# Patient Record
Sex: MALE | Race: WHITE | NOT HISPANIC OR LATINO | Employment: FULL TIME | URBAN - METROPOLITAN AREA
[De-identification: names, ages, dates, MRNs, and addresses within clinical notes are randomized per-mention and may not be internally consistent; named-entity substitution may affect disease eponyms.]

---

## 2017-01-04 ENCOUNTER — TELEPHONE (OUTPATIENT)
Dept: PALLIATIVE MEDICINE | Facility: CLINIC | Age: 42
End: 2017-01-04

## 2017-01-04 ENCOUNTER — OFFICE VISIT (OUTPATIENT)
Dept: PALLIATIVE MEDICINE | Facility: CLINIC | Age: 42
End: 2017-01-04
Payer: COMMERCIAL

## 2017-01-04 VITALS
BODY MASS INDEX: 28.74 KG/M2 | HEART RATE: 74 BPM | SYSTOLIC BLOOD PRESSURE: 122 MMHG | DIASTOLIC BLOOD PRESSURE: 76 MMHG | WEIGHT: 206 LBS

## 2017-01-04 DIAGNOSIS — G89.4 CHRONIC PAIN SYNDROME: ICD-10-CM

## 2017-01-04 DIAGNOSIS — M47.812 CERVICAL SPONDYLOSIS WITHOUT MYELOPATHY: Primary | ICD-10-CM

## 2017-01-04 DIAGNOSIS — M96.1 FAILED BACK SYNDROME OF LUMBAR SPINE: ICD-10-CM

## 2017-01-04 PROCEDURE — 99214 OFFICE O/P EST MOD 30 MIN: CPT | Performed by: PHYSICAL MEDICINE & REHABILITATION

## 2017-01-04 RX ORDER — BUPRENORPHINE 15 UG/H
15 PATCH TRANSDERMAL WEEKLY
Qty: 18 PATCH | Refills: 0 | Status: SHIPPED | OUTPATIENT
Start: 2017-01-04 | End: 2017-02-15

## 2017-01-04 ASSESSMENT — PAIN SCALES - GENERAL: PAINLEVEL: MODERATE PAIN (5)

## 2017-01-04 NOTE — NURSING NOTE
"Chief Complaint   Patient presents with     Pain       Initial /76 mmHg  Pulse 74  Wt 93.441 kg (206 lb) Estimated body mass index is 28.74 kg/(m^2) as calculated from the following:    Height as of 11/28/16: 1.803 m (5' 11\").    Weight as of this encounter: 93.441 kg (206 lb).  BP completed using cuff size: regular.  Joana Gutierrez [facility]      "

## 2017-01-04 NOTE — MR AVS SNAPSHOT
After Visit Summary   1/4/2017    Diego Meng    MRN: [id number]           Patient Information     Date Of Birth          1975        Visit Information        Provider Department      1/4/2017 8:30 AM Prudence Almonte DO [facility]        Today's Diagnoses     Cervical spondylosis without myelopathy    -  1     Chronic pain syndrome         Failed back syndrome of lumbar spine           Care Instructions    1. Clinical Health Psychologist to address issues of relaxation, behavioral change, coping style, and other factors important to improvement: Schedule an evaluation with Dr. Benitez.    2. Medication Management: UDS with expected and appropriate results and opioid agreement on 11/2/16  -Continue butrans patch 15 mcg changed every 5 days instead of every 7 days.  3. Follow up: with Dr. Almonte in 3 months.    Nurse Triage line:  [phone]   Call this number with any questions or concerns. You may leave a detailed message anytime. Calls are typically returned Monday through Friday between 8 AM and 4:30 PM. We usually get back to you within 2 business days depending on the issue/request.       Medication refills:    For non-narcotic medications, call your pharmacy directly to request a refill. The pharmacy will contact the [facility] Center for authorization. Please allow 3-4 days for these refills to be processed.     For narcotic refills, call the nurse triage line or send a PowerV[employer] message. Please contact us 7-10 days before your refill is due. The message MUST include the name of the specific medication(s) requested and how you would like to receive the prescription(s). The options are as follows:    Pain Clinic staff can mail the prescription to your pharmacy. Please tell us the name of the pharmacy.    You may pick the prescription up at the Pain Clinic (tell us the location) or during a clinic visit with your pain provider    Pain Clinic staff can deliver the  prescription to the [facility] pharmacy in the clinic building. Please tell us the location.      Scheduling number: [phone].  Call this number to schedule or change appointments.    We believe regular attendance is key to your success in our program.    Any time you are unable to keep your appointment we ask that you call us at least 24 hours in advance to let us know. This will allow us to offer the appointment time to another patient.             Follow-ups after your visit        Additional Services     PAIN INJECTION EVAL/TREAT/FOLLOW UP                 Who to contact     If you have questions or need follow up information about today's clinic visit or your schedule please contact [facility] directly at [phone].  Normal or non-critical lab and imaging results will be communicated to you by MyChart, letter or phone within 4 business days after the clinic has received the results. If you do not hear from us within 7 days, please contact the clinic through [employer] or phone. If you have a critical or abnormal lab result, we will notify you by phone as soon as possible.  Submit refill requests through Circ[employer] or call your pharmacy and they will forward the refill request to us. Please allow 3 business days for your refill to be completed.          Additional Information About Your Visit        MyChart Information     Circular gives you secure access to your electronic health record. If you see a primary care provider, you can also send messages to your care team and make appointments. If you have questions, please call your primary care clinic.  If you do not have a primary care provider, please call [phone] and they will assist you.        Care EveryWhere ID     This is your Care EveryWhere ID. This could be used by other organizations to access your [facility] medical records  [id number]        Your Vitals Were     Pulse                   74            Blood Pressure from Last 3  Encounters:   01/04/17 122/76   12/05/16 118/76   11/28/16 126/81    Weight from Last 3 Encounters:   01/04/17 93.441 kg (206 lb)   12/05/16 93.441 kg (206 lb)   11/28/16 93.305 kg (205 lb 11.2 oz)              We Performed the Following     PAIN INJECTION EVAL/TREAT/FOLLOW UP          Today's Medication Changes          These changes are accurate as of: 1/4/17  9:09 AM.  If you have any questions, ask your nurse or doctor.               These medicines have changed or have updated prescriptions.        Dose/Directions    * Buprenorphine 15 MCG/HR Ptwk   This may have changed:  Another medication with the same name was added. Make sure you understand how and when to take each.   Used for:  Chronic pain syndrome   Changed by:  Prudence Almonte DO        Dose:  15 mcg   Place 15 mcg onto the skin every 7 days OK to dispense 1/2/17 to start 1/3/17   Quantity:  4 patch   Refills:  0       * Buprenorphine 15 MCG/HR Ptwk   Commonly known as:  BUTRANS   This may have changed:  You were already taking a medication with the same name, and this prescription was added. Make sure you understand how and when to take each.   Used for:  Cervical spondylosis without myelopathy, Chronic pain syndrome, Failed back syndrome of lumbar spine   Changed by:  Prudence Almonte DO        Dose:  15 mcg/hr   Place 15 mcg/hr onto the skin once a week Change patch every 5 days. 90 day supply.   Quantity:  18 patch   Refills:  0       * Notice:  This list has 2 medication(s) that are the same as other medications prescribed for you. Read the directions carefully, and ask your doctor or other care provider to review them with you.         Where to get your medicines      Some of these will need a paper prescription and others can be bought over the counter.  Ask your nurse if you have questions.     Bring a paper prescription for each of these medications    - Buprenorphine 15 MCG/HR Ptwk             Primary Care Provider Office Phone # Fax #     Priscilla Murphy [phone]       [address] [address]        Thank you!     Thank you for choosing [facility]  for your care. Our goal is always to provide you with excellent care. Hearing back from our patients is one way we can continue to improve our services. Please take a few minutes to complete the written survey that you may receive in the mail after your visit with us. Thank you!             Your Updated Medication List - Protect others around you: Learn how to safely use, store and throw away your medicines at www.disposemymeds.org.          This list is accurate as of: 1/4/17  9:09 AM.  Always use your most recent med list.                   Brand Name Dispense Instructions for use    atorvastatin 40 MG tablet    LIPITOR    90 tablet    Take 1 tablet (40 mg) by mouth daily       * Buprenorphine 15 MCG/HR Ptwk     4 patch    Place 15 mcg onto the skin every 7 days OK to dispense 1/2/17 to start 1/3/17       * Buprenorphine 15 MCG/HR Ptwk    BUTRANS    18 patch    Place 15 mcg/hr onto the skin once a week Change patch every 5 days. 90 day supply.       buprenorphine HCl-naloxone HCl 8-2 MG per film    SUBOXONE    90 Film    Place 1 Film under the tongue 3 times daily       clonazePAM 1 MG tablet    klonoPIN    120 tablet    Take 1 tablet (1 mg) by mouth 4 times daily       ESCITALOPRAM OXALATE PO      Take 20 mg by mouth daily       esomeprazole 20 MG CR capsule    nexIUM    30 capsule    Take 1 capsule (20 mg) by mouth every morning (before breakfast) Take 30-60 minutes before eating.       methocarbamol 500 MG tablet    ROBAXIN    240 tablet    Take 1 tablet (500 mg) by mouth 2 times daily       omega 3 1000 MG Caps     90 capsule    Take 1 g by mouth daily       * Notice:  This list has 2 medication(s) that are the same as other medications prescribed for you. Read the directions carefully, and ask your doctor or other care  provider to review them with you.

## 2017-01-04 NOTE — PROGRESS NOTES
[facility]    Date of visit: 1/4/2016    Chief complaint:   Chief Complaint   Patient presents with     Pain       Interval history:  Diego Meng is a 41 year old male last seen by me on 12/5/16.    Since his last visit, Diego Meng reports:  -His left knee has significantly improved. He continues to have right knee pain, but no plans for surgery until his left knee is completely healed. He denies any specific injury to this knee.  -Butrans is working well. He feels that the butrans patch is providing some relief of his back pain. He notices on day 5 that the pain relief starts wearing off. He reports that it is expensive. Talked to the pharmacy about coupons.   -His headaches are unchanged despite the increase in Butrans. They start at the back of his head/neck and radiate through his head to his eyes. We reviewed his cervical MRI and he is interested in proceeding with medial branch blocks. He has had medial branch blocks for his low back pain in the past, which did not provide significant relief.     Pain scores:  Pain intensity on average is 5 (down from 6) on a scale of 0-10.     Current pain treatments:   butrans 15 mcg patch  excedrin #2-6/day for 20 years  lexapro 20 mg daily  Clonazepam 1 mg #1-2/day    Side Effects: no side effect    THE 4 A's OF OPIOID MAINTENANCE ANALGESIA    Analgesia: good, decreases pain by 2 points    Activity: improving     Adverse effects: none    Adherence to Rx protocol: good      Past pain treatments:  Diego Meng has been seen at a pain clinic in the past. [facility] and [facility]  Medications:                botox -no relief              Anti-migraine: fioricet, migranal, eletriptan, frovatriptan, midrin, naraptriptan, sumatriptan, zolmitripatn -no relief from any of these              Anti-convulsants: depakote, topiramate, gabapentin, lyrica -all of these made him feel fuzzy and  forgetful and they were not helpful              Opioids: oxycontin, morphine -no relief, hydrocodone -no relief, tramadol -no relief                Muscle relaxants: soma -no relief, flexeril -no relief and made him tired, norflex -no relief, tizanidine -no relief and made him tired              NSAIDs: allergic to this class of medications -rash, abdominal pain and cramping              Anti-depressants: cymbalta -no relief                Topicals: lidocaine -no relief  PT: [facility] in 2016 -helped for shoulder but flared up back and neck  Psychology: yes for pain at the [facility] -'continues to use these skills'  Acupuncture: tried it -no relief  Chiropractic care: tried it -helped initially but then flared up pain  TENS Unit: no relief  Injections: lumbar epidural steroid injection -flared pain, cervical epidural steroid injection -provided 2-4 weeks of relief but had significant flare up for 2 weeks post-procedure, cervical trigger point injections -minimal relief, lumbar medial branch block -no relief  Surgery: right L4-L5 hemilaminectomy May 2015 Dr. Rios    Medications:  Current Outpatient Prescriptions   Medication Sig Dispense Refill     Buprenorphine 15 MCG/HR PTWK Place 15 mcg onto the skin every 7 days OK to dispense 1/2/17 to start 1/3/17 4 patch 0     clonazePAM (KLONOPIN) 1 MG tablet Take 1 tablet (1 mg) by mouth 4 times daily 120 tablet 0     atorvastatin (LIPITOR) 40 MG tablet Take 1 tablet (40 mg) by mouth daily 90 tablet 1     esomeprazole (NEXIUM) 20 MG capsule Take 1 capsule (20 mg) by mouth every morning (before breakfast) Take 30-60 minutes before eating. 30 capsule 0     omega 3 1000 MG CAPS Take 1 g by mouth daily 90 capsule      ESCITALOPRAM OXALATE PO Take 20 mg by mouth daily       buprenorphine HCl-naloxone HCl (SUBOXONE) 8-2 MG film Place 1 Film under the tongue 3 times daily 90 Film 0     methocarbamol (ROBAXIN) 500 MG tablet Take 1 tablet  (500 mg) by mouth 2 times daily 240 tablet        Medical History: any changes in medical history since they were last seen? no    Review of Systems:  The 14 system ROS was reviewed from the intake questionnaire, and is positive for: headache  Any bowel or bladder problems: none  Mood: anxiety    Physical Exam:  Blood pressure 122/76, pulse 74, weight 93.441 kg (206 lb).  General: no acute distress, pleasant, sitting comfortably  Gait: normal  MSK exam: baseline    Imaging:  Lumbar MRI completed on 8/1/15 showed:  CONCLUSION:    1. Interval right hemilaminotomy and partial facetectomy at L4-L5. No recurrent disc herniation.  2. Other minor degenerative changes. Annular fissures L4-L5 and L5-S1. No high-grade central canal or foraminal stenosis at any level.    Cervical spine MRI completed on 11/14/14:  FINDINGS: Cervical spine visualized through the upper thoracic spine. Lordosis normal. Slight retrolisthesis at each level between C3 and C6. Vertebral body heights are normal. Marrow signal is normal, including the facets. Paraspinous soft tissues are unremarkable. Visible posterior fossa contents are unremarkable. Cord signal and morphology is normal.  CRANIOCERVICAL JUNCTION: Mild anterior spurring at C1-C2. Foramen magnum and central canal are adequate.  C2-C3: Disc height normal. Mild facet hypertrophy. No central canal or foraminal stenosis.  C3-C4: Disc height normal. Slight right uncinate hypertrophy. Minimal facet hypertrophy. No significant central canal or foraminal stenosis.    C4-C5: Slight disc height loss. Mild bilateral facet hypertrophy. No central canal or foraminal stenosis.  C5-C6: Mild disc height loss with left eccentric disc osteophyte complex. Mild bilateral facet hypertrophy. No central canal or right foraminal stenosis. Moderate left foraminal stenosis.    C6-C7: Slight disc height loss. Mild facet hypertrophy. No central canal or foraminal stenosis.  C7-T1: Mild disc height loss. Mild to  moderate bilateral facet hypertrophy. No central canal or foraminal stenosis.  CONCLUSION:  1. Moderate left foraminal stenosis at C5-C6.  2. Other relatively minor degenerative changes, no further high-grade central canal or foraminal stenosis.     Right shoulder x-ray completed on 4/2/15:  FINDINGS: No evidence of fracture or dislocation. Normal glenohumeral joint alignment without evidence of degenerative changes. No evidence of significant acromioclavicular joint degenerative joint disease. No acromial or coracoid enthesophyte. Mild undersurface acromial sclerosis. Downslope type three acromion. No blastic or lytic lesions.  IMPRESSION:  Right shoulder type three acromion otherwise within normal limits.     [facility] Reviewed: YES; as expected, no concern for abuse or misuse    Assessment:   1. Chronic pain syndrome  2. Opioid dependency  3. Chronic low back pain. Right L4-L5 hemilaminectomy partial facetectomy in May 2015 with Dr. Rios  4. Chronic neck pain with associated chronic headaches. Cervical facet arthropathy.  5. Anxiety with panic attacks. Takes lexapro and clonazepam.  6. Tobacco use disorder  7. Left knee meniscus repair on 11/28/16 with Dr. Kaufman. Chronic right knee pain as well.     Plan:  1. Clinical Health Psychologist to address issues of relaxation, behavioral change, coping style, and other factors important to improvement: Schedule an evaluation with Dr. Benitze.    2. Medication Management: UDS with expected and appropriate results and opioid agreement on 11/2/16  -Continue butrans patch 15 mcg changed every 5 days instead of every 7 days. 90 day supply.   3. Follow up: with Dr. Almonte in 3 months.  4. Cervical medial branch blocks     Total time spent was 30 minutes, and more than 50% of face to face time was spent in counseling and/or coordination of care regarding the above assessment and plan.    Prudence Almonte, [facility]  [facility]

## 2017-01-04 NOTE — TELEPHONE ENCOUNTER
Pre-screening questions for Radiology Injections:    Injection to be done at which interventional clinic site? [facility]    Procedure ordered by Dr. CORONA    Procedure ordered? Cervical Medial Branch Block    What insurance would patient like us to bill for this procedure? BCBS      Worker's comp- Any injection DO NOT SCHEDULE and route to Mitzi Randolph.      [employer] insurance - If scheduling an SI joint injection DO NOT SCHEDULE and route to Mitiz Randolph.     [employer]- MBB's must be scheduled at LEAST two weeks apart      [employer] - Any injection besides hip/shoulder/knee joint DO NOT SCHEDULE and route to Mitzi Randolph. She will obtain PA and call pt back to schedule procedure or notify pt of denial.     Is an  needed? No     Patient has a drive home? (mandatory) Yes     Is patient taking any blood thinners (plavix, coumadin, jantoven, warfarin, heparin, pradaxa or dabigatran )? No   (If so, do not schedule, contact RN and/or MD)     Is patient taking any aspirin products? No   (If more than 325mg/day do not schedule; Contact RN/MD. For all non-cervical interventional procedures if patient is taking MORE than 325mg/day, limit aspirin to 81-325mg/day x 1 week. No hold required day of procedure.  For CERVICAL procedures, hold all aspirin products for 6 days.)      Does the patient have a bleeding or clotting disorder? No   (If yes, okay to schedule, but contact RN/MD).  **For any patients with platelet count <100, must be forwarded to provider**    Is patient diabetic? no If YES, have them bring their glucometer.    Does patient have an active infection or treated for one within the past week? No    Is patient currently taking any antibiotics?  No  For patients on chronic, preventative, or prophylactic antibiotics, procedures can be scheduled.   For patients on antibiotics for active or recent infection:  Suzy Jaimes, Nancy-antibiotic course must have been completed for 4  days  Saad Cramer-antibiotic course must have been completed for 7 days    Is patient currently taking any steroid medications? (i.e. Prednisone, Medrol)  No   For patients on steroid medications:  Suzy Jaimes [place]-steroid course must have been completed for 4 days  Saad Cramer-steroid course must have been completed for 7 days    Review with patient:  If you are started on any steroids or antibiotics between now and your appointment, you must contact us because it may affect our ability to perform your procedure informed    Is patient actively being treated for cancer or immunocompromised, including the spleen having been removed? No  **For Dr. Clancy patients without spleens should have the chart sent to her**  (If YES, do NOT schedule and route to RN)    Are you able to get on and off an exam table with minimal or no assistance? Yes  (If NO, do NOT schedule and route to RN)  Are you able to roll over and lay on your stomach with minimal or no assistance? Yes  (If NO, do NOT schedule and route to RN)         Any allergies to contrast dye, iodine, shellfish, or numbing and steroid medications? No  (If so, inform nursing and note in scheduling comments.)    Allergies: Nsaids      Any chance of pregnancy? No    Has the patient had a flu shot or any other vaccinations within 7 days before or after the procedure.    No       Does patient have an MRI/CT? YES  (SI joint, hip injections, lumbar sympathetic blocks, and stellate ganglion blocks do not require an MRI)    If so, was it done at [facility]? Yes      If not, where was it done?      Was the MRI done w/in the last 3 years? Yes     If MRI was not done at [facility], [facility] or [facility] do NOT schedule. Route to nursing.  (If pt has disc the injection can be scheduled but pt has to bring disc to appt. If they show up w/out disc the injection cannot be done)      **Must be scheduled with elapsed time interval of at least 2 weeks and not  more than 6 months between the First MBB and the Second MBB**       Medial Branch Block Pre-Procedure Instructions    It is okay to take long acting pain medications (if you are on them) the day of the procedure but try not to take any short acting medications unless absolutely necessary. informed        Long acting meds would include: Gabapentin (Neurontin), MS Contin, Oxycontin        Short acting meds would include:  Percocet, Oxycodone, Vicodin, Ibuprofen     The day of the procedure, you should try to do things that provoke your pain, since the injection is being done to see if it will relieve your pain . INFORMED    If your pain level is a 4 out of 10 or less on the day of the procedure, please call [phone] to reschedule.  INFORMED      The latest appt available for cervical MBB is 2:30pm. Do Not schedule any later.        Reminders (please tell patient if applicable):        Instructed pt to arrive 30 minutes early for IV start if this is for a cervical procedure, ALL sympathetic (stellate ganglion, hypogastric, or lumbar sympathetic block) and all sedation procedures (RFA, spinal cord stimulation trials).         -IVs are not routinely placed for Almonte and [place] cervical cases       If NPO for sedation, it is okay to take medications with sips of water (except if they are to hold blood thinners).    *DO take blood pressure medication if it is prescribed*      If this is for a cervical MBB aspirin needs to be held for 6 days.        Do not schedule procedures requiring IV placement in the first appointment after lunch         For patients 85 or older we recommend having an adult stay w/ them for the remainder of the day.              Does the patient have any questions?

## 2017-01-04 NOTE — PATIENT INSTRUCTIONS
1. Clinical Health Psychologist to address issues of relaxation, behavioral change, coping style, and other factors important to improvement: Schedule an evaluation with Dr. Benitez.    2. Medication Management: UDS with expected and appropriate results and opioid agreement on 11/2/16  -Continue butrans patch 15 mcg changed every 5 days instead of every 7 days.  3. Follow up: with Dr. Almonte in 3 months.    Nurse Triage line:  [phone]   Call this number with any questions or concerns. You may leave a detailed message anytime. Calls are typically returned Monday through Friday between 8 AM and 4:30 PM. We usually get back to you within 2 business days depending on the issue/request.       Medication refills:    For non-narcotic medications, call your pharmacy directly to request a refill. The pharmacy will contact the [facility] Center for authorization. Please allow 3-4 days for these refills to be processed.     For narcotic refills, call the nurse triage line or send a [employer] message. Please contact us 7-10 days before your refill is due. The message MUST include the name of the specific medication(s) requested and how you would like to receive the prescription(s). The options are as follows:    Pain Clinic staff can mail the prescription to your pharmacy. Please tell us the name of the pharmacy.    You may pick the prescription up at the Pain Clinic (tell us the location) or during a clinic visit with your pain provider    Pain Clinic staff can deliver the prescription to the [place] pharmacy in the clinic building. Please tell us the location.      Scheduling number: [phone].  Call this number to schedule or change appointments.    We believe regular attendance is key to your success in our program.    Any time you are unable to keep your appointment we ask that you call us at least 24 hours in advance to let us know. This will allow us to offer the appointment time to another patient.

## 2017-01-18 ENCOUNTER — TELEPHONE (OUTPATIENT)
Dept: PALLIATIVE MEDICINE | Facility: CLINIC | Age: 42
End: 2017-01-18

## 2017-01-18 DIAGNOSIS — G89.4 CHRONIC PAIN SYNDROME: Primary | ICD-10-CM

## 2017-01-18 NOTE — TELEPHONE ENCOUNTER
I submitted a prior authorization for patient via Cover My Med's. I am waiting to hear back as to wether it has been approved or denied.      Allegra Wyatt [facility] [place]

## 2017-01-19 NOTE — TELEPHONE ENCOUNTER
Patient is calling to check on status of authorization, informed him of information below. Patient would like an appeal submitted if possible.    Anne Marie Mims    [facility]

## 2017-01-19 NOTE — TELEPHONE ENCOUNTER
I received a fax from patients insurance stating that they will only cover 4 patches a month. They will not approve anymore then 4 for 30 days. I will forward this to the provider as a FYI.      Allegra Wyatt [facility] [place]

## 2017-01-20 ENCOUNTER — TELEPHONE (OUTPATIENT)
Dept: PALLIATIVE MEDICINE | Facility: CLINIC | Age: 42
End: 2017-01-20

## 2017-01-20 NOTE — TELEPHONE ENCOUNTER
Patient states he will not be able to make appointment. Patient will call back to reschedule. Cancelling his appointment.  Joana Gutierrez, [facility]

## 2017-01-23 RX ORDER — BUPRENORPHINE 15 UG/H
15 PATCH TRANSDERMAL
Qty: 4 PATCH | Refills: 0 | Status: SHIPPED | OUTPATIENT
Start: 2017-01-23 | End: 2017-01-24 | Stop reason: DRUGHIGH

## 2017-01-23 RX ORDER — BUPRENORPHINE 15 UG/H
15 PATCH TRANSDERMAL
Qty: 4 PATCH | Refills: 0 | Status: SHIPPED | OUTPATIENT
Start: 2017-01-23 | End: 2017-01-23

## 2017-01-23 NOTE — TELEPHONE ENCOUNTER
Routing to MA pool for appeal    Trena Washburn  BSN-RN Care Coordinator  [facility]

## 2017-01-23 NOTE — TELEPHONE ENCOUNTER
Routing to provider to review. Insurance will only cover 4/month. Patient on last day of meds. Routing for recommendation. MA will follow up on PA as well    Trena MARCN-RN Care Coordinator  [facility]

## 2017-01-23 NOTE — TELEPHONE ENCOUNTER
Rx faxed to [facility]. Fax confirmation received. Hard copy destroyed.   Joana Gutierrez, CARLOS   [facility] [place]

## 2017-01-23 NOTE — TELEPHONE ENCOUNTER
Patient states today is the last day of his medication, wants to know what to do. Call back number: [phone].    Anne Marie Mims    [facility]

## 2017-01-23 NOTE — TELEPHONE ENCOUNTER
Called patient to inform of new script for 4 patches to be faxed while we attempt appeal for Butrans increase. Advised that we will be in contact with him when we have an answer on the appeal.     Routing to MA pool.  Please fax script to [facility] on [address]. Do not close encounter. Please follow up with PA/justice MARCN-RN Care Coordinator  [facility]

## 2017-01-23 NOTE — TELEPHONE ENCOUNTER
I refilled the Butrans and placed the script in the out basket.     Prudence Almonte,   [facility] [place]

## 2017-01-24 NOTE — TELEPHONE ENCOUNTER
Patient was contacted by Joana GAFFNEY to advise of PA approval, he did not  new script for 4 patches that were faxed yesterday.  PA approved for 6 patches. Pharmacy contacted to verify script for 6/month still on file, they were able to run script through with no issue. They stated they would call patient when script ready for . Advised to d/c script for #4 done on 01/23/17.     Trena Washburn  BSN-RN Care Coordinator  [place]

## 2017-01-24 NOTE — TELEPHONE ENCOUNTER
Received PA approval for Butrans (UP TO 6 PATCHES PER MONTH) from [employer] insurance.     Authorization Number: [id number]  Effective dates:  1/18/2017 to 7/18/2017    Faxed approval to [facility].     Joana Gutierrez, [facility]

## 2017-01-24 NOTE — TELEPHONE ENCOUNTER
I called patients insurance to find out what I needed for the appeal. I then faxed the information that they requested. I am waiting for a reply as to the status of the appeal.      Allegra Wyatt [facility] [place]

## 2017-01-24 NOTE — TELEPHONE ENCOUNTER
I called and talked to the patient and let him know that the prior auth was approved. I also asked him if he had filled the prescription for the 4 patches he stated that he had not so I let the nurse know. See documentation below. I also let the patient know that the pharmacy would be getting more patches in today and that they would call him when they were ready.      Allegra Wyatt [facility] [place]

## 2017-02-07 DIAGNOSIS — F41.1 GAD (GENERALIZED ANXIETY DISORDER): ICD-10-CM

## 2017-02-07 DIAGNOSIS — F41.0 PANIC ATTACK: Primary | ICD-10-CM

## 2017-02-07 NOTE — TELEPHONE ENCOUNTER
clonazePAM (KLONOPIN) 1 MG tablet  Last Written Prescription Date:  12-  Last Fill Quantity: 120,   # refills: 0  Last Office Visit with [facility], [facility] or [facility] prescribing provider: 01-  Future Office visit:   n/a    Routing refill request to provider for review/approval because:  Drug not on the [facility], [facility] or [facility] refill protocol or controlled substance    Please call patient once rx is done and he will come and pick it up at the desk

## 2017-02-08 RX ORDER — CLONAZEPAM 1 MG/1
1 TABLET ORAL 4 TIMES DAILY
Qty: 120 TABLET | Refills: 0 | Status: SHIPPED | OUTPATIENT
Start: 2017-02-08 | End: 2017-03-14

## 2017-02-08 NOTE — TELEPHONE ENCOUNTER
Printed, signed, in my outbox.    Priscilla Murphy MD  Internal Medicine/Pediatrics  [facility]

## 2017-02-15 DIAGNOSIS — G89.4 CHRONIC PAIN SYNDROME: ICD-10-CM

## 2017-02-15 DIAGNOSIS — M47.812 CERVICAL SPONDYLOSIS WITHOUT MYELOPATHY: ICD-10-CM

## 2017-02-15 DIAGNOSIS — M96.1 FAILED BACK SYNDROME OF LUMBAR SPINE: ICD-10-CM

## 2017-02-15 NOTE — TELEPHONE ENCOUNTER
Received call from patient requesting refill(s) of Butrans 15mcg. Was only able to  6 of them at last fill, one month supply per prior auth.     Last picked up from pharmacy on 1/24/17  Due date 2/23/17    Pt last seen on 1/4/17  Next appt scheduled NONE    Last urine drug screen 11/2/16  Current opioid agreement on file Yes Date: 11/2/16    Processing (pick one):  Fax to [facility]    Will facilitate refill.

## 2017-02-15 NOTE — TELEPHONE ENCOUNTER
Patient called at 12:56. He would like a refill of his Butrans patch 15 mcg.  Tuesday next week is when he will be out.  We can call him at [phone]    Ratna BolanosR)

## 2017-02-17 RX ORDER — BUPRENORPHINE 15 UG/H
15 PATCH TRANSDERMAL WEEKLY
Qty: 6 PATCH | Refills: 0 | Status: SHIPPED | OUTPATIENT
Start: 2017-02-23 | End: 2017-03-15

## 2017-02-17 NOTE — TELEPHONE ENCOUNTER
Rx faxed to [facility]. Fax confirmation received. Hardcopy destroyed. Left patient message.   Joana Gutierrez [facility]

## 2017-02-20 ENCOUNTER — TELEPHONE (OUTPATIENT)
Dept: PALLIATIVE MEDICINE | Facility: CLINIC | Age: 42
End: 2017-02-20

## 2017-02-20 NOTE — TELEPHONE ENCOUNTER
"Call came in on 2/20/17 at 1 :59 pm.    \"Had switched insurances on 2/1/17 and need to have a PA done on the Butrans. My Group #  [id number] . My ID # [id number].[employer] needs a PA.  \" Will route to MA pool to initiate.  Viri lai rn    "

## 2017-02-21 ENCOUNTER — TELEPHONE (OUTPATIENT)
Dept: PALLIATIVE MEDICINE | Facility: CLINIC | Age: 42
End: 2017-02-21

## 2017-02-21 NOTE — TELEPHONE ENCOUNTER
Nurse Line Message 2/21/2017 Routing to RN pool    Patients Butrans is being held up by a PA. He will be out of medication tomorrow so needs a plan. He is requesting a call back at [phone].    TARI MelvinN, RN  Care Coordinator  [facility] [place]

## 2017-02-21 NOTE — TELEPHONE ENCOUNTER
I submitted a prior auth via Cover My Meds for patient. I am waiting to hear back on wether they are going to approve it.      Allegra Wyatt [facility] [place]

## 2017-02-22 NOTE — TELEPHONE ENCOUNTER
I called [employer] to find out the status of the prior auth. I was informed that it had been approved on 02/20/2017. I called the pharmacy and also called that patient to let him know that he could  his prescription.    Allegra Wyatt [facility] [place]

## 2017-02-22 NOTE — TELEPHONE ENCOUNTER
There is already an open encounter regarding this medication/PA problem. Copied message below into that to avoid multiple encounters pertaining to the same thing.    Joana Christianson, MSN, RN-BC  Care Coordinator  [facility] [place]

## 2017-02-22 NOTE — TELEPHONE ENCOUNTER
Spoke with Diego and let him know we have resources working on his PA issue for Ac. Dr. Reba Vallecillo is also aware and will help work on a plan if PA cannot be obtained in a timely manner. He is due to change his Butrans patch tomorrow, Thursday, 2/23/17.    Joana Christianson, MSN, RN-BC  Care Coordinator  [facility] [place]

## 2017-02-22 NOTE — TELEPHONE ENCOUNTER
Below message received on separate 2/21/17 encounter. Copying here to avoid multiple encounters pertaining to the same thing.    Nurse Line Message 2/21/2017 Routing to RN pool     Patients Ac is being held up by a PA. He will be out of medication tomorrow so needs a plan. He is requesting a call back at [phone].     COLLEEN Melvin, RN  Care Coordinator  [facility] [place]

## 2017-03-14 DIAGNOSIS — F41.0 PANIC ATTACK: ICD-10-CM

## 2017-03-14 DIAGNOSIS — M96.1 FAILED BACK SYNDROME OF LUMBAR SPINE: ICD-10-CM

## 2017-03-14 DIAGNOSIS — F41.1 GAD (GENERALIZED ANXIETY DISORDER): ICD-10-CM

## 2017-03-14 DIAGNOSIS — M47.812 CERVICAL SPONDYLOSIS WITHOUT MYELOPATHY: ICD-10-CM

## 2017-03-14 DIAGNOSIS — G89.4 CHRONIC PAIN SYNDROME: ICD-10-CM

## 2017-03-14 NOTE — TELEPHONE ENCOUNTER
Call came in at 10:54 am today.    Pt calling to request a refill on the Butrans 15g. Will route to MA pool to initiate the process.  Viri lai rn

## 2017-03-14 NOTE — TELEPHONE ENCOUNTER
clonazePAM (KLONOPIN) 1 MG tablet      Last Written Prescription Date:  02/8/2017  Last Fill Quantity: 120,   # refills: 0  Last Office Visit with [facility], [facility] or [facility] prescribing provider: 1/4/2017  Future Office visit:       Routing refill request to provider for review/approval because:  Drug not on the [facility], [facility] or [facility] refill protocol or controlled substance

## 2017-03-15 RX ORDER — CLONAZEPAM 1 MG/1
1 TABLET ORAL 4 TIMES DAILY
Qty: 120 TABLET | Refills: 0 | Status: SHIPPED | OUTPATIENT
Start: 2017-03-15 | End: 2017-04-18

## 2017-03-15 RX ORDER — BUPRENORPHINE 15 UG/H
15 PATCH TRANSDERMAL WEEKLY
Qty: 6 PATCH | Refills: 0 | Status: SHIPPED | OUTPATIENT
Start: 2017-03-25 | End: 2017-04-17 | Stop reason: ALTCHOICE

## 2017-03-15 NOTE — TELEPHONE ENCOUNTER
Medication refill information reviewed.     Due date for Butrans patch is 3/23/17 based on last start date of 2/21/17. 30 days supply was given - patient changes patch every 5 days.      Prescriptions prepped for review.     Will route to provider.

## 2017-03-15 NOTE — TELEPHONE ENCOUNTER
Received call from patient requesting refill(s) of Butrans     Last picked up from pharmacy on 02/22/2017    Pt last seen by prescribing provider on 01/04/2017  Next appt scheduled for none    Last urine drug screen date 11/02/2016  Current opioid agreement on file (completed within the last year) Yes Date of opioid agreement: 11/02/2016    Processing (pick one and delete the others):        Will route to nursing pool for review and preparation of prescription(s).       Allegra Wyatt [facility] [place]

## 2017-03-15 NOTE — TELEPHONE ENCOUNTER
Rx faxed to [facility]. Fax confirmation received. Hardcopy destroyed.   Joana Gutierrez, CARLOS   [facility]

## 2017-03-15 NOTE — TELEPHONE ENCOUNTER
Printed, signed, in my outbox.    Priscilla Murphy MD  Internal Medicine/Pediatrics  [facility]

## 2017-03-16 ENCOUNTER — TELEPHONE (OUTPATIENT)
Dept: PALLIATIVE MEDICINE | Facility: CLINIC | Age: 42
End: 2017-03-16

## 2017-03-16 NOTE — TELEPHONE ENCOUNTER
Called [facility]-confirmed  day of Butrans 02/22/17, #6.    Called patient, He states that he believed he should be due on the 19th because he picked up script on the 17th. Advised that I confirmed with [facility] he did not  until 02/22/17 and received a 30 day supply. Advised that he must have another patch somewhere that he is missing. Advised that he look for it. Advised that he is base on  and start dates not due until 25th.   Will leave call open for follow up     Trena MARCN-RN Care Coordinator  [facility]

## 2017-03-16 NOTE — TELEPHONE ENCOUNTER
Nurse Line Message 3/16/17 at 12:08 pm    Patient called saying he cannot pick his script for Butrans up until 3/23 to start on 3/25 however he will need to start the script on 3/19/17. He believes we are off on our math and asks that we double check fill and start dates and call him back at [phone].    TRAI MelvinN, RN  Care Coordinator  [facility] [place]

## 2017-03-22 NOTE — TELEPHONE ENCOUNTER
6 days and no further contact from ade mcwilliams.    Joana Christianson, MSN, RN-BC  Care Coordinator  [facility] [place]

## 2017-04-05 ENCOUNTER — OFFICE VISIT (OUTPATIENT)
Dept: PALLIATIVE MEDICINE | Facility: CLINIC | Age: 42
End: 2017-04-05
Payer: COMMERCIAL

## 2017-04-05 VITALS — SYSTOLIC BLOOD PRESSURE: 118 MMHG | HEART RATE: 79 BPM | OXYGEN SATURATION: 97 % | DIASTOLIC BLOOD PRESSURE: 72 MMHG

## 2017-04-05 DIAGNOSIS — M54.41 CHRONIC RIGHT-SIDED LOW BACK PAIN WITH RIGHT-SIDED SCIATICA: Primary | ICD-10-CM

## 2017-04-05 DIAGNOSIS — M96.1 FAILED BACK SYNDROME OF LUMBAR SPINE: ICD-10-CM

## 2017-04-05 DIAGNOSIS — G89.29 CHRONIC RIGHT-SIDED LOW BACK PAIN WITH RIGHT-SIDED SCIATICA: Primary | ICD-10-CM

## 2017-04-05 PROCEDURE — 99214 OFFICE O/P EST MOD 30 MIN: CPT | Performed by: PHYSICAL MEDICINE & REHABILITATION

## 2017-04-05 RX ORDER — OXYCODONE HCL 10 MG/1
10 TABLET, FILM COATED, EXTENDED RELEASE ORAL EVERY 12 HOURS
Qty: 60 TABLET | Refills: 0 | Status: SHIPPED | OUTPATIENT
Start: 2017-04-05 | End: 2017-04-17 | Stop reason: ALTCHOICE

## 2017-04-05 ASSESSMENT — PAIN SCALES - GENERAL: PAINLEVEL: EXTREME PAIN (8)

## 2017-04-05 NOTE — PATIENT INSTRUCTIONS
1. Clinical Health Psychologist to address issues of relaxation, behavioral change, coping style, and other factors important to improvement: Schedule an evaluation with Dr. Benitez.    2. Medication Management: UDS with expected and appropriate results and opioid agreement on 11/2/16  -Discontinue Butrans 15 mcg.  -Start Oxycontin 10 mg every 12 hours. Avoid dose escalations.   3. Follow up: with Dr. Almonte in 1 month  4. Release of information: [facility]  5. Diagnostic studies: lumbar spine -I will call with the results.   6. Follow up with Dr. Kaufman regarding knee pain.     ----------------------------------------------------------------  Nurse Triage line:  [phone]   Call this number with any questions or concerns. You may leave a detailed message anytime. Calls are typically returned Monday through Friday between 8 AM and 4:30 PM. We usually get back to you within 2 business days depending on the issue/request.       Medication refills:    For non-narcotic medications, call your pharmacy directly to request a refill. The pharmacy will contact the Pain Management Center for authorization. Please allow 3-4 days for these refills to be processed.     For narcotic refills, call the nurse triage line or send a [employer] message. Please contact us 7-10 days before your refill is due. The message MUST include the name of the specific medication(s) requested and how you would like to receive the prescription(s). The options are as follows:    Pain Clinic staff can mail the prescription to your pharmacy. Please tell us the name of the pharmacy.    You may pick the prescription up at the Pain Clinic (tell us the location) or during a clinic visit with your pain provider    Pain Clinic staff can deliver the prescription to the [place] pharmacy in the clinic building. Please tell us the location.      Scheduling number: [phone].  Call this number to schedule or change appointments.    We believe regular  attendance is key to your success in our program.    Any time you are unable to keep your appointment we ask that you call us at least 24 hours in advance to let us know. This will allow us to offer the appointment time to another patient.

## 2017-04-05 NOTE — MR AVS SNAPSHOT
After Visit Summary   4/5/2017    Diego Meng    MRN: [id number]           Patient Information     Date Of Birth          1975        Visit Information        Provider Department      4/5/2017 4:00 PM Prudence Almonte DO [facility]        Today's Diagnoses     Chronic right-sided low back pain with right-sided sciatica    -  1    Failed back syndrome of lumbar spine          Care Instructions      1. Clinical Health Psychologist to address issues of relaxation, behavioral change, coping style, and other factors important to improvement: Schedule an evaluation with Dr. Benitez.    2. Medication Management: UDS with expected and appropriate results and opioid agreement on 11/2/16  -Discontinue Butrans 15 mcg.  -Start Oxycontin 10 mg every 12 hours. Avoid dose escalations.   3. Follow up: with Dr. Almonte in 1 month  4. Release of information: [facility]  5. Diagnostic studies: lumbar spine -I will call with the results.   6. Follow up with Dr. Kaufman regarding knee pain.     ----------------------------------------------------------------  Nurse Triage line:  [phone]   Call this number with any questions or concerns. You may leave a detailed message anytime. Calls are typically returned Monday through Friday between 8 AM and 4:30 PM. We usually get back to you within 2 business days depending on the issue/request.       Medication refills:    For non-narcotic medications, call your pharmacy directly to request a refill. The pharmacy will contact the [facility] Management Center for authorization. Please allow 3-4 days for these refills to be processed.     For narcotic refills, call the nurse triage line or send a [employer] message. Please contact us 7-10 days before your refill is due. The message MUST include the name of the specific medication(s) requested and how you would like to receive the prescription(s). The options are as follows:    Pain Clinic staff can mail  the prescription to your pharmacy. Please tell us the name of the pharmacy.    You may pick the prescription up at the Pain Clinic (tell us the location) or during a clinic visit with your pain provider    Pain Clinic staff can deliver the prescription to the [place] pharmacy in the clinic building. Please tell us the location.      Scheduling number: [phone].  Call this number to schedule or change appointments.    We believe regular attendance is key to your success in our program.    Any time you are unable to keep your appointment we ask that you call us at least 24 hours in advance to let us know. This will allow us to offer the appointment time to another patient.             Follow-ups after your visit        Future tests that were ordered for you today     Open Future Orders        Priority Expected Expires Ordered    MR Lumbar Spine w/o Contrast Routine  4/5/2018 4/5/2017            Who to contact     If you have questions or need follow up information about today's clinic visit or your schedule please contact [facility] directly at [phone].  Normal or non-critical lab and imaging results will be communicated to you by [employer], letter or phone within 4 business days after the clinic has received the results. If you do not hear from us within 7 days, please contact the clinic through [employer] or phone. If you have a critical or abnormal lab result, we will notify you by phone as soon as possible.  Submit refill requests through [employer] or call your pharmacy and they will forward the refill request to us. Please allow 3 business days for your refill to be completed.          Additional Information About Your Visit        [employer] Backup Information     [employer] gives you secure access to your electronic health record. If you see a primary care provider, you can also send messages to your care team and make appointments. If you have questions, please call your primary care clinic.  If you do  not have a primary care provider, please call [phone] and they will assist you.        Care EveryWhere ID     This is your Care EveryWhere ID. This could be used by other organizations to access your [facility] medical records  [id number]        Your Vitals Were     Pulse Pulse Oximetry                79 97%           Blood Pressure from Last 3 Encounters:   04/05/17 118/72   01/04/17 122/76   12/05/16 118/76    Weight from Last 3 Encounters:   01/04/17 93.4 kg (206 lb)   12/05/16 93.4 kg (206 lb)   11/28/16 93.3 kg (205 lb 11.2 oz)                 Today's Medication Changes          These changes are accurate as of: 4/5/17  4:55 PM.  If you have any questions, ask your nurse or doctor.               Start taking these medicines.        Dose/Directions    oxyCODONE 10 MG 12 hr tablet   Commonly known as:  OXYCONTIN   Used for:  Chronic right-sided low back pain with right-sided sciatica, Failed back syndrome of lumbar spine   Started by:  Prudence Almonte DO        Dose:  10 mg   Take 1 tablet (10 mg) by mouth every 12 hours   Quantity:  60 tablet   Refills:  0            Where to get your medicines      Some of these will need a paper prescription and others can be bought over the counter.  Ask your nurse if you have questions.     Bring a paper prescription for each of these medications     oxyCODONE 10 MG 12 hr tablet                Primary Care Provider Office Phone # Fax #    Priscilla Murphy [phone]       [facility] [address] [place] [address] [address] [place] [address]        Thank you!     Thank you for choosing [facility]  for your care. Our goal is always to provide you with excellent care. Hearing back from our patients is one way we can continue to improve our services. Please take a few minutes to complete the written survey that you may receive in the mail after your visit with us. Thank you!             Your Updated Medication List - Protect others  around you: Learn how to safely use, store and throw away your medicines at www.disposemymeds.org.          This list is accurate as of: 4/5/17  4:55 PM.  Always use your most recent med list.                   Brand Name Dispense Instructions for use    atorvastatin 40 MG tablet    LIPITOR    90 tablet    Take 1 tablet (40 mg) by mouth daily       Buprenorphine 15 MCG/HR Ptwk    BUTRANS    6 patch    Place 15 mcg/hr onto the skin once a week Change patch every 5 days.30 day supply. OK to dispense on/after 3/23/17 to start 3/25/17       buprenorphine HCl-naloxone HCl 8-2 MG per film    SUBOXONE    90 Film    Place 1 Film under the tongue 3 times daily       clonazePAM 1 MG tablet    klonoPIN    120 tablet    Take 1 tablet (1 mg) by mouth 4 times daily       ESCITALOPRAM OXALATE PO      Take 20 mg by mouth daily       esomeprazole 20 MG CR capsule    nexIUM    30 capsule    Take 1 capsule (20 mg) by mouth every morning (before breakfast) Take 30-60 minutes before eating.       methocarbamol 500 MG tablet    ROBAXIN    240 tablet    Take 500 mg by mouth 2 times daily Reported on 4/5/2017       omega 3 1000 MG Caps     90 capsule    Take 1 g by mouth daily       oxyCODONE 10 MG 12 hr tablet    OXYCONTIN    60 tablet    Take 1 tablet (10 mg) by mouth every 12 hours

## 2017-04-05 NOTE — PROGRESS NOTES
"                          [facility]    Date of visit: 4/5/2017    Chief complaint:   Chief Complaint   Patient presents with     Pain     low back       Interval history:  Diego Meng is a 41 year old male last seen by me on 1/4/17.    Since his last visit, Diego Meng reports:  -Low back pain and knee pain have gotten worse gradually over the past couple months. He is not sure what caused this. He did start a new job. Continues work as an .   -His low back pain is radiating down the right posterior thigh and leg to the heel. The pain is much worse in his back than in his leg. He had a lumbar epidural steroid injection after the surgery on 12/24/15, which flared up his pain. The right leg gives way at times. Denies any numbness but is having some tingling.   -Left knee pain continues despite the surgery. His right knee is also painful, but not as bad as his left knee. He has not followed up with Dr. Kaufman regarding this.   -Does not feel that Butrans is providing relief. He feels that it wears off after a couple days. He reports that it is expensive ($220/month).  He would like to change the regimen. He discusses that he wishes he could go back to when he was taking robaxin and norco #3/day or in college he took oxycontin 10 mg q8h. After his lumbar surgery, he has not been on a pain regimen that his is happy with. We discussed the role of pain psychology, and he is agreeable to this. We had a lengthy discussion about how anxiety and the \"noise in his head\" can be contributing to his pain cycle.   -His headaches are unchanged. They start at the back of his head/neck and radiate through his head to his eyes. We reviewed his cervical MRI and he is interested in proceeding with medial branch blocks. He has had medial branch blocks for his low back pain in the past, which did not provide significant relief. He attributes his headaches to rebound and has a very " difficult time discontinuing excedrin.     Pain scores:  Pain intensity on average is 8 (up from 5) on a scale of 0-10.     Current pain treatments:   butrans 15 mcg patch  excedrin #2-6/day for 20 years  lexapro 20 mg daily  Clonazepam 1 mg #1-2/day    Side Effects: no side effect    THE 4 A's OF OPIOID MAINTENANCE ANALGESIA    Analgesia:ok    Activity: ok     Adverse effects: none    Adherence to Rx protocol: good      Past pain treatments:  Diego GALDAMEZ Yusra has been seen at a pain clinic in the past. [facility] Dr. Vázquez and [facility]  Medications:                botox -no relief              Anti-migraine: fioricet, migranal, eletriptan, frovatriptan, midrin, naraptriptan, sumatriptan, zolmitripatn -no relief from any of these              Anti-convulsants: depakote, topiramate, gabapentin, lyrica -all of these made him feel fuzzy and forgetful and they were not helpful              Opioids: oxycontin, morphine -no relief, hydrocodone -no relief, tramadol -no relief                Muscle relaxants: soma -no relief, flexeril -no relief and made him tired, norflex -no relief, tizanidine -no relief and made him tired              NSAIDs: allergic to this class of medications -rash, abdominal pain and cramping              Anti-depressants: cymbalta -no relief                Topicals: lidocaine -no relief  PT: [facility]las in 2016 -helped for shoulder but flared up back and neck  Psychology: yes for pain at the [facility] -'continues to use these skills'  Acupuncture: tried it -no relief  Chiropractic care: tried it -helped initially but then flared up pain  TENS Unit: no relief  Injections: R L4 and L5 TF BAY 12/24/15 -flared pain, cervical epidural steroid injection -provided 2-4 weeks of relief but had significant flare up for 2 weeks post-procedure, cervical trigger point injections -minimal relief, lumbar medial branch block -no relief  Surgery: right L4-L5  hemilaminectomy May 2015 Dr. Rios    Medications:  Current Outpatient Prescriptions   Medication Sig Dispense Refill     clonazePAM (KLONOPIN) 1 MG tablet Take 1 tablet (1 mg) by mouth 4 times daily 120 tablet 0     Buprenorphine (BUTRANS) 15 MCG/HR PTWK Place 15 mcg/hr onto the skin once a week Change patch every 5 days.30 day supply. OK to dispense on/after 3/23/17 to start 3/25/17 6 patch 0     atorvastatin (LIPITOR) 40 MG tablet Take 1 tablet (40 mg) by mouth daily 90 tablet 1     esomeprazole (NEXIUM) 20 MG capsule Take 1 capsule (20 mg) by mouth every morning (before breakfast) Take 30-60 minutes before eating. 30 capsule 0     omega 3 1000 MG CAPS Take 1 g by mouth daily 90 capsule      ESCITALOPRAM OXALATE PO Take 20 mg by mouth daily       buprenorphine HCl-naloxone HCl (SUBOXONE) 8-2 MG film Place 1 Film under the tongue 3 times daily (Patient not taking: Reported on 4/5/2017) 90 Film 0     methocarbamol (ROBAXIN) 500 MG tablet Take 500 mg by mouth 2 times daily Reported on 4/5/2017 240 tablet        Medical History: any changes in medical history since they were last seen? no    Review of Systems:  The 14 system ROS was reviewed from the intake questionnaire, and is positive for: headache  Any bowel or bladder problems: none  Mood: anxiety    Physical Exam:  Blood pressure 118/72, pulse 79, SpO2 97 %.  General: no acute distress, pleasant, sitting comfortably  Gait: normal  MSK exam: baseline    Imaging:  Lumbar MRI completed on 8/1/15 showed:  CONCLUSION:    1. Interval right hemilaminotomy and partial facetectomy at L4-L5. No recurrent disc herniation.  2. Other minor degenerative changes. Annular fissures L4-L5 and L5-S1. No high-grade central canal or foraminal stenosis at any level.    Cervical spine MRI completed on 11/14/14:  FINDINGS: Cervical spine visualized through the upper thoracic spine. Lordosis normal. Slight retrolisthesis at each level between C3 and C6. Vertebral body heights are  normal. Marrow signal is normal, including the facets. Paraspinous soft tissues are unremarkable. Visible posterior fossa contents are unremarkable. Cord signal and morphology is normal.  CRANIOCERVICAL JUNCTION: Mild anterior spurring at C1-C2. Foramen magnum and central canal are adequate.  C2-C3: Disc height normal. Mild facet hypertrophy. No central canal or foraminal stenosis.  C3-C4: Disc height normal. Slight right uncinate hypertrophy. Minimal facet hypertrophy. No significant central canal or foraminal stenosis.    C4-C5: Slight disc height loss. Mild bilateral facet hypertrophy. No central canal or foraminal stenosis.  C5-C6: Mild disc height loss with left eccentric disc osteophyte complex. Mild bilateral facet hypertrophy. No central canal or right foraminal stenosis. Moderate left foraminal stenosis.    C6-C7: Slight disc height loss. Mild facet hypertrophy. No central canal or foraminal stenosis.  C7-T1: Mild disc height loss. Mild to moderate bilateral facet hypertrophy. No central canal or foraminal stenosis.  CONCLUSION:  1. Moderate left foraminal stenosis at C5-C6.  2. Other relatively minor degenerative changes, no further high-grade central canal or foraminal stenosis.     Right shoulder x-ray completed on 4/2/15:  FINDINGS: No evidence of fracture or dislocation. Normal glenohumeral joint alignment without evidence of degenerative changes. No evidence of significant acromioclavicular joint degenerative joint disease. No acromial or coracoid enthesophyte. Mild undersurface acromial sclerosis. Downslope type three acromion. No blastic or lytic lesions.  IMPRESSION:  Right shoulder type three acromion otherwise within normal limits.     [place] Board of Pharmacy Data Base Reviewed: YES; as expected, no concern for abuse or misuse    Assessment:   1. Chronic pain syndrome  2. Opioid dependency  3. Chronic low back pain. Right L4-L5 hemilaminectomy partial facetectomy in May 2015 with   [facility]  4. Chronic neck pain. Cervical facet arthropathy.  5. Chronic headaches. Likely related to facet arthropathy, psychogenic, and rebound (excedrin)  6. Anxiety with panic attacks. Takes lexapro and clonazepam.   7. Tobacco use disorder  8. Left knee meniscus repair on 11/28/16 with Dr. Kaufman. Chronic right knee pain as well.   9. No self cares. Reliant on medication.     Plan:  1. Clinical Health Psychologist to address issues of relaxation, behavioral change, coping style, and other factors important to improvement: Schedule an evaluation with Dr. Benitez.   2. Medication Management: UDS with expected and appropriate results and opioid agreement on 11/2/16  -Discontinue Butrans 15 mcg.  -Start Oxycontin 10 mg every 12 hours. Avoid any dose escalations. May consider rotating back to buprenorphine if ineffective.  3. Follow up: with Dr. Almonte in 1 month  4. Diagnostic studies: lumbar spine -I will call with the results.   5. Follow up with Dr. Kaufman regarding knee pain.     Total time spent was 30 minutes, and more than 50% of face to face time was spent in counseling and/or coordination of care regarding the above assessment and plan.    Prudence Almonte DO  [facility]  [facility]

## 2017-04-05 NOTE — NURSING NOTE
"No chief complaint on file.      Initial /72  Pulse 79  SpO2 97% Estimated body mass index is 28.73 kg/(m^2) as calculated from the following:    Height as of 11/28/16: 1.803 m (5' 11\").    Weight as of 1/4/17: 93.4 kg (206 lb).  Medication Reconciliation: joseph Gutierrez CMA   [facility]    "

## 2017-04-17 ENCOUNTER — OFFICE VISIT (OUTPATIENT)
Dept: PALLIATIVE MEDICINE | Facility: CLINIC | Age: 42
End: 2017-04-17
Payer: COMMERCIAL

## 2017-04-17 VITALS
BODY MASS INDEX: 28.73 KG/M2 | OXYGEN SATURATION: 97 % | WEIGHT: 206 LBS | SYSTOLIC BLOOD PRESSURE: 124 MMHG | HEART RATE: 80 BPM | DIASTOLIC BLOOD PRESSURE: 81 MMHG

## 2017-04-17 DIAGNOSIS — F11.20 CONTINUOUS OPIOID DEPENDENCE (H): ICD-10-CM

## 2017-04-17 DIAGNOSIS — G89.4 CHRONIC PAIN SYNDROME: ICD-10-CM

## 2017-04-17 PROCEDURE — 99214 OFFICE O/P EST MOD 30 MIN: CPT | Performed by: PHYSICAL MEDICINE & REHABILITATION

## 2017-04-17 RX ORDER — BUPRENORPHINE AND NALOXONE 8; 2 MG/1; MG/1
1 FILM, SOLUBLE BUCCAL; SUBLINGUAL EVERY 6 HOURS
Qty: 120 FILM | Refills: 0 | Status: SHIPPED | OUTPATIENT
Start: 2017-04-17 | End: 2017-06-13

## 2017-04-17 ASSESSMENT — PAIN SCALES - GENERAL: PAINLEVEL: EXTREME PAIN (8)

## 2017-04-17 NOTE — PROGRESS NOTES
"                          [facility]    Date of visit: 4/17/2017    Chief complaint:   No chief complaint on file.      Interval history:  Diego Meng is a 41 year old male last seen by me on 4/5/17.    Since his last visit, Diego Meng reports:  -Lumbar MRI is scheduled in 1 week.   -The transition from butrans patch to oxycontin did not go well. He is having more pain, and would like to go back on the buprenorphine. He reports that he feels his tolerance is higher and that's why the oxycontin is not providing relief. He also requested to consider fentanyl patch, and after discussion that tolerance is also an issue with this opioid, he agreed with going back on buprenorphine.   -Low back pain and knee pain have gotten worse gradually over the past couple months. He is not sure what caused this. He did start a new job. Continues work as an .   -His low back pain is radiating down the right posterior thigh and leg to the heel. The pain is much worse in his back than in his leg. He had a lumbar epidural steroid injection after the surgery on 12/24/15, which flared up his pain. The right leg gives way at times. Denies any numbness but is having some tingling.   -Left knee pain continues despite the surgery. His right knee is also painful, but not as bad as his left knee. He has not followed up with Dr. Kaufman regarding this.   -He has not scheduled with Dr. Benitez yet. We had a lengthy discussion about how anxiety and the \"noise in his head\" can be contributing to his pain cycle.   -His headaches are unchanged. They start at the back of his head/neck and radiate through his head to his eyes. We reviewed his cervical MRI and he is interested in proceeding with medial branch blocks. He has had medial branch blocks for his low back pain in the past, which did not provide significant relief. He attributes his headaches to rebound and has a very difficult time discontinuing " excedrin.     Pain scores:  Pain intensity on average is 7 (up from 5) on a scale of 0-10.     Current pain treatments:   oxycontin 10 mg q12h  excedrin #2-6/day for 20 years  lexapro 20 mg daily  Clonazepam 1 mg #1-2/day    Side Effects: no side effect    THE 4 A's OF OPIOID MAINTENANCE ANALGESIA    Analgesia:minimal    Activity: less    Adverse effects: none    Adherence to Rx protocol: good      Past pain treatments:  Diego GALDAMEZ Yusra has been seen at a pain clinic in the past. [facility] Dr. Vázquez and [facility]  Medications:                botox -no relief              Anti-migraine: fioricet, migranal, eletriptan, frovatriptan, midrin, naraptriptan, sumatriptan, zolmitripatn -no relief from any of these              Anti-convulsants: depakote, topiramate, gabapentin, lyrica -all of these made him feel fuzzy and forgetful and they were not helpful              Opioids: oxycontin, morphine -no relief, hydrocodone -no relief, tramadol -no relief                Muscle relaxants: soma -no relief, flexeril -no relief and made him tired, norflex -no relief, tizanidine -no relief and made him tired              NSAIDs: allergic to this class of medications -rash, abdominal pain and cramping              Anti-depressants: cymbalta -no relief                Topicals: lidocaine -no relief  PT: [facility] Islas in 2016 -helped for shoulder but flared up back and neck  Psychology: yes for pain at the [facility] -'continues to use these skills'  Acupuncture: tried it -no relief  Chiropractic care: tried it -helped initially but then flared up pain  TENS Unit: no relief  Injections: R L4 and L5 TF BAY 12/24/15 -flared pain, cervical epidural steroid injection -provided 2-4 weeks of relief but had significant flare up for 2 weeks post-procedure, cervical trigger point injections -minimal relief, lumbar medial branch block -no relief  Surgery: right L4-L5 hemilaminectomy May  2015 Dr. Rios    Medications:  Current Outpatient Prescriptions   Medication Sig Dispense Refill     oxyCODONE (OXYCONTIN) 10 MG 12 hr tablet Take 1 tablet (10 mg) by mouth every 12 hours 60 tablet 0     clonazePAM (KLONOPIN) 1 MG tablet Take 1 tablet (1 mg) by mouth 4 times daily 120 tablet 0     Buprenorphine (BUTRANS) 15 MCG/HR PTWK Place 15 mcg/hr onto the skin once a week Change patch every 5 days.30 day supply. OK to dispense on/after 3/23/17 to start 3/25/17 6 patch 0     atorvastatin (LIPITOR) 40 MG tablet Take 1 tablet (40 mg) by mouth daily 90 tablet 1     buprenorphine HCl-naloxone HCl (SUBOXONE) 8-2 MG film Place 1 Film under the tongue 3 times daily (Patient not taking: Reported on 4/5/2017) 90 Film 0     methocarbamol (ROBAXIN) 500 MG tablet Take 500 mg by mouth 2 times daily Reported on 4/5/2017 240 tablet      esomeprazole (NEXIUM) 20 MG capsule Take 1 capsule (20 mg) by mouth every morning (before breakfast) Take 30-60 minutes before eating. 30 capsule 0     omega 3 1000 MG CAPS Take 1 g by mouth daily 90 capsule      ESCITALOPRAM OXALATE PO Take 20 mg by mouth daily         Medical History: any changes in medical history since they were last seen? no    Review of Systems:  The 14 system ROS was reviewed from the intake questionnaire, and is positive for: headache  Any bowel or bladder problems: none  Mood: anxiety    Physical Exam:  There were no vitals taken for this visit.  General: no acute distress, pleasant, sitting comfortably  Gait: antalgic  MSK: full strength and sensation    Imaging:  Lumbar MRI completed on 8/1/15 showed:  CONCLUSION:    1. Interval right hemilaminotomy and partial facetectomy at L4-L5. No recurrent disc herniation.  2. Other minor degenerative changes. Annular fissures L4-L5 and L5-S1. No high-grade central canal or foraminal stenosis at any level.    Cervical spine MRI completed on 11/14/14:  FINDINGS: Cervical spine visualized through the upper thoracic spine.  Lordosis normal. Slight retrolisthesis at each level between C3 and C6. Vertebral body heights are normal. Marrow signal is normal, including the facets. Paraspinous soft tissues are unremarkable. Visible posterior fossa contents are unremarkable. Cord signal and morphology is normal.  CRANIOCERVICAL JUNCTION: Mild anterior spurring at C1-C2. Foramen magnum and central canal are adequate.  C2-C3: Disc height normal. Mild facet hypertrophy. No central canal or foraminal stenosis.  C3-C4: Disc height normal. Slight right uncinate hypertrophy. Minimal facet hypertrophy. No significant central canal or foraminal stenosis.    C4-C5: Slight disc height loss. Mild bilateral facet hypertrophy. No central canal or foraminal stenosis.  C5-C6: Mild disc height loss with left eccentric disc osteophyte complex. Mild bilateral facet hypertrophy. No central canal or right foraminal stenosis. Moderate left foraminal stenosis.    C6-C7: Slight disc height loss. Mild facet hypertrophy. No central canal or foraminal stenosis.  C7-T1: Mild disc height loss. Mild to moderate bilateral facet hypertrophy. No central canal or foraminal stenosis.  CONCLUSION:  1. Moderate left foraminal stenosis at C5-C6.  2. Other relatively minor degenerative changes, no further high-grade central canal or foraminal stenosis.     Right shoulder x-ray completed on 4/2/15:  FINDINGS: No evidence of fracture or dislocation. Normal glenohumeral joint alignment without evidence of degenerative changes. No evidence of significant acromioclavicular joint degenerative joint disease. No acromial or coracoid enthesophyte. Mild undersurface acromial sclerosis. Downslope type three acromion. No blastic or lytic lesions.  IMPRESSION:  Right shoulder type three acromion otherwise within normal limits.     [place] Board of Pharmacy Data Base Reviewed: YES; as expected, no concern for abuse or misuse    Assessment:   1. Chronic pain syndrome  2. Opioid dependency  3.  Chronic low back pain. Right L4-L5 hemilaminectomy partial facetectomy in May 2015 with Dr. Rios. Now with right sided radicular symptoms.   4. Chronic neck pain. Cervical facet arthropathy.  5. Chronic headaches. Likely related to facet arthropathy, psychogenic, and rebound (excedrin)  6. Anxiety with panic attacks. Takes lexapro and clonazepam.   7. Tobacco use disorder  8. Left knee meniscus repair on 11/28/16 with Dr. Kaufman. Chronic right knee pain as well.   9. No self cares. Reliant on medication.     Plan:  1. Clinical Health Psychologist to address issues of relaxation, behavioral change, coping style, and other factors important to improvement: Schedule an evaluation with Dr. Benitez.   2. Medication Management: UDS with expected and appropriate results and opioid agreement on 11/2/16  -Discontinue Oxycontin and then at least 12 hours later, start suboxone 2-0.5 mg every 6 hours for pain and opioid dependency.  3. Follow up: with Dr. Almonte in 1 month  4. Diagnostic studies: lumbar spine -I will call with the results.   5. Follow up with Dr. Kaufman regarding knee pain.     Total time spent was 30 minutes, and more than 50% of face to face time was spent in counseling and/or coordination of care regarding the above assessment and plan.    Prudence Almonte,   [facility]  [facility]

## 2017-04-17 NOTE — NURSING NOTE
"Chief Complaint   Patient presents with     Pain       Initial /81  Pulse 80  Wt 93.4 kg (206 lb)  SpO2 97%  BMI 28.73 kg/m2 Estimated body mass index is 28.73 kg/(m^2) as calculated from the following:    Height as of 11/28/16: 1.803 m (5' 11\").    Weight as of this encounter: 93.4 kg (206 lb).  Medication Reconciliation: joseph Gutierrez CMA   [facility]    "

## 2017-04-17 NOTE — MR AVS SNAPSHOT
After Visit Summary   4/17/2017    Diego Meng    MRN: [id number]           Patient Information     Date Of Birth          1975        Visit Information        Provider Department      4/17/2017 12:00 PM Prudence Almonte DO [facility]        Today's Diagnoses     Chronic pain syndrome        Continuous opioid dependence (H)          Care Instructions        ----------------------------------------------------------------  Nurse Triage line:  [phone]   Call this number with any questions or concerns. You may leave a detailed message anytime. Calls are typically returned Monday through Friday between 8 AM and 4:30 PM. We usually get back to you within 2 business days depending on the issue/request.       Medication refills:    For non-narcotic medications, call your pharmacy directly to request a refill. The pharmacy will contact the [facility] Management Center for authorization. Please allow 3-4 days for these refills to be processed.     For narcotic refills, call the nurse triage line or send a [employer] message. Please contact us 7-10 days before your refill is due. The message MUST include the name of the specific medication(s) requested and how you would like to receive the prescription(s). The options are as follows:    Pain Clinic staff can mail the prescription to your pharmacy. Please tell us the name of the pharmacy.    You may pick the prescription up at the Pain Clinic (tell us the location) or during a clinic visit with your pain provider    Pain Clinic staff can deliver the prescription to the [place] pharmacy in the clinic building. Please tell us the location.      Scheduling number: [phone].  Call this number to schedule or change appointments.    We believe regular attendance is key to your success in our program.    Any time you are unable to keep your appointment we ask that you call us at least 24 hours in advance to let us know. This will allow us  to offer the appointment time to another patient.           Follow-ups after your visit        Your next 10 appointments already scheduled     Apr 24, 2017  7:00 AM CDT   MR LUMBAR SPINE W/O CONTRAST with RHMR1   [facility] ([facility])    [address]  [facility] [address]   [phone]           Take your medicines as usual, unless your doctor tells you not to. Bring a list of your current medicines to your exam (including vitamins, minerals and over-the-counter drugs). Also bring the results of similar scans you may have had.  Please remove any body piercings and hair extensions before you arrive.  Follow your doctor s orders. If you do not, we may have to postpone your exam.  You will not have contrast for this exam. You do not need to do anything special to prepare.  The MRI machine uses a strong magnet. Please wear clothes without metal (snaps, zippers). A sweatsuit works well, or we may give you a hospital gown.   **IMPORTANT** THE INSTRUCTIONS BELOW ARE ONLY FOR THOSE PATIENTS WHO HAVE BEEN TOLD THEY WILL RECEIVE SEDATION OR GENERAL ANESTHESIA DURING THEIR MRI PROCEDURE:  IF YOU WILL RECEIVE SEDATION (take medicine to help you relax during your exam):   You must get the medicine from your doctor before you arrive. Bring the medicine to the exam. Do not take it at home.   Arrive one hour early. Bring someone who can take you home after the test. Your medicine will make you sleepy. After the exam, you may not drive, take a bus or take a taxi by yourself.   No eating 8 hours before your exam. You may have clear liquids up until 4 hours before your exam. (Clear liquids include water, clear tea, black coffee and fruit juice without pulp.)  IF YOU WILL RECEIVE ANESTHESIA (be asleep for your exam):   Arrive 1 1/2 hours early. Bring someone who can take you home after the test. You may not drive, take a bus or take a taxi by yourself.   No eating 8 hours before your exam. You may  have clear liquids up until 4 hours before your exam. (Clear liquids include water, clear tea, black coffee and fruit juice without pulp.)   You will spend four to five hours in the recovery room.  Please call the Imaging Department at your exam site with any questions.              Who to contact     If you have questions or need follow up information about today's clinic visit or your schedule please contact [facility] directly at [phone].  Normal or non-critical lab and imaging results will be communicated to you by [employer], letter or phone within 4 business days after the clinic has received the results. If you do not hear from us within 7 days, please contact the clinic through [employer] or phone. If you have a critical or abnormal lab result, we will notify you by phone as soon as possible.  Submit refill requests through [employer] or call your pharmacy and they will forward the refill request to us. Please allow 3 business days for your refill to be completed.          Additional Information About Your Visit        [employer] Information     [employer] gives you secure access to your electronic health record. If you see a primary care provider, you can also send messages to your care team and make appointments. If you have questions, please call your primary care clinic.  If you do not have a primary care provider, please call [phone] and they will assist you.        Care EveryWhere ID     This is your Care EveryWhere ID. This could be used by other organizations to access your [place] medical records  [id number]        Your Vitals Were     Pulse Pulse Oximetry BMI (Body Mass Index)             80 97% 28.73 kg/m2          Blood Pressure from Last 3 Encounters:   04/17/17 124/81   04/05/17 118/72   01/04/17 122/76    Weight from Last 3 Encounters:   04/17/17 93.4 kg (206 lb)   01/04/17 93.4 kg (206 lb)   12/05/16 93.4 kg (206 lb)              Today, you had the following     No orders found  for display         Today's Medication Changes          These changes are accurate as of: 4/17/17 12:34 PM.  If you have any questions, ask your nurse or doctor.               These medicines have changed or have updated prescriptions.        Dose/Directions    buprenorphine HCl-naloxone HCl 8-2 MG per film   Commonly known as:  SUBOXONE   This may have changed:  when to take this   Used for:  Chronic pain syndrome, Continuous opioid dependence (H)        Dose:  1 Film   Place 1 Film under the tongue every 6 hours   Quantity:  120 Film   Refills:  0         Stop taking these medicines if you haven't already. Please contact your care team if you have questions.     Buprenorphine 15 MCG/HR Ptwk   Commonly known as:  BUTRANS           oxyCODONE 10 MG 12 hr tablet   Commonly known as:  OXYCONTIN                Where to get your medicines      Some of these will need a paper prescription and others can be bought over the counter.  Ask your nurse if you have questions.     Bring a paper prescription for each of these medications     buprenorphine HCl-naloxone HCl 8-2 MG per film                Primary Care Provider Office Phone # Fax #    Priscilla Murphy [phone]       [facility] [address] [place] [address]        Thank you!     Thank you for choosing [facility]  for your care. Our goal is always to provide you with excellent care. Hearing back from our patients is one way we can continue to improve our services. Please take a few minutes to complete the written survey that you may receive in the mail after your visit with us. Thank you!             Your Updated Medication List - Protect others around you: Learn how to safely use, store and throw away your medicines at www.disposemymeds.org.          This list is accurate as of: 4/17/17 12:34 PM.  Always use your most recent med list.                   Brand Name Dispense Instructions for use     atorvastatin 40 MG tablet    LIPITOR    90 tablet    Take 1 tablet (40 mg) by mouth daily       buprenorphine HCl-naloxone HCl 8-2 MG per film    SUBOXONE    120 Film    Place 1 Film under the tongue every 6 hours       clonazePAM 1 MG tablet    klonoPIN    120 tablet    Take 1 tablet (1 mg) by mouth 4 times daily       ESCITALOPRAM OXALATE PO      Take 20 mg by mouth daily       esomeprazole 20 MG CR capsule    nexIUM    30 capsule    Take 1 capsule (20 mg) by mouth every morning (before breakfast) Take 30-60 minutes before eating.       methocarbamol 500 MG tablet    ROBAXIN    240 tablet    Take 500 mg by mouth 2 times daily Reported on 4/17/2017       omega 3 1000 MG Caps     90 capsule    Take 1 g by mouth daily

## 2017-04-18 DIAGNOSIS — F41.1 GAD (GENERALIZED ANXIETY DISORDER): ICD-10-CM

## 2017-04-18 DIAGNOSIS — M96.1 FAILED BACK SYNDROME OF LUMBAR SPINE: ICD-10-CM

## 2017-04-18 DIAGNOSIS — M47.812 CERVICAL SPONDYLOSIS WITHOUT MYELOPATHY: ICD-10-CM

## 2017-04-18 DIAGNOSIS — F41.0 PANIC ATTACK: ICD-10-CM

## 2017-04-18 DIAGNOSIS — G89.4 CHRONIC PAIN SYNDROME: ICD-10-CM

## 2017-04-18 NOTE — TELEPHONE ENCOUNTER
Klonopin 1mg  Last Written Prescription Date:  3/15/17  Last Fill Quantity: 120,   # refills: 0  Last Office Visit with [facility], [facility] or [facility] prescribing provider: 11/25/16  Future Office visit:       Routing refill request to provider for review/approval because:  Drug not on the [facility], [facility] or [facility] refill protocol or controlled substance    Magi Betancourt,   [facility]

## 2017-04-18 NOTE — TELEPHONE ENCOUNTER
Received fax from [facility] that states Rx for buprenorphine HCl-naloxone HCl (SUBOXONE) 8-2 MG per film is not covered under patient's insurance.    Insurance Name: [facility]  Phone#: [phone]  ID#: [id number]  BIN: [id number]  PCN: YULI Gutierrez, [facility]

## 2017-04-19 NOTE — TELEPHONE ENCOUNTER
Pt called wanting to know the status of the prior auth. Would like an update. Please call. Phone # [phone].    Samina Cuellar    [facility]

## 2017-04-20 RX ORDER — CLONAZEPAM 1 MG/1
1 TABLET ORAL 4 TIMES DAILY
Qty: 120 TABLET | Refills: 0 | Status: SHIPPED | OUTPATIENT
Start: 2017-04-20 | End: 2017-05-17

## 2017-04-20 NOTE — TELEPHONE ENCOUNTER
Printed, signed, in my outbox.    Priscilla Murphy MD  Internal Medicine/Pediatrics  [facility]

## 2017-04-20 NOTE — TELEPHONE ENCOUNTER
PA faxed to patient's insurance. Patient notified. Patient may need a bridge of pain medication until PA is approved. Will discuss with Dr. Almonte on Friday when she returns to clinic.   Joana Gutierrez, [facility]

## 2017-04-20 NOTE — TELEPHONE ENCOUNTER
Patient left  8:40 am, he would like an update on his PA for suboxone.        Mitzi MONTOYA    [facility]

## 2017-04-20 NOTE — TELEPHONE ENCOUNTER
[facility] message sent to Joana Gutierrez MA, who received the fax from the pharmacy, to ascertain actions taken regarding this PA.    Joana Christianson MSN, RN-BC  Care Coordinator  [facility] [place]

## 2017-04-20 NOTE — TELEPHONE ENCOUNTER
Patient called wanting to let care team know that his OOP for Suboxone is around $400. Patient won't be able to do that.     Brigette Almonte    [facility]

## 2017-04-21 RX ORDER — BUPRENORPHINE 15 UG/H
15 PATCH TRANSDERMAL WEEKLY
Qty: 6 PATCH | Refills: 0 | Status: SHIPPED | OUTPATIENT
Start: 2017-04-24 | End: 2017-05-15 | Stop reason: ALTCHOICE

## 2017-04-21 NOTE — TELEPHONE ENCOUNTER
Pt LM at 0952 on 4/21 in regards to PA on Suboxone and out of pocket costs. It appears all questions were addressed in previous RN note at 1040 on 4/21. Phone# [phone].    Samina Cuellar    [facility]

## 2017-04-21 NOTE — TELEPHONE ENCOUNTER
Rx faxed to:        [facility] [address] [place], [place] - [address] AT [place] [address]  [address] [address] [address]  Phone: [phone] Fax: [phone]      Fax confirmation received. Hardcopy destroyed. Patient notified.    Joana Gutierrez CMA   [facility]

## 2017-04-21 NOTE — TELEPHONE ENCOUNTER
Spoke with Diego - he says he's open to either option, trial of Belbuca or just going back to Butrans patch. His concern with Belbuca is that it's newer, concerned that less is known about it, and it would again require going through the prior authorization process.     He called his insurance company, [employer], who said they're not near coming to a decision about the Suboxone and they let him know what his out of pocket would be for the Suboxone if approved, and it won't be possible for him to pay that.     After talking it through, he stated he would just like to go back to the Butrans patch after all. Medications reviewed with patient and he would be due for new RX in 3 days.     Routing to provider to review conversation and fill Butrans if appropriate.     Received call from patient requesting refill(s) of Butrans 15mcg patch      Last picked up from pharmacy on 3/23/17 to start 3/25/17  Due date 4/24/17    Pt last seen on 4/17/17  Next appt scheduled NONE    Last urine drug screen 11/2/16  Current opioid agreement on file Yes Date: 11/2/16    Processing (pick one):      Fax to [facility]    Will facilitate refill.

## 2017-04-21 NOTE — TELEPHONE ENCOUNTER
It would be appropriate to either trial belbuca or go back to butrans patch if suboxone is too expensive. Please check with patient.    Prudence Almonte,   [facility]

## 2017-04-24 ENCOUNTER — TELEPHONE (OUTPATIENT)
Dept: PALLIATIVE MEDICINE | Facility: CLINIC | Age: 42
End: 2017-04-24

## 2017-04-24 ENCOUNTER — HOSPITAL ENCOUNTER (OUTPATIENT)
Dept: MRI IMAGING | Facility: CLINIC | Age: 42
Discharge: HOME | End: 2017-04-24
Attending: PHYSICAL MEDICINE & REHABILITATION | Admitting: PHYSICAL MEDICINE & REHABILITATION
Payer: COMMERCIAL

## 2017-04-24 DIAGNOSIS — G89.29 CHRONIC RIGHT-SIDED LOW BACK PAIN WITH RIGHT-SIDED SCIATICA: ICD-10-CM

## 2017-04-24 DIAGNOSIS — M51.369 DDD (DEGENERATIVE DISC DISEASE), LUMBAR: Primary | ICD-10-CM

## 2017-04-24 DIAGNOSIS — M54.41 CHRONIC RIGHT-SIDED LOW BACK PAIN WITH RIGHT-SIDED SCIATICA: ICD-10-CM

## 2017-04-24 PROCEDURE — A9585 GADOBUTROL INJECTION: HCPCS | Performed by: PHYSICAL MEDICINE & REHABILITATION

## 2017-04-24 PROCEDURE — 25500064 ZZH RX 255 OP 636: Performed by: PHYSICAL MEDICINE & REHABILITATION

## 2017-04-24 PROCEDURE — 72158 MRI LUMBAR SPINE W/O & W/DYE: CPT

## 2017-04-24 RX ORDER — GADOBUTROL 604.72 MG/ML
10 INJECTION INTRAVENOUS ONCE
Status: COMPLETED | OUTPATIENT
Start: 2017-04-24 | End: 2017-04-24

## 2017-04-24 RX ADMIN — GADOBUTROL 9 ML: 604.72 INJECTION INTRAVENOUS at 07:13

## 2017-04-24 NOTE — TELEPHONE ENCOUNTER
Called patient to review the MRI results. Discussed options including adjuvant medications, which he has tried and either had side effects or no relief. He is back on butrans 15 mcg. May consider 20 mcg in 1-2 weeks. Recommended to start physical therapy with Sarah Chakraborty ([place]), Sarah Chopra ([place]), or Mariya Mireles ([place]).      Prudence Almonte DO  [facility]

## 2017-04-26 ENCOUNTER — TELEPHONE (OUTPATIENT)
Dept: PALLIATIVE MEDICINE | Facility: CLINIC | Age: 42
End: 2017-04-26

## 2017-04-26 DIAGNOSIS — M54.40 CHRONIC BILATERAL LOW BACK PAIN WITH SCIATICA, SCIATICA LATERALITY UNSPECIFIED: Primary | ICD-10-CM

## 2017-04-26 DIAGNOSIS — G89.29 CHRONIC BILATERAL LOW BACK PAIN WITH SCIATICA, SCIATICA LATERALITY UNSPECIFIED: Primary | ICD-10-CM

## 2017-04-26 NOTE — TELEPHONE ENCOUNTER
Butrans and oxycontin are two long-acting pain medications, so he should not take them together.    Prudence Almonte,   [facility]

## 2017-04-26 NOTE — TELEPHONE ENCOUNTER
"VM left today at 12:04 pm    Reporting pain in lower back, right shoulder and arm.  \"Can I take my Oxy on top of my Butrans?\"    Can be reached at: [phone]    Patricia CHA    [facility] [place]    "

## 2017-04-26 NOTE — TELEPHONE ENCOUNTER
When I talked with him on the phone on 4/24/17, I recommended PT with a spine specialist. Has he started this?    He has tried multiple adjunctive medications in the various medication classes that we use to treat pain either with no relief or with side effects.     If he is now having more shoulder pain, he needs to follow up with orthopedics regarding this. He completed physical therapy for his shoulder in 2016 with some relief of his shoulder pain, but now that this pain has increased, he should go back to see orthopedics for this.     Regarding his low back pain, he could try an epidural steroid injection. I realize he had an injection shortly after his lumbar laminectomy surgery in 2015, which flared up his pain, but now that there has been some time since surgery, I recommend giving it another try.     Sometimes oral steroids are considered for pain flare ups and this may be an option as well. Regarding ongoing chronic pain medication, the Butrans patch can be increased from 15 mcg to 20 mcg after being back on the Butrans for 1-2 weeks.     Prudence Almonte DO  [facility]

## 2017-04-26 NOTE — TELEPHONE ENCOUNTER
Called and spoke to Diego he reports the Butrans 15 mcg is not working very well and he reports his pain is an 8 for his low back pain and a 10 for his neck.     We discussed that he should not combine the OxyContin and Butrans as they are both long acting and he understood.    He is requesting an additional medication to use with the Butrans patch.    TARI MelvinN, RN  Care Coordinator  [facility] [place]

## 2017-04-27 ENCOUNTER — OFFICE VISIT (OUTPATIENT)
Dept: PEDIATRICS | Facility: CLINIC | Age: 42
End: 2017-04-27
Payer: COMMERCIAL

## 2017-04-27 ENCOUNTER — TELEPHONE (OUTPATIENT)
Dept: PEDIATRICS | Facility: CLINIC | Age: 42
End: 2017-04-27

## 2017-04-27 VITALS
TEMPERATURE: 97.2 F | SYSTOLIC BLOOD PRESSURE: 108 MMHG | BODY MASS INDEX: 29.22 KG/M2 | HEIGHT: 71 IN | DIASTOLIC BLOOD PRESSURE: 70 MMHG | OXYGEN SATURATION: 99 % | HEART RATE: 66 BPM | WEIGHT: 208.7 LBS

## 2017-04-27 DIAGNOSIS — G89.29 OTHER CHRONIC PAIN: Primary | ICD-10-CM

## 2017-04-27 PROCEDURE — 99214 OFFICE O/P EST MOD 30 MIN: CPT | Performed by: NURSE PRACTITIONER

## 2017-04-27 ASSESSMENT — PATIENT HEALTH QUESTIONNAIRE - PHQ9: 5. POOR APPETITE OR OVEREATING: NEARLY EVERY DAY

## 2017-04-27 ASSESSMENT — ANXIETY QUESTIONNAIRES
2. NOT BEING ABLE TO STOP OR CONTROL WORRYING: SEVERAL DAYS
3. WORRYING TOO MUCH ABOUT DIFFERENT THINGS: SEVERAL DAYS
7. FEELING AFRAID AS IF SOMETHING AWFUL MIGHT HAPPEN: NOT AT ALL
IF YOU CHECKED OFF ANY PROBLEMS ON THIS QUESTIONNAIRE, HOW DIFFICULT HAVE THESE PROBLEMS MADE IT FOR YOU TO DO YOUR WORK, TAKE CARE OF THINGS AT HOME, OR GET ALONG WITH OTHER PEOPLE: SOMEWHAT DIFFICULT
5. BEING SO RESTLESS THAT IT IS HARD TO SIT STILL: NEARLY EVERY DAY
6. BECOMING EASILY ANNOYED OR IRRITABLE: MORE THAN HALF THE DAYS
1. FEELING NERVOUS, ANXIOUS, OR ON EDGE: NEARLY EVERY DAY
GAD7 TOTAL SCORE: 13

## 2017-04-27 NOTE — TELEPHONE ENCOUNTER
Please call pain clinic and let them know I'd like to talk to Dr. Suzy garduno. I know she isn't in clinic today but wondering if we can talk tomorrow.

## 2017-04-27 NOTE — MR AVS SNAPSHOT
"              After Visit Summary   4/27/2017    Diego Meng    MRN: [id number]           Patient Information     Date Of Birth          1975        Visit Information        Provider Department      4/27/2017 2:20 PM Priscilla Son APRN [facility] [place]        Care Instructions    1. Steven a[facility] MBSR for chronic pain \"[employer] [facility]\"        Follow-ups after your visit        Your next 10 appointments already scheduled     May 03, 2017  5:50 PM CDT   [facility] with Sarah Chakraborty, PT   [facility] [place] [facility] [place]    [address]  [place] [address]   [phone]            May 10, 2017  5:50 PM CDT   [facility] Sp[facility] with Sarah Chakraborty, PT   [facility] [place] [facility] ([facility] [place]    [address]  [place] [address]   [phone]            May 17, 2017  4:00 PM CDT   Return Visit with Prudence Almonte DO   [facility] ([facility])    [address] [address] [address]   [phone]              Who to contact     If you have questions or need follow up information about today's clinic visit or your schedule please contact [facility] [place] directly at [phone].  Normal or non-critical lab and imaging results will be communicated to you by [employer], letter or phone within 4 business days after the clinic has received the results. If you do not hear from us within 7 days, please contact the clinic through [employer] or phone. If you have a critical or abnormal lab result, we will notify you by phone as soon as possible.  Submit refill requests through [employer] or call your pharmacy and they will forward the refill request to us. Please allow 3 business days for your refill to be completed.          Additional Information About Your Visit        [employer] Information     [employer] gives you secure access to your electronic health record. If you see a " "primary care provider, you can also send messages to your care team and make appointments. If you have questions, please call your primary care clinic.  If you do not have a primary care provider, please call [phone] and they will assist you.        Care EveryWhere ID     This is your Care EveryWhere ID. This could be used by other organizations to access your [place] medical records  [id number]        Your Vitals Were     Pulse Temperature Height Pulse Oximetry BMI (Body Mass Index)       66 97.2  F (36.2  C) (Tympanic) 5' 11\" (1.803 m) 99% 29.11 kg/m2        Blood Pressure from Last 3 Encounters:   04/27/17 108/70   04/17/17 124/81   04/05/17 118/72    Weight from Last 3 Encounters:   04/27/17 208 lb 11.2 oz (94.7 kg)   04/17/17 206 lb (93.4 kg)   01/04/17 206 lb (93.4 kg)              Today, you had the following     No orders found for display       Primary Care Provider Office Phone # Fax #    Prisiclla Murphy [phone]       [address] [place] [address]        Thank you!     Thank you for choosing [facility]  for your care. Our goal is always to provide you with excellent care. Hearing back from our patients is one way we can continue to improve our services. Please take a few minutes to complete the written survey that you may receive in the mail after your visit with us. Thank you!             Your Updated Medication List - Protect others around you: Learn how to safely use, store and throw away your medicines at www.disposemymeds.org.          This list is accurate as of: 4/27/17  3:19 PM.  Always use your most recent med list.                   Brand Name Dispense Instructions for use    atorvastatin 40 MG tablet    LIPITOR    90 tablet    Take 1 tablet (40 mg) by mouth daily       Buprenorphine 15 MCG/HR Ptwk    BUTRANS    6 patch    Place 15 mcg/hr onto the skin once a week Change patch every 5 days. 30 day supply. OK to " dispense on/after 4/22/17 to start 4/24/17       buprenorphine HCl-naloxone HCl 8-2 MG per film    SUBOXONE    120 Film    Place 1 Film under the tongue every 6 hours       clonazePAM 1 MG tablet    klonoPIN    120 tablet    Take 1 tablet (1 mg) by mouth 4 times daily       ESCITALOPRAM OXALATE PO      Take 20 mg by mouth daily       esomeprazole 20 MG CR capsule    nexIUM    30 capsule    Take 1 capsule (20 mg) by mouth every morning (before breakfast) Take 30-60 minutes before eating.       methocarbamol 500 MG tablet    ROBAXIN    240 tablet    Take 500 mg by mouth 2 times daily Reported on 4/27/2017       omega 3 1000 MG Caps     90 capsule    Take 1 g by mouth daily

## 2017-04-27 NOTE — TELEPHONE ENCOUNTER
Codrelia,    Please call pain clinic regarding Priscilla Son NP's request. Thanks.    Parish, RN  Triage Nurse

## 2017-04-27 NOTE — TELEPHONE ENCOUNTER
Called [facility] and spoke with  (Mitzi) and she sent a message to Dr Almonte to contact Priscilla Son regarding this patient. Dr Almonte is back in the clinic tomorrow.

## 2017-04-27 NOTE — PROGRESS NOTES
"  SUBJECTIVE:                                                    Diego Meng is a 41 year old male who presents to clinic today for the following health issues:    Patient here for follow up of chronic pain - neck & back pain follow up, last seen by pain management 4/17/17, last seen by Priscilla Son 11/25/16.    Chronic long term pain in cervical spine and lumbar spine. Now also has knee pain.   Sees pain clinic, Dr. Almonte.  Currently on Butrans, which he doesn't feel is working well.  States he called the pain clinic and they told him to come here for flares. What is bothering him the most right now is the right shoulder pain.    ROS: const/msk/neuro otherwise negative     OBJECTIVE:  /70 (Cuff Size: Adult Large)  Pulse 66  Temp 97.2  F (36.2  C) (Tympanic)  Ht 5' 11\" (1.803 m)  Wt 208 lb 11.2 oz (94.7 kg)  SpO2 99%  BMI 29.11 kg/m2  CONSTITUTIONAL: Alert, well-nourished, well-groomed, NAD  PSYCH: Bright affect. Appropriate mentation and speech.       ASSESSMENT/PLAN:  (G89.29) Other chronic pain  (primary encounter diagnosis)  Comment: Patient here for pain management. Patient has a history of chronic pain and is on Butrans, followed by the pain clinic. He states he is having a \"flare\" of all his pain, particularly the right shoulder. This flare has been ongoing for about 6 weeks. Discussed with Dr. Almonte from the pain clinic, who states she has spoken with him about this recently. States they have a plan to go up on the Butrans. States she recommended ortho referral, PT, and also she ordered a medrol dose pack.   Plan: Had RN call the patient and tell him this information.  Unable to prescribe opioids today given the fact that he is managed by the [facility].   Tried to listen and validate his frustration with his long term pain at the visit.   Recommended he take MBSR    25 minutes spent with patient, over 1/2 in counseling and coordination of care.   Priscilla Son, FNP-DNP.        "

## 2017-04-27 NOTE — TELEPHONE ENCOUNTER
Called pt.  Discussed Dr. Almonte's message and recommendations:    1.  He has an appt scheduled for PT next week   2.  Let him know that Dr. Almonte recommended that he return to orthopedics for eval of increased shoulder pain.  He will do this.   3.  Discussed BAY and oral steroids.  Although he will not totally rule out another injection, he would like to try a course of oral steroids first to see if this settles down the increased pain.    4.  Discussed the possibility of increasing the dose of butrans after he is on the 15 mcg dose for another week or so. Pt started patch on 4/22/17. Pt stated understanding.     Scheduled pt for a return visit on 5/17. Let him know that Dr. Almonte is not in the office today but will be back tomorrow.     Will inform Dr. Almonte of pt's interest in trying an oral steroid. Rx can be sent to [facility] in [place].  Will call pt back with medication information.     COLLEEN Moody, RN-BC  Patient Care Supervisor/Care Coordinator  [facility]

## 2017-04-27 NOTE — NURSING NOTE
"Chief Complaint   Patient presents with     RECHECK     neck & back pain follow up       Initial /70 (Cuff Size: Adult Large)  Pulse 66  Temp 97.2  F (36.2  C) (Tympanic)  Ht 5' 11\" (1.803 m)  Wt 208 lb 11.2 oz (94.7 kg)  SpO2 99%  BMI 29.11 kg/m2 Estimated body mass index is 29.11 kg/(m^2) as calculated from the following:    Height as of this encounter: 5' 11\" (1.803 m).    Weight as of this encounter: 208 lb 11.2 oz (94.7 kg).  Medication Reconciliation: complete   Cally Dubon CMA    "

## 2017-04-28 ENCOUNTER — TELEPHONE (OUTPATIENT)
Dept: PEDIATRICS | Facility: CLINIC | Age: 42
End: 2017-04-28

## 2017-04-28 RX ORDER — METHYLPREDNISOLONE 4 MG
TABLET, DOSE PACK ORAL
Qty: 21 TABLET | Refills: 0 | Status: SHIPPED | OUTPATIENT
Start: 2017-04-28 | End: 2017-05-15

## 2017-04-28 ASSESSMENT — ANXIETY QUESTIONNAIRES: GAD7 TOTAL SCORE: 13

## 2017-04-28 NOTE — TELEPHONE ENCOUNTER
Please let patient know I spoke with Dr. Almonte.    She recommended he could try medrol dose pack (steroids) if he wanted to. She already sent it in.  She doesn't think opioids are indicated in this case.  She recommended he see ortho about his shoulder and consider PT for his shoulder as it has helped in the past.    Please tell him I'm sorry that I can't prescribe additional opioids at this time.

## 2017-04-28 NOTE — TELEPHONE ENCOUNTER
Called patient to advise. Closing    Trena Washburn  BSN-RN Care Coordinator  [facility]

## 2017-04-28 NOTE — TELEPHONE ENCOUNTER
Called pt at [phone] & notified as below. Pt agrees with the plan. He already made appointments for PT, will start medrol dosepak. No other questions/concerns.    Parish, RN  Triage Nurse

## 2017-05-02 NOTE — TELEPHONE ENCOUNTER
PA approved.  Effective date: 4/17/2017 to 10/17/2017  PA reference #: [id number]  Letter sent by insurance company and letter forwarded to [employer].

## 2017-05-08 ENCOUNTER — TELEPHONE (OUTPATIENT)
Dept: PALLIATIVE MEDICINE | Facility: CLINIC | Age: 42
End: 2017-05-08

## 2017-05-08 DIAGNOSIS — G89.4 CHRONIC PAIN SYNDROME: Primary | ICD-10-CM

## 2017-05-08 RX ORDER — BUPRENORPHINE 20 UG/H
1 PATCH TRANSDERMAL
Qty: 4 PATCH | Refills: 0 | Status: SHIPPED | OUTPATIENT
Start: 2017-05-08 | End: 2017-06-13 | Stop reason: ALTCHOICE

## 2017-05-08 NOTE — TELEPHONE ENCOUNTER
Rx for Butrans Patch faxed to:      [id number] - [place], [place] - [address] AT [place] [address]  [address] [facility] [address]  Phone: [phone] Fax: [phone]    Fax confirmation received. Hardcopy destroyed.     Joana Gutierrez CMA   [facility]

## 2017-05-08 NOTE — TELEPHONE ENCOUNTER
Patient has no-showed to PT.     He continues to be medication-focused at this point despite the medications not providing relief.     Regarding his knee pain, he needs to see his orthopedist for this and his shoulder pain if that has continued.     It would be ok to try the increased Butrans.     Prudence Almonte DO  [facility]

## 2017-05-08 NOTE — TELEPHONE ENCOUNTER
Pt wanted to give Dr. Almonte an update on how methylPREDNISolone (MEDROL DOSEPAK) 4 MG tablet. Pt stated he felt no relief after taking this med and would know how he should proceed. Pt asking to speak with someone directly in regards to this matter.

## 2017-05-08 NOTE — TELEPHONE ENCOUNTER
"Spoke with Diego - he states no improvement at all from medrol dose pack, pain actually feels worse than original baseline pain. Has been worse past several weeks since meniscal repair a few months ago. Walking differently to protect knee and affecting back pain.    Reports no change in characteristics of pain, just a creep up of the intensity. No new tingling/numbness/weakness, no different radiation of pain or bowel/bladder symptoms.     Telephone encounter of 4/24/17 mentions change in Butrans patch to 20mcg after a few weeks on the 15mcg patch. Diego reports no improvement in overall pain level with the Butrans 15mcg patch.     States \"he's open to whatever she thinks\". Pharmacy confirmed [facility], [address] [facility].    Joana Christianson, MSN, RN-BC  Care Coordinator  [facility]    "

## 2017-05-09 NOTE — TELEPHONE ENCOUNTER
Message for Diego - making sure he was aware that new strength of pain medication sent in for him, but Dr. Almonte wanted to be sure we are not only looking at his pain from a medication focus. He needs to follow up with his orthopedist for both the knee and shoulder issues, and that we saw he'd had a PT appointment that was cancelled, so be sure to follow up with that as well. He needs to use all the tools to help manage the problems.    Asked him to call with any questions,    Joana Christianson, MSN, RN-BC  Care Coordinator  [facility]

## 2017-05-15 ENCOUNTER — TELEPHONE (OUTPATIENT)
Dept: PALLIATIVE MEDICINE | Facility: CLINIC | Age: 42
End: 2017-05-15

## 2017-05-15 ENCOUNTER — TELEPHONE (OUTPATIENT)
Dept: PEDIATRICS | Facility: CLINIC | Age: 42
End: 2017-05-15

## 2017-05-15 ENCOUNTER — OFFICE VISIT (OUTPATIENT)
Dept: PEDIATRICS | Facility: CLINIC | Age: 42
End: 2017-05-15
Payer: COMMERCIAL

## 2017-05-15 VITALS
OXYGEN SATURATION: 97 % | SYSTOLIC BLOOD PRESSURE: 120 MMHG | DIASTOLIC BLOOD PRESSURE: 70 MMHG | WEIGHT: 206 LBS | TEMPERATURE: 98.3 F | HEART RATE: 75 BPM | BODY MASS INDEX: 28.73 KG/M2

## 2017-05-15 DIAGNOSIS — M54.50 ACUTE BILATERAL LOW BACK PAIN WITHOUT SCIATICA: Primary | ICD-10-CM

## 2017-05-15 PROCEDURE — 99213 OFFICE O/P EST LOW 20 MIN: CPT | Performed by: PHYSICIAN ASSISTANT

## 2017-05-15 NOTE — TELEPHONE ENCOUNTER
"VM left today at: 12:27 pm    Patient reports increased problems with lumbar spine.   Also wants advice on \"acute episodes\" he has been having.      Ph. [phone]    Patricia IRELAND.    [facility]    "

## 2017-05-15 NOTE — PROGRESS NOTES
SUBJECTIVE:                                                    Diego Meng is a 41 year old male who presents to clinic today for the following health issues:    Back Pain      Duration: ongoing, flare up started x 3 days ago        Specific cause: gardening     Description:   Location of pain: low back bilateral  Character of pain: sharp  Pain radiation:radiates into the right buttocks and radiates into the left buttocks  New numbness or weakness in legs, not attributed to pain:  YES    Intensity: Currently 8/10    History:   Pain interferes with job: YES  History of back problems: previous spinal surgery - l-4,l-5  Any previous MRI or X-rays: Yes- at [facility].  Date most recent 4/24/2017  Sees a specialist for back pain:  Yes, Dr. Almonte at pain clinic, appointment made for 3 days from now  Therapies tried without relief: none    Alleviating factors:   Improved by: none      Precipitating factors:  Worsened by: Sitting     Accompanying Signs & Symptoms:  Risk of Fracture:  None  Risk of Cauda Equina:  None  Risk of Infection:  None  Risk of Cancer:  None  Risk of Ankylosing Spondylitis:  Onset at age <35, male, AND morning back stiffness. no        Patient managed by Dr. Almonte at the  pain clinic    ROS:  ROS otherwise negative    OBJECTIVE:                                                    /70 (BP Location: Right arm, Patient Position: Chair, Cuff Size: Adult Regular)  Pulse 75  Temp 98.3  F (36.8  C) (Oral)  Wt 206 lb (93.4 kg)  SpO2 97%  BMI 28.73 kg/m2  Body mass index is 28.73 kg/(m^2).   GENERAL: alert, no distress    Diagnostic test results:  No results found for this or any previous visit (from the past 24 hour(s)).     ASSESSMENT/PLAN:                                                    (M54.5) Acute bilateral low back pain without sciatica  (primary encounter diagnosis)  Comment: exacerbation of LBP. I am not comfortable prescribing pain medications with his current regimen.  Discussed with PMD who also agrees with plan. Patient to follow up with pain management physician in three days.   Plan:     See Patient Instructions    Lisbet Thakkar PA-C  [facility]

## 2017-05-15 NOTE — NURSING NOTE
"Chief Complaint   Patient presents with     Back Pain       Initial /70 (BP Location: Right arm, Patient Position: Chair, Cuff Size: Adult Regular)  Pulse 75  Temp 98.3  F (36.8  C) (Oral)  Wt 206 lb (93.4 kg)  SpO2 97%  BMI 28.73 kg/m2 Estimated body mass index is 28.73 kg/(m^2) as calculated from the following:    Height as of 4/27/17: 5' 11\" (1.803 m).    Weight as of this encounter: 206 lb (93.4 kg).  Medication Reconciliation: complete.Asad SAENZ MA      "

## 2017-05-15 NOTE — MR AVS SNAPSHOT
After Visit Summary   5/15/2017    Diego Meng    MRN: [id number]           Patient Information     Date Of Birth          1975        Visit Information        Provider Department      5/15/2017 3:50 PM Lisbet Thakkar PA-C [facility]        Today's Diagnoses     Acute bilateral low back pain without sciatica    -  1       Follow-ups after your visit        Your next 10 appointments already scheduled     May 17, 2017  4:00 PM CDT   Return Visit with Prudence Almonte DO   [facility] ([facility])    [address]  [address]  [facility] [address]   [phone]              Who to contact     If you have questions or need follow up information about today's clinic visit or your schedule please contact [facility] directly at [phone].  Normal or non-critical lab and imaging results will be communicated to you by MyChart, letter or phone within 4 business days after the clinic has received the results. If you do not hear from us within 7 days, please contact the clinic through [facility] or phone. If you have a critical or abnormal lab result, we will notify you by phone as soon as possible.  Submit refill requests through [facility] or call your pharmacy and they will forward the refill request to us. Please allow 3 business days for your refill to be completed.          Additional Information About Your Visit        MyChart Information     [facility] gives you secure access to your electronic health record. If you see a primary care provider, you can also send messages to your care team and make appointments. If you have questions, please call your primary care clinic.  If you do not have a primary care provider, please call [phone] and they will assist you.        Care EveryWhere ID     This is your Care EveryWhere ID. This could be used by other organizations to access your [facility]  records  [id number]        Your Vitals Were     Pulse Temperature Pulse Oximetry BMI (Body Mass Index)          75 98.3  F (36.8  C) (Oral) 97% 28.73 kg/m2         Blood Pressure from Last 3 Encounters:   05/15/17 120/70   04/27/17 108/70   04/17/17 124/81    Weight from Last 3 Encounters:   05/15/17 206 lb (93.4 kg)   04/27/17 208 lb 11.2 oz (94.7 kg)   04/17/17 206 lb (93.4 kg)              Today, you had the following     No orders found for display         Today's Medication Changes          These changes are accurate as of: 5/15/17 11:59 PM.  If you have any questions, ask your nurse or doctor.               These medicines have changed or have updated prescriptions.        Dose/Directions    buprenorphine 20 MCG/HR WK patch   Commonly known as:  BUTRANS   This may have changed:  Another medication with the same name was removed. Continue taking this medication, and follow the directions you see here.   Used for:  Chronic pain syndrome   Changed by:  Prudence Almonte,         Dose:  1 patch   Place 1 patch onto the skin every 7 days   Quantity:  4 patch   Refills:  0                Primary Care Provider Office Phone # Fax #    Priscilla Murphy [phone]       [address] [place] [address]        Thank you!     Thank you for choosing [facility]  for your care. Our goal is always to provide you with excellent care. Hearing back from our patients is one way we can continue to improve our services. Please take a few minutes to complete the written survey that you may receive in the mail after your visit with us. Thank you!             Your Updated Medication List - Protect others around you: Learn how to safely use, store and throw away your medicines at www.disposemymeds.org.          This list is accurate as of: 5/15/17 11:59 PM.  Always use your most recent med list.                   Brand Name Dispense Instructions for use     atorvastatin 40 MG tablet    LIPITOR    90 tablet    Take 1 tablet (40 mg) by mouth daily       buprenorphine 20 MCG/HR WK patch    BUTRANS    4 patch    Place 1 patch onto the skin every 7 days       buprenorphine HCl-naloxone HCl 8-2 MG per film    SUBOXONE    120 Film    Place 1 Film under the tongue every 6 hours       clonazePAM 1 MG tablet    klonoPIN    120 tablet    Take 1 tablet (1 mg) by mouth 4 times daily       ESCITALOPRAM OXALATE PO      Take 20 mg by mouth daily       esomeprazole 20 MG CR capsule    nexIUM    30 capsule    Take 1 capsule (20 mg) by mouth every morning (before breakfast) Take 30-60 minutes before eating.       omega 3 1000 MG Caps     90 capsule    Take 1 g by mouth daily

## 2017-05-16 NOTE — TELEPHONE ENCOUNTER
Called patient. Chart check reveals he has 2 appointments for tomorrow. LM for him to call scheduling to cancel one or the other.    Trena Washburn  BSN-RN Care Coordinator  [facility]

## 2017-05-16 NOTE — TELEPHONE ENCOUNTER
Pt returning RN phone call. He cancelled the appointment for 4:00 on 5/17. He will be here for the 8:30AM appointment on 5/17.    Samina Cuellar    [facility]

## 2017-05-17 ENCOUNTER — OFFICE VISIT (OUTPATIENT)
Dept: PALLIATIVE MEDICINE | Facility: CLINIC | Age: 42
End: 2017-05-17
Payer: COMMERCIAL

## 2017-05-17 VITALS
DIASTOLIC BLOOD PRESSURE: 64 MMHG | HEART RATE: 84 BPM | WEIGHT: 206 LBS | OXYGEN SATURATION: 96 % | SYSTOLIC BLOOD PRESSURE: 104 MMHG | BODY MASS INDEX: 28.73 KG/M2

## 2017-05-17 DIAGNOSIS — M62.830 BACK MUSCLE SPASM: ICD-10-CM

## 2017-05-17 DIAGNOSIS — M51.369 DDD (DEGENERATIVE DISC DISEASE), LUMBAR: Primary | ICD-10-CM

## 2017-05-17 DIAGNOSIS — F41.1 GAD (GENERALIZED ANXIETY DISORDER): ICD-10-CM

## 2017-05-17 DIAGNOSIS — F41.0 PANIC ATTACK: ICD-10-CM

## 2017-05-17 PROCEDURE — 99214 OFFICE O/P EST MOD 30 MIN: CPT | Performed by: PHYSICAL MEDICINE & REHABILITATION

## 2017-05-17 RX ORDER — KETOROLAC TROMETHAMINE 10 MG/1
10 TABLET, FILM COATED ORAL EVERY 6 HOURS PRN
Qty: 20 TABLET | Refills: 1 | Status: SHIPPED | OUTPATIENT
Start: 2017-05-17 | End: 2017-06-30

## 2017-05-17 RX ORDER — METHOCARBAMOL 500 MG/1
500-1000 TABLET, FILM COATED ORAL 4 TIMES DAILY PRN
Qty: 240 TABLET | Refills: 3 | Status: SHIPPED | OUTPATIENT
Start: 2017-05-17 | End: 2018-01-05

## 2017-05-17 RX ORDER — CLONAZEPAM 1 MG/1
1 TABLET ORAL 4 TIMES DAILY
Qty: 120 TABLET | Refills: 0 | Status: SHIPPED | OUTPATIENT
Start: 2017-05-20 | End: 2017-06-30

## 2017-05-17 ASSESSMENT — PAIN SCALES - GENERAL: PAINLEVEL: EXTREME PAIN (8)

## 2017-05-17 NOTE — TELEPHONE ENCOUNTER
Printed, signed, in my outbox.    Priscilla Murphy MD  Internal Medicine/Pediatrics  [facility]

## 2017-05-17 NOTE — PROGRESS NOTES
"                          [facility]    Date of visit: 5/17/2017    Chief complaint:   Chief Complaint   Patient presents with     Pain     low back pain     Interval history:  Diego Meng is a 41 year old male last seen by me on 4/17/17.    Since his last visit, Diego Meng reports:  -Will be seeing Dr. Kaufman tomorrow for his left knee.  -Lumbar pain is the worst area, left side, radiates to the posterolateral hip. This pain flared up over the past week and is described as sharp and continuous and is rated 7-9/10. The pain is aggravated by sitting and standing. His low back pain also is radiating down the right posterior thigh and leg to the heel. The pain is much worse in his back than in his leg. He had a lumbar epidural steroid injection after the surgery on 12/24/15, which flared up his pain. The right leg gives way at times. Denies any numbness but is having some tingling. Was told that a lumbar fusion is the next step in 2015 when he last saw Dr. Rios. He is open to a new surgical referral at this point.   -He is not noticing much relief with the butrans patch 20 mcg.   -Suboxone was approved and he would like to switch to suboxone.  -He has not scheduled with Dr. Benitez yet. We had a lengthy discussion about how anxiety and the \"noise in his head\" can be contributing to his pain cycle. He reports that his anxiety has settled down since getting a new job 3 months ago.  -His headaches are unchanged. They start at the back of his head/neck and radiate through his head to his eyes. We reviewed his cervical MRI and he is interested in proceeding with medial branch blocks. He has had medial branch blocks for his low back pain in the past, which did not provide significant relief. He attributes his headaches to rebound and has a very difficult time discontinuing excedrin.     Pain scores:  Pain intensity on average is 8 (up from 5) on a scale of 0-10.     Current pain " treatments:   butrans 20 mcg  excedrin #2-6/day for 20 years  lexapro 20 mg daily  Clonazepam 1 mg #1-2/day    Side Effects: no side effect    THE 4 A's OF OPIOID MAINTENANCE ANALGESIA    Analgesia:minimal    Activity: less    Adverse effects: none    Adherence to Rx protocol: good      Past pain treatments:  Diego GALDAMEZ Yusra has been seen at a pain clinic in the past. [facility] Dr. Vázquez and [facility]  Medications:     Medrol dose carolin -no relief              botox -no relief              Anti-migraine: fioricet, migranal, eletriptan, frovatriptan, midrin, naraptriptan, sumatriptan, zolmitripatn -no relief from any of these              Anti-convulsants: depakote, topiramate, gabapentin, lyrica -all of these made him feel fuzzy and forgetful and they were not helpful              Opioids: oxycontin, morphine -no relief, hydrocodone -no relief, tramadol -no relief                Muscle relaxants: soma -no relief, flexeril -no relief and made him tired, norflex -no relief, tizanidine -no relief and made him tired              NSAIDs: allergic to this class of medications -rash, abdominal pain and cramping              Anti-depressants: cymbalta -no relief                Topicals: lidocaine -no relief  PT: [facility] in 2016 -helped for shoulder but flared up back and neck  Psychology: yes for pain at the [facility] -'continues to use these skills'  Acupuncture: tried it -no relief  Chiropractic care: tried it -helped initially but then flared up pain  TENS Unit: no relief  Injections: R L4 and L5 TF BAY 12/24/15 -flared pain, cervical epidural steroid injection -provided 2-4 weeks of relief but had significant flare up for 2 weeks post-procedure, cervical trigger point injections -minimal relief, lumbar medial branch block -no relief, lumbar paraspinal trigger point injection 5/17/17  Surgery: right L4-L5 hemilaminectomy May 2015   [facility]    Medications:  Current Outpatient Prescriptions   Medication Sig Dispense Refill     buprenorphine (BUTRANS) 20 MCG/HR WK patch Place 1 patch onto the skin every 7 days 4 patch 0     clonazePAM (KLONOPIN) 1 MG tablet Take 1 tablet (1 mg) by mouth 4 times daily 120 tablet 0     atorvastatin (LIPITOR) 40 MG tablet Take 1 tablet (40 mg) by mouth daily 90 tablet 1     esomeprazole (NEXIUM) 20 MG capsule Take 1 capsule (20 mg) by mouth every morning (before breakfast) Take 30-60 minutes before eating. 30 capsule 0     omega 3 1000 MG CAPS Take 1 g by mouth daily 90 capsule      ESCITALOPRAM OXALATE PO Take 20 mg by mouth daily       buprenorphine HCl-naloxone HCl (SUBOXONE) 8-2 MG per film Place 1 Film under the tongue every 6 hours (Patient not taking: Reported on 5/17/2017) 120 Film 0       Medical History: any changes in medical history since they were last seen? no    Review of Systems:  The 14 system ROS was reviewed from the intake questionnaire, and is positive for: headache  Any bowel or bladder problems: none  Mood: anxiety, stress    Physical Exam:  Blood pressure 104/64, pulse 84, weight 93.4 kg (206 lb), SpO2 96 %.  General: no acute distress, pleasant, sitting comfortably but leaning to the left  Gait: antalgic  MSK: full strength but with some guarding due to pain, full sensation, left-sided lumbar paraspinal muscle spasms, reflexes symmetric    Imaging:  Lumbar MRI completed on 4/24/17:  FINDINGS: Five lumbar vertebrae are assumed. Degenerative loss of disc  signal with relatively normal disc height is noted at L4-L5 and L5-S1.  Minimal discogenic endplate reactive marrow edema is noted posteriorly  at L4-L5 as well. Apophyseal joints are essentially within normal  limits. Vertebral body heights and sagittal alignment are within  normal limits. The conus medullaris is unremarkable in appearance on  the sagittal images.      T12-L1, L1-L2, L2-L3, L3-L4: Normal.     L4-L5: Mild annular bulging  and endplate spurring with mild foraminal  narrowing below the exiting nerve roots. Posterior high signal annular  fissuring with minimal central disc protrusion. No central stenosis.     L5-S1: Small 0.3 cm caudally dissecting disc extrusion to the left of  midline, similar in appearance to 3/10/2015. This does not indent the  thecal sac or compress/displace the left S1 nerve root. No abnormal  enhancement. No central or foraminal stenosis.         IMPRESSION:  1. L5-S1 small caudally dissecting 0.3 cm disc extrusion slightly to  the left of midline without apparent neural impingement or stenosis.  2. L4-L5 small central disc protrusion, without apparent neural  impingement or central stenosis, and mild bilateral foraminal  stenosis.    Lumbar MRI completed on 8/1/15 showed:  CONCLUSION:    1. Interval right hemilaminotomy and partial facetectomy at L4-L5. No recurrent disc herniation.  2. Other minor degenerative changes. Annular fissures L4-L5 and L5-S1. No high-grade central canal or foraminal stenosis at any level.    Cervical spine MRI completed on 11/14/14:  FINDINGS: Cervical spine visualized through the upper thoracic spine. Lordosis normal. Slight retrolisthesis at each level between C3 and C6. Vertebral body heights are normal. Marrow signal is normal, including the facets. Paraspinous soft tissues are unremarkable. Visible posterior fossa contents are unremarkable. Cord signal and morphology is normal.  CRANIOCERVICAL JUNCTION: Mild anterior spurring at C1-C2. Foramen magnum and central canal are adequate.  C2-C3: Disc height normal. Mild facet hypertrophy. No central canal or foraminal stenosis.  C3-C4: Disc height normal. Slight right uncinate hypertrophy. Minimal facet hypertrophy. No significant central canal or foraminal stenosis.    C4-C5: Slight disc height loss. Mild bilateral facet hypertrophy. No central canal or foraminal stenosis.  C5-C6: Mild disc height loss with left eccentric disc  osteophyte complex. Mild bilateral facet hypertrophy. No central canal or right foraminal stenosis. Moderate left foraminal stenosis.    C6-C7: Slight disc height loss. Mild facet hypertrophy. No central canal or foraminal stenosis.  C7-T1: Mild disc height loss. Mild to moderate bilateral facet hypertrophy. No central canal or foraminal stenosis.  CONCLUSION:  1. Moderate left foraminal stenosis at C5-C6.  2. Other relatively minor degenerative changes, no further high-grade central canal or foraminal stenosis.     Right shoulder x-ray completed on 4/2/15:  FINDINGS: No evidence of fracture or dislocation. Normal glenohumeral joint alignment without evidence of degenerative changes. No evidence of significant acromioclavicular joint degenerative joint disease. No acromial or coracoid enthesophyte. Mild undersurface acromial sclerosis. Downslope type three acromion. No blastic or lytic lesions.  IMPRESSION:  Right shoulder type three acromion otherwise within normal limits.     [place] Board of Pharmacy Data Base Reviewed: YES; as expected, no concern for abuse or misuse    Assessment:   1. Chronic pain syndrome  2. Opioid dependency  3. Chronic low back pain. Right L4-L5 hemilaminectomy partial facetectomy in May 2015 with Dr. Rios. Now with right sided radicular symptoms and left-sided lumbar spasms  4. Chronic neck pain. Cervical facet arthropathy.  5. Chronic headaches. Likely related to facet arthropathy, psychogenic, and rebound (excedrin)  6. Anxiety with panic attacks. Takes lexapro and clonazepam. Stable.  7. Tobacco use disorder  8. Chronic bilateral knee pain. Left knee meniscus repair on 11/28/16 with Dr. Kaufman.    9. Chronic right shoulder pain.  10. No self cares. Reliant on medication.   11. Working full time as an .     Plan:  1. Medication Management: UDS with expected and appropriate results and opioid agreement on 11/2/16  -Discontinue butrans.  -Start suboxone 8-2 mg every 6  hours max #4/day.  -Robaxin 500-1000 mg four times a day as needed.  -toradol IM today and continue toradol 10 mg every 6 hours as needed for pain flare.  2. Follow up: with Dr. Almonte in 1 month or with Dr. Reba Vallecillo  3. Referrals: neurosurgery  4. Follow up with Dr. Kaufman regarding knee pain -scheduled for tomorrow.  5. Schedule with Sarah for PT.  6. Lumbar paraspinal trigger point injection today.     Trigger Point Injection (1 Muscle Groups) Procedure Note:    Diagnosis: myofascial pain syndrome    Examination identifies trigger points at the following muscle(s): left lumbar paraspinals.     Options/alternatives, benefits and risks were discussed with the patient including bleeding, infection, tissue trauma and pnuemothorax.  Questions were answered to his satisfaction and he agrees to proceed. Voluntary informed consent was obtained and signed.     Using clean technique, injections were completed using a 25G, 1.5 inch needle.  After negative aspiration, injection was completed.  A total of 3 locations were injected.     Muscle groups injected:  Left-sided lumbar paraspinals    Injection solution contained:  2 ml of 1% lidocaine and 2 ml of 0.5% bupivacaine.    Hemostasis achieved. Band-aids were applied. he tolerated the procedure well.      Total time spent was 30 minutes, and more than 50% of face to face time was spent in counseling and/or coordination of care regarding the above assessment and plan.    Prudence Almonte DO  [facility]  [facility]

## 2017-05-17 NOTE — NURSING NOTE
The following medication was given:     MEDICATION: Toradol 30 mg  ROUTE: IM  SITE: Deltoid - Right  DOSE: 30mg - 1 ml  LOT #: [id number]  :  [employer]  EXPIRATION DATE:  08/2018  NDC: 41172-714-98.    Verbal order - CORRINE Almonte MD/ROBBIE Che, MSN, RN-BC  Care Coordinator  [facility] [place]

## 2017-05-17 NOTE — MR AVS SNAPSHOT
After Visit Summary   5/17/2017    Diego Meng    MRN: [id number]           Patient Information     Date Of Birth          1975        Visit Information        Provider Department      5/17/2017 8:30 AM Prudence Almonte DO [facility]        Today's Diagnoses     DDD (degenerative disc disease), lumbar    -  1      Care Instructions    1. Medication Management: UDS with expected and appropriate results and opioid agreement on 11/2/16  -Discontinue butrans.  -Start suboxone 8-2 mg every 6 hours max #4/day.  -Tordol injection today and then toradol 10 mg every 6 hours as needed for pain flare.  2. Follow up: as needed.  3. Referrals: neurosurgery  4. Follow up with Dr. Kaufman regarding knee pain -scheduled for tomorrow.  5. Schedule with Sarah for PT.    ----------------------------------------------------------------  Nurse Triage line:  [phone]   Call this number with any questions or concerns. You may leave a detailed message anytime. Calls are typically returned Monday through Friday between 8 AM and 4:30 PM. We usually get back to you within 2 business days depending on the issue/request.       Medication refills:    For non-narcotic medications, call your pharmacy directly to request a refill. The pharmacy will contact the [facility] Center for authorization. Please allow 3-4 days for these refills to be processed.     For narcotic refills, call the nurse triage line or send a Razor [employer]ights message. Please contact us 7-10 days before your refill is due. The message MUST include the name of the specific medication(s) requested and how you would like to receive the prescription(s). The options are as follows:    Pain Clinic staff can mail the prescription to your pharmacy. Please tell us the name of the pharmacy.    You may pick the prescription up at the Pain Clinic (tell us the location) or during a clinic visit with your pain provider    Pain Clinic staff can  deliver the prescription to the [facility] pharmacy in the clinic building. Please tell us the location.      Scheduling number: [phone].  Call this number to schedule or change appointments.    We believe regular attendance is key to your success in our program.    Any time you are unable to keep your appointment we ask that you call us at least 24 hours in advance to let us know. This will allow us to offer the appointment time to another patient.               Follow-ups after your visit        Additional Services     ORTHO  REFERRAL       [facility] is referring you to the Orthopedic  Services at [facility].       The  Representative will assist you in the coordination of your Orthopedic and Musculoskeletal Care as prescribed by your physician.    The  Representative will call you within 1 business day to help schedule your appointment, or you may contact the  Representative at:    All areas ~ [phone]     Type of Referral : Spine: Lumbar  **Choose Medical Spine Specialist (unless patient was seen by a Medical Spine Specialist within the past 6 months).**  Surgical Evaluation is advised if the patient presents with one or more of the following red flags: Evidence of Spinal Tumor, Infection or Fracture, Cauda Equina Syndrome, Sudden or Progressive Weakness, Loss of Bowel or Bladder Control, or any other documented emergent neurological condition resulting from a Lumbar Spinal Condition. Spine Surgeon        Timeframe requested: 3 - 5 days    Coverage of these services is subject to the terms and limitations of your health insurance plan.  Please call member services at your health plan with any benefit or coverage questions.      If X-rays, CT or MRI's have been performed, please contact the facility where they were done to arrange for , prior to your scheduled appointment.  Please bring this referral request to  your appointment and present it to your specialist.                  Your next 10 appointments already scheduled     May 18, 2017  8:30 AM CDT   Return Visit with Geovani Kaufman MD   [facility] ([facility])    [address] [address] [address]   [phone]              Who to contact     If you have questions or need follow up information about today's clinic visit or your schedule please contact [facility] directly at [phone].  Normal or non-critical lab and imaging results will be communicated to you by MediSafe Projecthart, letter or phone within 4 business days after the clinic has received the results. If you do not hear from us within 7 days, please contact the clinic through Conveneert or phone. If you have a critical or abnormal lab result, we will notify you by phone as soon as possible.  Submit refill requests through Provas[employer]on or call your pharmacy and they will forward the refill request to us. Please allow 3 business days for your refill to be completed.          Additional Information About Your Visit        Provasculon Information     Pro[employer]on gives you secure access to your electronic health record. If you see a primary care provider, you can also send messages to your care team and make appointments. If you have questions, please call your primary care clinic.  If you do not have a primary care provider, please call [phone] and they will assist you.        Care EveryWhere ID     This is your Care EveryWhere ID. This could be used by other organizations to access your [facility] medical records  [id number]        Your Vitals Were     Pulse Pulse Oximetry BMI (Body Mass Index)             84 96% 28.73 kg/m2          Blood Pressure from Last 3 Encounters:   05/17/17 104/64   05/15/17 120/70   04/27/17 108/70    Weight from Last 3 Encounters:   05/17/17 93.4 kg (206 lb)   05/15/17 93.4 kg (206 lb)   04/27/17 94.7 kg (208 lb  11.2 oz)              We Performed the Following     ORTHO  REFERRAL          Today's Medication Changes          These changes are accurate as of: 5/17/17  9:13 AM.  If you have any questions, ask your nurse or doctor.               Start taking these medicines.        Dose/Directions    ketorolac 10 MG tablet   Commonly known as:  TORADOL   Used for:  DDD (degenerative disc disease), lumbar   Started by:  Prudence Almonte DO        Dose:  10 mg   Take 1 tablet (10 mg) by mouth every 6 hours as needed for moderate pain   Quantity:  20 tablet   Refills:  1            Where to get your medicines      These medications were sent to [facility] [address] [place] [address] [address] AT [place] [address]  [id number] [place] [id number]    Hours:  24-hours Phone:  [phone]     ketorolac 10 MG tablet                Primary Care Provider Office Phone # Fax #    Priscilla Murphy [phone]       [facility] [address] [place] [address]        Thank you!     Thank you for choosing [facility]  for your care. Our goal is always to provide you with excellent care. Hearing back from our patients is one way we can continue to improve our services. Please take a few minutes to complete the written survey that you may receive in the mail after your visit with us. Thank you!             Your Updated Medication List - Protect others around you: Learn how to safely use, store and throw away your medicines at www.disposemymeds.org.          This list is accurate as of: 5/17/17  9:13 AM.  Always use your most recent med list.                   Brand Name Dispense Instructions for use    atorvastatin 40 MG tablet    LIPITOR    90 tablet    Take 1 tablet (40 mg) by mouth daily       buprenorphine 20 MCG/HR WK patch    BUTRANS    4 patch    Place 1 patch onto the skin every 7 days       buprenorphine HCl-naloxone HCl 8-2 MG  per film    SUBOXONE    120 Film    Place 1 Film under the tongue every 6 hours       clonazePAM 1 MG tablet    klonoPIN    120 tablet    Take 1 tablet (1 mg) by mouth 4 times daily       ESCITALOPRAM OXALATE PO      Take 20 mg by mouth daily       esomeprazole 20 MG CR capsule    nexIUM    30 capsule    Take 1 capsule (20 mg) by mouth every morning (before breakfast) Take 30-60 minutes before eating.       ketorolac 10 MG tablet    TORADOL    20 tablet    Take 1 tablet (10 mg) by mouth every 6 hours as needed for moderate pain       omega 3 1000 MG Caps     90 capsule    Take 1 g by mouth daily

## 2017-05-17 NOTE — PATIENT INSTRUCTIONS
1. Medication Management: UDS with expected and appropriate results and opioid agreement on 11/2/16  -Discontinue butrans.  -Start suboxone 8-2 mg every 6 hours max #4/day.  -Tordol injection today and then toradol 10 mg every 6 hours as needed for pain flare.  2. Follow up: as needed.  3. Referrals: neurosurgery  4. Follow up with Dr. Kaufman regarding knee pain -scheduled for tomorrow.  5. Schedule with Sarah for PT.    ----------------------------------------------------------------  Nurse Triage line:  [phone]   Call this number with any questions or concerns. You may leave a detailed message anytime. Calls are typically returned Monday through Friday between 8 AM and 4:30 PM. We usually get back to you within 2 business days depending on the issue/request.       Medication refills:    For non-narcotic medications, call your pharmacy directly to request a refill. The pharmacy will contact the [facility] Management Center for authorization. Please allow 3-4 days for these refills to be processed.     For narcotic refills, call the nurse triage line or send a Repligen message. Please contact us 7-10 days before your refill is due. The message MUST include the name of the specific medication(s) requested and how you would like to receive the prescription(s). The options are as follows:    Pain Clinic staff can mail the prescription to your pharmacy. Please tell us the name of the pharmacy.    You may pick the prescription up at the Pain Clinic (tell us the location) or during a clinic visit with your pain provider    Pain Clinic staff can deliver the prescription to the [facility] pharmacy in the clinic building. Please tell us the location.      Scheduling number: [phone].  Call this number to schedule or change appointments.    We believe regular attendance is key to your success in our program.    Any time you are unable to keep your appointment we ask that you call us at least 24 hours in advance to let us  know. This will allow us to offer the appointment time to another patient.

## 2017-05-17 NOTE — TELEPHONE ENCOUNTER
Clonazepam 1mg      Last Written Prescription Date:  04/20/2017  Last Fill Quantity: 120,   # refills: 0  Last Office Visit with [facility], [facility] or [facility] prescribing provider: 04/27/2017  Future Office visit:    Next 5 appointments (look out 90 days)     May 18, 2017  8:30 AM CDT   Return Visit with Geovani Kaufman MD   [facility] ([facility])    [address] [place] [id number]   [phone]                   Routing refill request to provider for review/approval because:  Drug not on the [facility], [facility] or [facility] refill protocol or controlled substance      Susan Murphy CPhT  [facility]   Phone: [phone]  Fax: [phone]

## 2017-05-17 NOTE — NURSING NOTE
"Chief Complaint   Patient presents with     Pain     low back pain       Initial /64  Pulse 84  Wt 93.4 kg (206 lb)  SpO2 96%  BMI 28.73 kg/m2 Estimated body mass index is 28.73 kg/(m^2) as calculated from the following:    Height as of 4/27/17: 1.803 m (5' 11\").    Weight as of this encounter: 93.4 kg (206 lb).  [facility]    "

## 2017-05-25 ENCOUNTER — OFFICE VISIT (OUTPATIENT)
Dept: ORTHOPEDICS | Facility: CLINIC | Age: 42
End: 2017-05-25
Payer: COMMERCIAL

## 2017-05-25 ENCOUNTER — TELEPHONE (OUTPATIENT)
Dept: PALLIATIVE MEDICINE | Facility: CLINIC | Age: 42
End: 2017-05-25

## 2017-05-25 VITALS — HEIGHT: 71 IN | BODY MASS INDEX: 28.84 KG/M2 | WEIGHT: 206 LBS | RESPIRATION RATE: 14 BRPM

## 2017-05-25 DIAGNOSIS — M17.12 PRIMARY OSTEOARTHRITIS OF LEFT KNEE: Primary | ICD-10-CM

## 2017-05-25 PROCEDURE — 20610 DRAIN/INJ JOINT/BURSA W/O US: CPT | Mod: LT | Performed by: ORTHOPAEDIC SURGERY

## 2017-05-25 PROCEDURE — 99213 OFFICE O/P EST LOW 20 MIN: CPT | Mod: 25 | Performed by: ORTHOPAEDIC SURGERY

## 2017-05-25 RX ORDER — METHYLPREDNISOLONE ACETATE 40 MG/ML
40 INJECTION, SUSPENSION INTRA-ARTICULAR; INTRALESIONAL; INTRAMUSCULAR; SOFT TISSUE ONCE
Qty: 1 ML | Refills: 0 | Status: CANCELLED | OUTPATIENT
Start: 2017-05-25 | End: 2017-05-25

## 2017-05-25 NOTE — PROGRESS NOTES
"HISTORY OF PRESENT ILLNESS:    Diego Meng is a 41 year old male who is seen in follow up for left knee pain and swelling.  Had medial meniscectomy 11/28/16 with good relief of symptoms.  Present symptoms: pain started about one month ago, came on gradually, no known acute injury. Medial and lateral pain, feels unstable with weight bearing.  Feels pain from ankle to hip.  Does have significant swelling.  Treatments tried to this point:   Topical Treatments: Ice   Rx Meds: Suboxone and Robaxin (for chronic pain)    PHYSICAL EXAM:  Resp 14  Ht 5' 11\" (1.803 m)  Wt 206 lb (93.4 kg)  BMI 28.73 kg/m2  Body mass index is 28.73 kg/(m^2).   GENERAL APPEARANCE: healthy, alert and no distress   SKIN: no suspicious lesions or rashes  NEURO: Normal strength and tone, mentation intact and speech normal  VASCULAR:  good pulses, and cappillary refill   LYMPH: no lymphadenopathy   PSYCH:  mentation appears normal and affect normal/bright    MSK:  The patient ambulates without an antalgic gait.  The patient is able to get on and off the exam table without difficulty.      Examination of the spine reveals a normal lordosis to the cervical and lumbar spine, and a normal kyphosis to the thoracic spine.  There is no clinical evidence of scoliosis.    The pelvis is clinically level.  Trendelenberg is negative.  The patient is non-tender to palpation over the greater trochanteric bursal region or piriformis fossa.  With the hip flexed to 90 degrees, internal and external rotation is 30/60 respectively,  with no pain .      KNEE: Examination of the left knee reveals the lower extremity to have a Normal anatomic alignment.  There is no erythema, ecchymosis nor edema.  There is minimal effusion present clinically.  There is no significant warmth.  Range of motion is 0 to 120 degrees, without crepitus.  There is mild tenderness to palpation at the both medial and lateral joint line.  Kamar's is negative without crepitus. The " knee is ligamentously stable. Patello-femoral grind test is positive.      The calves and thighs are symmetric, without atrophy and non-tender to palpation. Francisca's sign is negative, bilaterally.   CMS is intact to the toes.       IMAGING INTERPRETATION:       ASSESSMENT / PLAN: Primary osteoarthritis left knee. I offered him a corticosteroid injection.  Procedure Note:  After risks and benefits were explained and questions answered, pt agreed to undergo a left knee injection. Using aseptic technique and a inferolateral parapatellar tendon approach, the joint space was infiltrated with 4 mg of Dexamethasone, 40 mg of Depo Medrol, and 3 cc of 1% Lidocaine.  There were no complications and the patient tolerated the procedure well.    Return to clinic p.r.noris.    Kalin Kaufman MD  Dept. Orthopedic Surgery  [facility]       Disclaimer: This note consists of symbols derived from keyboarding, dictation and/or voice recognition software. As a result, there may be errors in the script that have gone undetected. Please consider this when interpreting information found in this chart.

## 2017-05-25 NOTE — MR AVS SNAPSHOT
After Visit Summary   5/25/2017    Diego Meng    MRN: [id number]           Patient Information     Date Of Birth          1975        Visit Information        Provider Department      5/25/2017 8:30 AM Geovani Kaufman MD [facility]        Today's Diagnoses     Primary osteoarthritis of left knee    -  1       Follow-ups after your visit        Your next 10 appointments already scheduled     Jun 30, 2017  3:30 PM CDT   Return Visit with Reba Vallecillo MD   [facility] ([facility])    [address] [address]  [address] [place] [address]   [phone]              Who to contact     If you have questions or need follow up information about today's clinic visit or your schedule please contact [facility] directly at [phone].  Normal or non-critical lab and imaging results will be communicated to you by Batzu Mediahart, letter or phone within 4 business days after the clinic has received the results. If you do not hear from us within 7 days, please contact the clinic through Batzu Mediahart or phone. If you have a critical or abnormal lab result, we will notify you by phone as soon as possible.  Submit refill requests through L[employer] Agency or call your pharmacy and they will forward the refill request to us. Please allow 3 business days for your refill to be completed.          Additional Information About Your Visit        MyChart Information     [employer] gives you secure access to your electronic health record. If you see a primary care provider, you can also send messages to your care team and make appointments. If you have questions, please call your primary care clinic.  If you do not have a primary care provider, please call [phone] and they will assist you.        Care EveryWhere ID     This is your Care EveryWhere ID. This could be used by other organizations to access your [facility]  "records  [id number]        Your Vitals Were     Respirations Height BMI (Body Mass Index)             14 5' 11\" (1.803 m) 28.73 kg/m2          Blood Pressure from Last 3 Encounters:   06/16/17 (!) 88/48   06/07/17 112/74   05/17/17 104/64    Weight from Last 3 Encounters:   06/16/17 204 lb (92.5 kg)   05/25/17 206 lb (93.4 kg)   05/17/17 206 lb (93.4 kg)              We Performed the Following     DEXAMETHASONE SODIUM PHOS PER 1 MG     DRAIN/INJECT LARGE JOINT/BURSA     METHYLPREDNISOLONE 40 MG INJ          Today's Medication Changes          These changes are accurate as of: 5/25/17 11:59 PM.  If you have any questions, ask your nurse or doctor.               Start taking these medicines.        Dose/Directions    dexamethasone 4 MG/ML injection   Commonly known as:  DECADRON   Used for:  Primary osteoarthritis of left knee   Started by:  Geovani Kaufman MD        Dose:  4 mg   Inject 1 mL (4 mg) as directed once for 1 dose   Quantity:  1 mL   Refills:  0       lidocaine 1 % injection   Used for:  Primary osteoarthritis of left knee   Started by:  Geovani Kaufman MD        Dose:  3 mL   3 mLs by INTRA-ARTICULAR route once for 1 dose   Quantity:  3 mL   Refills:  0       methylPREDNISolone acetate 40 MG/ML injection   Commonly known as:  DEPO-MEDROL   Used for:  Primary osteoarthritis of left knee   Started by:  Geovani Kaufman MD        Dose:  40 mg   1 mL (40 mg) by INTRA-ARTICULAR route once for 1 dose   Quantity:  1 mL   Refills:  0            Where to get your medicines      Some of these will need a paper prescription and others can be bought over the counter.  Ask your nurse if you have questions.     You don't need a prescription for these medications     dexamethasone 4 MG/ML injection    lidocaine 1 % injection    methylPREDNISolone acetate 40 MG/ML injection                Primary Care Provider Office Phone # Fax #    Priscilla Murphy [phone]       " [facility] [address] [place] [address]        Equal Access to Services     JERMANMATHEW TIM : Hadii em johnson hadarcadioflavio Soashali, waaxda luqadaha, qaybta kaalmada rohan, jamel ferreirashashihumberto crouch. So [facility] [phone].    ATENCIÓN: Si habla español, tiene a yanez disposición servicios gratuitos de asistencia lingüística. Llame al [phone].    We comply with applicable federal civil rights laws and [place] laws. We do not discriminate on the basis of race, color, national origin, age, disability sex, sexual orientation or gender identity.            Thank you!     Thank you for choosing [facility]  for your care. Our goal is always to provide you with excellent care. Hearing back from our patients is one way we can continue to improve our services. Please take a few minutes to complete the written survey that you may receive in the mail after your visit with us. Thank you!             Your Updated Medication List - Protect others around you: Learn how to safely use, store and throw away your medicines at www.disposemymeds.org.          This list is accurate as of: 5/25/17 11:59 PM.  Always use your most recent med list.                   Brand Name Dispense Instructions for use Diagnosis    buprenorphine 20 MCG/HR WK patch    BUTRANS    4 patch    Place 1 patch onto the skin every 7 days    Chronic pain syndrome       clonazePAM 1 MG tablet    klonoPIN    120 tablet    Take 1 tablet (1 mg) by mouth 4 times daily    Panic attack, BRIDGER (generalized anxiety disorder)       dexamethasone 4 MG/ML injection    DECADRON    1 mL    Inject 1 mL (4 mg) as directed once for 1 dose    Primary osteoarthritis of left knee       ESCITALOPRAM OXALATE PO      Take 20 mg by mouth daily        esomeprazole 20 MG CR capsule    nexIUM    30 capsule    Take 1 capsule (20 mg) by mouth every morning (before breakfast) Take 30-60 minutes before eating.        ketorolac 10 MG  tablet    TORADOL    20 tablet    Take 1 tablet (10 mg) by mouth every 6 hours as needed for moderate pain    DDD (degenerative disc disease), lumbar       lidocaine 1 % injection     3 mL    3 mLs by INTRA-ARTICULAR route once for 1 dose    Primary osteoarthritis of left knee       methocarbamol 500 MG tablet    ROBAXIN    240 tablet    Take 1-2 tablets (500-1,000 mg) by mouth 4 times daily as needed for muscle spasms    Back muscle spasm       methylPREDNISolone acetate 40 MG/ML injection    DEPO-MEDROL    1 mL    1 mL (40 mg) by INTRA-ARTICULAR route once for 1 dose    Primary osteoarthritis of left knee       omega 3 1000 MG Caps     90 capsule    Take 1 g by mouth daily

## 2017-05-25 NOTE — TELEPHONE ENCOUNTER
Patient would like to speak to a nurse, he states that nothing is work for his pain.      Mitzi MONTOYA    [facility]

## 2017-05-25 NOTE — TELEPHONE ENCOUNTER
Plan from 5/17/17 office visit:    Plan:  1. Medication Management: UDS with expected and appropriate results and opioid agreement on 11/2/16  -Discontinue butrans.  -Start suboxone 8-2 mg every 6 hours max #4/day.  -Robaxin 500-1000 mg four times a day as needed.  -toradol IM today and continue toradol 10 mg every 6 hours as needed for pain flare.  2. Follow up: with Dr. Almonte in 1 month or with Dr. Reba Vallecillo  3. Referrals: neurosurgery  4. Follow up with Dr. Kaufman regarding knee pain -scheduled for tomorrow.  5. Schedule with Sarah for PT.  6. Lumbar paraspinal trigger point injection today.

## 2017-05-25 NOTE — NURSING NOTE
"Chief Complaint   Patient presents with     Musculoskeletal Problem     left knee pain and swelling       Initial Resp 14  Ht 5' 11\" (1.803 m)  Wt 206 lb (93.4 kg)  BMI 28.73 kg/m2 Estimated body mass index is 28.73 kg/(m^2) as calculated from the following:    Height as of this encounter: 5' 11\" (1.803 m).    Weight as of this encounter: 206 lb (93.4 kg).  Medication Reconciliation: complete     Kenny Peter MS, ATC      "

## 2017-05-25 NOTE — LETTER
"      5/25/2017         RE: Diego Meng  [address]  [facility] [address]        Dear Colleague,    Thank you for referring your patient, Diego Meng, to the [facility] ORTHOPEDIC SURGERY. Please see a copy of my visit note below.    HISTORY OF PRESENT ILLNESS:    Diego Meng is a 41 year old male who is seen in follow up for left knee pain and swelling.  Had medial meniscectomy 11/28/16 with good relief of symptoms.  Present symptoms: pain started about one month ago, came on gradually, no known acute injury. Medial and lateral pain, feels unstable with weight bearing.  Feels pain from ankle to hip.  Does have significant swelling.  Treatments tried to this point:   Topical Treatments: Ice   Rx Meds: Suboxone and Robaxin (for chronic pain)    PHYSICAL EXAM:  Resp 14  Ht 5' 11\" (1.803 m)  Wt 206 lb (93.4 kg)  BMI 28.73 kg/m2  Body mass index is 28.73 kg/(m^2).   GENERAL APPEARANCE: healthy, alert and no distress   SKIN: no suspicious lesions or rashes  NEURO: Normal strength and tone, mentation intact and speech normal  VASCULAR:  good pulses, and cappillary refill   LYMPH: no lymphadenopathy   PSYCH:  mentation appears normal and affect normal/bright    MSK:  The patient ambulates without an antalgic gait.  The patient is able to get on and off the exam table without difficulty.      Examination of the spine reveals a normal lordosis to the cervical and lumbar spine, and a normal kyphosis to the thoracic spine.  There is no clinical evidence of scoliosis.    The pelvis is clinically level.  Trendelenberg is negative.  The patient is non-tender to palpation over the greater trochanteric bursal region or piriformis fossa.  With the hip flexed to 90 degrees, internal and external rotation is 30/60 respectively,  with no pain .      KNEE: Examination of the left knee reveals the lower extremity to have a Normal anatomic alignment.  There is no erythema, ecchymosis nor " edema.  There is minimal effusion present clinically.  There is no significant warmth.  Range of motion is 0 to 120 degrees, without crepitus.  There is mild tenderness to palpation at the both medial and lateral joint line.  Kamar's is negative without crepitus. The knee is ligamentously stable. Patello-femoral grind test is positive.      The calves and thighs are symmetric, without atrophy and non-tender to palpation. Francisca's sign is negative, bilaterally.   CMS is intact to the toes.       IMAGING INTERPRETATION:       ASSESSMENT / PLAN: Primary osteoarthritis left knee. I offered him a corticosteroid injection.  Procedure Note:  After risks and benefits were explained and questions answered, pt agreed to undergo a left knee injection. Using aseptic technique and a inferolateral parapatellar tendon approach, the joint space was infiltrated with 4 mg of Dexamethasone, 40 mg of Depo Medrol, and 3 cc of 1% Lidocaine.  There were no complications and the patient tolerated the procedure well.    Return to clinic p.r.noris.    Kalin Kaufman MD  Dept. Orthopedic Surgery  [facility]       Disclaimer: This note consists of symbols derived from keyboarding, dictation and/or voice recognition software. As a result, there may be errors in the script that have gone undetected. Please consider this when interpreting information found in this chart.        Again, thank you for allowing me to participate in the care of your patient.        Sincerely,        Geovani Kaufman MD

## 2017-05-26 NOTE — TELEPHONE ENCOUNTER
Spoke with Diego. He reports Suboxone is helping but pain is so strong that he's having trouble functioning, particularly pain in hips. Reports pain level while on Butrans vs Suboxone - pain is down maybe 1-2 points on 0-10 pain scale, but does get up to even a 9/10 at times during the day.     Toradol - caused some stomach cramping and is not using it anymore. Is using the Robaxin as prescribed but no definitive improvement with that.     Saw Dr. Kaufman for knee, had cortisone injection, which already feels a little better.     No appointment set up with Dr. Benitez  He said he feels like his anxiety is better. I discussed the benefits of working with a psychologist that cares for people with chronic pain, that she would help him with more than anxiety. Discussed learning coping strategies, learning to work on other ways of managing pain other than physical or medicinal methods. Instead of thinking more medicine, you learn ways to help yourself. With chronic pain that is a crucial element of self-care. If there was a simple fix for what's hurting, it would have been figured out before getting to our clinic. It's the combination of approaches that he will have to deal long term with.    Discussed routine self-cares for over the long weekend. Alternating ice/heat to address both inflammation and spasm. Gentle stretching of the painful areas even though that seems counter-intuitive to want to stretch something that's painful. Discussed how it's human nature to want to protect an area that hurts, not stretch it, but muscles can be gently used even when sore and this can help improve overall function.     Routing to provider  - Diego is aware Dr. Almonte out of office until 5/30/17.    Joana Christianson, MSN, RN-BC  Care Coordinator  [facility]

## 2017-05-30 NOTE — TELEPHONE ENCOUNTER
Patient was scheduled for an office visit with me today, but did not show up. It appears that the suboxone is providing some relief. I do not see that he has scheduled an appointment with neurosurgery yet. He should also participate in PT. If the trigger point injection provided some relief, it would be ok to repeat that injection and I could add steroid.     Prudence Almonte, DO  [facility]

## 2017-06-06 ENCOUNTER — TELEPHONE (OUTPATIENT)
Dept: PALLIATIVE MEDICINE | Facility: CLINIC | Age: 42
End: 2017-06-06

## 2017-06-06 NOTE — TELEPHONE ENCOUNTER
Called patient to inform him Dr. Almonte will be leaving clinic and wanted him transferred to Dr Vallecillo  For a 60 min return appt.     Patricia CHA    [facility]

## 2017-06-07 ENCOUNTER — OFFICE VISIT (OUTPATIENT)
Dept: PALLIATIVE MEDICINE | Facility: CLINIC | Age: 42
End: 2017-06-07
Payer: COMMERCIAL

## 2017-06-07 VITALS — HEART RATE: 74 BPM | DIASTOLIC BLOOD PRESSURE: 74 MMHG | SYSTOLIC BLOOD PRESSURE: 112 MMHG | OXYGEN SATURATION: 98 %

## 2017-06-07 DIAGNOSIS — M62.830 BACK MUSCLE SPASM: ICD-10-CM

## 2017-06-07 DIAGNOSIS — M54.16 LUMBAR RADICULOPATHY: Primary | ICD-10-CM

## 2017-06-07 PROCEDURE — 99214 OFFICE O/P EST MOD 30 MIN: CPT | Performed by: PHYSICAL MEDICINE & REHABILITATION

## 2017-06-07 RX ORDER — OXYCODONE AND ACETAMINOPHEN 10; 325 MG/1; MG/1
1 TABLET ORAL EVERY 4 HOURS PRN
Qty: 60 TABLET | Refills: 0 | Status: SHIPPED | OUTPATIENT
Start: 2017-06-07 | End: 2017-06-13 | Stop reason: ALTCHOICE

## 2017-06-07 RX ORDER — OXYCODONE HCL 10 MG/1
10 TABLET, FILM COATED, EXTENDED RELEASE ORAL EVERY 12 HOURS
Qty: 60 TABLET | Refills: 0 | Status: SHIPPED | OUTPATIENT
Start: 2017-06-07 | End: 2017-06-13 | Stop reason: ALTCHOICE

## 2017-06-07 ASSESSMENT — PAIN SCALES - GENERAL: PAINLEVEL: EXTREME PAIN (8)

## 2017-06-07 NOTE — PROGRESS NOTES
"                          [facility]    Date of visit: 6/7/2017    Chief complaint:   Chief Complaint   Patient presents with     Pain     low back and left knee       Interval history:  Diego Meng is a 41 year old male last seen by me on 5/17/17.    Since his last visit, Diego Meng reports:  -Has an appointment scheduled in neurosurgery with Dr. Ayala on 6/14/17. He also has an appointment to see Dr. Rios who did his previous surgery.  -Has an appointment scheduled in sports medicine with Dr. Kaufman on 6/12/17. He had a knee injection, which did not provide relief of his knee pain. He is concerned he may need surgery.  -Has an appointment scheduled with Dr. Vallecillo on 6/30/17 for transition of care.  -Trigger point injection did not provide relief, but the robaxin is providing relief of the muscle spasm and he is able to stand up straighter.  -Has been taking suboxone 8-2 mg #4-6/day, which does not seem to be providing relief.   -Toradol caused upset stomach.  -Lumbar pain is the worst area, left side, radiates to the posterolateral hip. This pain flared up over the past month and is described as sharp and continuous and is rated 6-9/10. The pain is aggravated by sitting and standing. His low back pain also is radiating down the right posterior thigh and leg to the heel. The pain is much worse in his back than in his leg. He had a lumbar epidural steroid injection after the surgery, which flared up his pain. The right leg gives way at times. Denies any numbness but is having some tingling. Was told that a lumbar fusion is the next step in 2015 when he last saw Dr. Rios. He would like to get another surgical opinion.   -He has not scheduled with Dr. Benitez yet. We had a lengthy discussion about how anxiety and the \"noise in his head\" can be contributing to his pain cycle. He reports that his anxiety has settled down since getting a new job 3 months ago.  -His " headaches are unchanged. They start at the back of his head/neck and radiate through his head to his eyes. We reviewed his cervical MRI and he is interested in proceeding with medial branch blocks. He has had medial branch blocks for his low back pain in the past, which did not provide significant relief. He attributes his headaches to rebound and has a very difficult time discontinuing excedrin.     Pain scores:  Pain intensity on average is 8 (up from 5) on a scale of 0-10.     Current pain treatments:   suboxone 8-2 mg #4-6/day  excedrin #2-6/day for 20 years  lexapro 20 mg daily  Clonazepam 1 mg #1-2/day    Side Effects: no side effect    THE 4 A's OF OPIOID MAINTENANCE ANALGESIA    Analgesia:minimal    Activity: less    Adverse effects: none    Adherence to Rx protocol: good      Past pain treatments:  Diego GALDAMEZ Azramike has been seen at a pain clinic in the past. [facility] Dr. Vázquez and [facility]  Medications:     Medrol dose carolin -no relief              botox -no relief              Anti-migraine: fioricet, migranal, eletriptan, frovatriptan, midrin, naraptriptan, sumatriptan, zolmitripatn -no relief from any of these              Anti-convulsants: depakote, topiramate, gabapentin, lyrica -all of these made him feel fuzzy and forgetful and they were not helpful              Opioids: oxycontin, morphine -no relief, hydrocodone -no relief, tramadol -no relief                Muscle relaxants: soma -no relief, flexeril -no relief and made him tired, norflex -no relief, tizanidine -no relief and made him tired              NSAIDs: allergic to this class of medications -rash, abdominal pain and cramping, toradol -upset stomach              Anti-depressants: cymbalta -no relief                Topicals: lidocaine -no relief  PT: [facility] in 2016 -helped for shoulder but flared up back and neck  Psychology: yes for pain at the [facility] -'continues to use these  skills'  Acupuncture: tried it -no relief  Chiropractic care: tried it -helped initially but then flared up pain  TENS Unit: no relief  Injections: R L4 and L5 TF BAY 12/24/16 -flared pain, cervical epidural steroid injection -provided 2-4 weeks of relief but had significant flare up for 2 weeks post-procedure, cervical trigger point injections -minimal relief, lumbar medial branch block -no relief, lumbar paraspinal trigger point injection 5/17/17  Surgery: right L4-L5 hemilaminectomy May 2015 Dr. Rios    Medications:  Current Outpatient Prescriptions   Medication Sig Dispense Refill     ketorolac (TORADOL) 10 MG tablet Take 1 tablet (10 mg) by mouth every 6 hours as needed for moderate pain 20 tablet 1     methocarbamol (ROBAXIN) 500 MG tablet Take 1-2 tablets (500-1,000 mg) by mouth 4 times daily as needed for muscle spasms 240 tablet 3     clonazePAM (KLONOPIN) 1 MG tablet Take 1 tablet (1 mg) by mouth 4 times daily 120 tablet 0     buprenorphine (BUTRANS) 20 MCG/HR WK patch Place 1 patch onto the skin every 7 days 4 patch 0     buprenorphine HCl-naloxone HCl (SUBOXONE) 8-2 MG per film Place 1 Film under the tongue every 6 hours (Patient not taking: Reported on 5/17/2017) 120 Film 0     atorvastatin (LIPITOR) 40 MG tablet Take 1 tablet (40 mg) by mouth daily 90 tablet 1     esomeprazole (NEXIUM) 20 MG capsule Take 1 capsule (20 mg) by mouth every morning (before breakfast) Take 30-60 minutes before eating. 30 capsule 0     omega 3 1000 MG CAPS Take 1 g by mouth daily 90 capsule      ESCITALOPRAM OXALATE PO Take 20 mg by mouth daily         Medical History: any changes in medical history since they were last seen? no    Review of Systems:  The 14 system ROS was reviewed from the intake questionnaire, and is positive for: headache  Any bowel or bladder problems: none  Mood: anxiety, stress    Physical Exam:  Blood pressure 112/74, pulse 74, SpO2 98 %.  General: no acute distress, pleasant, sitting comfortably  but leaning to the left  Gait: antalgic  MSK: full strength but with some guarding due to pain, full sensation, left-sided lumbar paraspinal muscle spasms, reflexes symmetric    Imaging:  Lumbar MRI completed on 4/24/17:  FINDINGS: Five lumbar vertebrae are assumed. Degenerative loss of disc  signal with relatively normal disc height is noted at L4-L5 and L5-S1.  Minimal discogenic endplate reactive marrow edema is noted posteriorly  at L4-L5 as well. Apophyseal joints are essentially within normal  limits. Vertebral body heights and sagittal alignment are within  normal limits. The conus medullaris is unremarkable in appearance on  the sagittal images.      T12-L1, L1-L2, L2-L3, L3-L4: Normal.     L4-L5: Mild annular bulging and endplate spurring with mild foraminal  narrowing below the exiting nerve roots. Posterior high signal annular  fissuring with minimal central disc protrusion. No central stenosis.     L5-S1: Small 0.3 cm caudally dissecting disc extrusion to the left of  midline, similar in appearance to 3/10/2015. This does not indent the  thecal sac or compress/displace the left S1 nerve root. No abnormal  enhancement. No central or foraminal stenosis.         IMPRESSION:  1. L5-S1 small caudally dissecting 0.3 cm disc extrusion slightly to  the left of midline without apparent neural impingement or stenosis.  2. L4-L5 small central disc protrusion, without apparent neural  impingement or central stenosis, and mild bilateral foraminal  stenosis.    Lumbar MRI completed on 8/1/15 showed:  CONCLUSION:    1. Interval right hemilaminotomy and partial facetectomy at L4-L5. No recurrent disc herniation.  2. Other minor degenerative changes. Annular fissures L4-L5 and L5-S1. No high-grade central canal or foraminal stenosis at any level.    Cervical spine MRI completed on 11/14/14:  FINDINGS: Cervical spine visualized through the upper thoracic spine. Lordosis normal. Slight retrolisthesis at each level between  C3 and C6. Vertebral body heights are normal. Marrow signal is normal, including the facets. Paraspinous soft tissues are unremarkable. Visible posterior fossa contents are unremarkable. Cord signal and morphology is normal.  CRANIOCERVICAL JUNCTION: Mild anterior spurring at C1-C2. Foramen magnum and central canal are adequate.  C2-C3: Disc height normal. Mild facet hypertrophy. No central canal or foraminal stenosis.  C3-C4: Disc height normal. Slight right uncinate hypertrophy. Minimal facet hypertrophy. No significant central canal or foraminal stenosis.    C4-C5: Slight disc height loss. Mild bilateral facet hypertrophy. No central canal or foraminal stenosis.  C5-C6: Mild disc height loss with left eccentric disc osteophyte complex. Mild bilateral facet hypertrophy. No central canal or right foraminal stenosis. Moderate left foraminal stenosis.    C6-C7: Slight disc height loss. Mild facet hypertrophy. No central canal or foraminal stenosis.  C7-T1: Mild disc height loss. Mild to moderate bilateral facet hypertrophy. No central canal or foraminal stenosis.  CONCLUSION:  1. Moderate left foraminal stenosis at C5-C6.  2. Other relatively minor degenerative changes, no further high-grade central canal or foraminal stenosis.     Right shoulder x-ray completed on 4/2/15:  FINDINGS: No evidence of fracture or dislocation. Normal glenohumeral joint alignment without evidence of degenerative changes. No evidence of significant acromioclavicular joint degenerative joint disease. No acromial or coracoid enthesophyte. Mild undersurface acromial sclerosis. Downslope type three acromion. No blastic or lytic lesions.  IMPRESSION:  Right shoulder type three acromion otherwise within normal limits.     [place] Board of Pharmacy Data Base Reviewed: YES; as expected, no concern for abuse or misuse    Assessment:   1. Chronic pain syndrome  2. Opioid dependency  3. Chronic low back pain. Right L4-L5 hemilaminectomy partial  facetectomy in May 2015 with Dr. Rios. Now with right sided radicular symptoms and left-sided lumbar spasms  4. Chronic neck pain. Cervical facet arthropathy.  5. Chronic headaches. Likely related to facet arthropathy, psychogenic, and rebound (excedrin)  6. Anxiety with panic attacks. Takes lexapro and clonazepam. Stable.  7. Tobacco use disorder  8. Chronic bilateral knee pain. Left knee meniscus repair on 11/28/16 with Dr. Kaufman.    9. Chronic right shoulder pain.  10. No self cares. Reliant on medication.   11. Working full time as an .     Plan:  1. Medication Management: UDS with expected and appropriate results and opioid agreement on 11/2/17  -Start oxycodone-acetaminophen  mg max #2/day and oxycontin 10 mg every 12 hours while participating in PT and then consider going back to subuxone. Absolutely no dose escalations for the chronic pain.  -Robaxin 500-1000 mg four times a day as needed.  2. Follow up: with Dr. Reba Vallecillo scheduled for 6/30/17.  3. Referrals: neurosurgery  4. Follow up with Dr. Kaufman regarding knee pain -scheduled for 6/12/17.  5. Schedule with Sarah for PT.    Total time spent was 30 minutes, and more than 50% of face to face time was spent in counseling and/or coordination of care regarding the above assessment and plan.    Prudence Almonte DO  [facility]  [facility]

## 2017-06-07 NOTE — NURSING NOTE
"Chief Complaint   Patient presents with     Pain     low back and left knee       Initial /74  Pulse 74  SpO2 98% Estimated body mass index is 28.73 kg/(m^2) as calculated from the following:    Height as of 5/25/17: 1.803 m (5' 11\").    Weight as of 5/25/17: 93.4 kg (206 lb).  Medication Reconciliation: joseph Gutierrez CMA   [facility]    "

## 2017-06-07 NOTE — MR AVS SNAPSHOT
After Visit Summary   6/7/2017    Diego Meng    MRN: [id number]           Patient Information     Date Of Birth          1975        Visit Information        Provider Department      6/7/2017 10:30 AM Prudence Almonte DO [facility]        Today's Diagnoses     Lumbar radiculopathy    -  1    Back muscle spasm          Care Instructions    1. Medication Management: UDS with expected and appropriate results and opioid agreement on 11/2/17  -Start oxycodone-acetaminophen  mg max #2/day and oxycontin 10 mg every 12 hours while participating in PT and then consider going back to subuxone. Absolutely no dose escalations for the chronic pain.  -Robaxin 500-1000 mg four times a day as needed.  2. Follow up: with Dr. Reba Vallecillo scheduled for 6/30/17.  3. Referrals: neurosurgery  4. Follow up with Dr. Kaufman regarding knee pain -scheduled for 6/12/17.  5. Schedule with Sarah for PT.    ----------------------------------------------------------------  Nurse Triage line:  [phone]   Call this number with any questions or concerns. You may leave a detailed message anytime. Calls are typically returned Monday through Friday between 8 AM and 4:30 PM. We usually get back to you within 2 business days depending on the issue/request.       Medication refills:    For non-narcotic medications, call your pharmacy directly to request a refill. The pharmacy will contact the [facility] for authorization. Please allow 3-4 days for these refills to be processed.     For narcotic refills, call the nurse triage line or send a Ze[employer] message. Please contact us 7-10 days before your refill is due. The message MUST include the name of the specific medication(s) requested and how you would like to receive the prescription(s). The options are as follows:    Pain Clinic staff can mail the prescription to your pharmacy. Please tell us the name of the pharmacy.    You may pick the  prescription up at the Pain Clinic (tell us the location) or during a clinic visit with your pain provider    Pain Clinic staff can deliver the prescription to the [facility] pharmacy in the clinic building. Please tell us the location.      Scheduling number: [phone].  Call this number to schedule or change appointments.    We believe regular attendance is key to your success in our program.    Any time you are unable to keep your appointment we ask that you call us at least 24 hours in advance to let us know. This will allow us to offer the appointment time to another patient.       ----------------------------------------------------------------  Nurse Triage line:  [phone]   Call this number with any questions or concerns. You may leave a detailed message anytime. Calls are typically returned Monday through Friday between 8 AM and 4:30 PM. We usually get back to you within 2 business days depending on the issue/request.       Medication refills:    For non-narcotic medications, call your pharmacy directly to request a refill. The pharmacy will contact the [facility] for authorization. Please allow 3-4 days for these refills to be processed.     For narcotic refills, call the nurse triage line or send a Hubkick message. Please contact us 7-10 days before your refill is due. The message MUST include the name of the specific medication(s) requested and how you would like to receive the prescription(s). The options are as follows:    Pain Clinic staff can mail the prescription to your pharmacy. Please tell us the name of the pharmacy.    You may pick the prescription up at the Pain Clinic (tell us the location) or during a clinic visit with your pain provider    Pain Clinic staff can deliver the prescription to the [facility] pharmacy in the clinic building. Please tell us the location.      Scheduling number: [phone].  Call this number to schedule or change appointments.    We believe regular  attendance is key to your success in our program.    Any time you are unable to keep your appointment we ask that you call us at least 24 hours in advance to let us know. This will allow us to offer the appointment time to another patient.             Follow-ups after your visit        Your next 10 appointments already scheduled     Jun 12, 2017  3:20 PM CDT   Return Visit with Geovani Kaufman MD   [facility]    [address]  [address]  [place] [address]   [phone]            Jun 14, 2017  3:00 PM CDT   New Visit with Yoel Ayala MD   [facility] ([facility])    [address]  [place] [address] [phone]            Jun 30, 2017  3:30 PM CDT   Return Visit with Reba Vallecillo MD   [facility] ([facility])    [address]  [place] [address]   [phone]              Who to contact     If you have questions or need follow up information about today's clinic visit or your schedule please contact [facility] directly at [phone].  Normal or non-critical lab and imaging results will be communicated to you by MyChart, letter or phone within 4 business days after the clinic has received the results. If you do not hear from us within 7 days, please contact the clinic through dakickhart or phone. If you have a critical or abnormal lab result, we will notify you by phone as soon as possible.  Submit refill requests through Aniboom or call your pharmacy and they will forward the refill request to us. Please allow 3 business days for your refill to be completed.          Additional Information About Your Visit        Aniboom Information     Aniboom gives you secure access to your electronic health record. If you see a primary care provider, you can also send messages to your care  team and make appointments. If you have questions, please call your primary care clinic.  If you do not have a primary care provider, please call [phone] and they will assist you.        Care EveryWhere ID     This is your Care EveryWhere ID. This could be used by other organizations to access your [facility] medical records  [id number]        Your Vitals Were     Pulse Pulse Oximetry                74 98%           Blood Pressure from Last 3 Encounters:   06/07/17 112/74   05/17/17 104/64   05/15/17 120/70    Weight from Last 3 Encounters:   05/25/17 93.4 kg (206 lb)   05/17/17 93.4 kg (206 lb)   05/15/17 93.4 kg (206 lb)              Today, you had the following     No orders found for display         Today's Medication Changes          These changes are accurate as of: 6/7/17 11:19 AM.  If you have any questions, ask your nurse or doctor.               Start taking these medicines.        Dose/Directions    oxyCODONE 10 MG 12 hr tablet   Commonly known as:  OXYCONTIN   Used for:  Lumbar radiculopathy, Back muscle spasm        Dose:  10 mg   Take 1 tablet (10 mg) by mouth every 12 hours   Quantity:  60 tablet   Refills:  0       oxyCODONE-acetaminophen  MG per tablet   Commonly known as:  PERCOCET   Used for:  Lumbar radiculopathy, Back muscle spasm        Dose:  1 tablet   Take 1 tablet by mouth every 4 hours as needed for moderate to severe pain maximum 2 tablet(s) per day   Quantity:  60 tablet   Refills:  0            Where to get your medicines      Some of these will need a paper prescription and others can be bought over the counter.  Ask your nurse if you have questions.     Bring a paper prescription for each of these medications     oxyCODONE 10 MG 12 hr tablet    oxyCODONE-acetaminophen  MG per tablet                Primary Care Provider Office Phone # Fax #    Priscilla Murphy [phone]       [facility] TE[facility] [address] [place] [address]         Thank you!     Thank you for choosing [facility]  for your care. Our goal is always to provide you with excellent care. Hearing back from our patients is one way we can continue to improve our services. Please take a few minutes to complete the written survey that you may receive in the mail after your visit with us. Thank you!             Your Updated Medication List - Protect others around you: Learn how to safely use, store and throw away your medicines at www.disposemymeds.org.          This list is accurate as of: 6/7/17 11:19 AM.  Always use your most recent med list.                   Brand Name Dispense Instructions for use    atorvastatin 40 MG tablet    LIPITOR    90 tablet    Take 1 tablet (40 mg) by mouth daily       buprenorphine 20 MCG/HR WK patch    BUTRANS    4 patch    Place 1 patch onto the skin every 7 days       buprenorphine HCl-naloxone HCl 8-2 MG per film    SUBOXONE    120 Film    Place 1 Film under the tongue every 6 hours       clonazePAM 1 MG tablet    klonoPIN    120 tablet    Take 1 tablet (1 mg) by mouth 4 times daily       ESCITALOPRAM OXALATE PO      Take 20 mg by mouth daily       esomeprazole 20 MG CR capsule    nexIUM    30 capsule    Take 1 capsule (20 mg) by mouth every morning (before breakfast) Take 30-60 minutes before eating.       ketorolac 10 MG tablet    TORADOL    20 tablet    Take 1 tablet (10 mg) by mouth every 6 hours as needed for moderate pain       methocarbamol 500 MG tablet    ROBAXIN    240 tablet    Take 1-2 tablets (500-1,000 mg) by mouth 4 times daily as needed for muscle spasms       omega 3 1000 MG Caps     90 capsule    Take 1 g by mouth daily       oxyCODONE 10 MG 12 hr tablet    OXYCONTIN    60 tablet    Take 1 tablet (10 mg) by mouth every 12 hours       oxyCODONE-acetaminophen  MG per tablet    PERCOCET    60 tablet    Take 1 tablet by mouth every 4 hours as needed for moderate to severe pain maximum 2 tablet(s) per day

## 2017-06-07 NOTE — PATIENT INSTRUCTIONS
1. Medication Management: UDS with expected and appropriate results and opioid agreement on 11/2/17  -Start oxycodone-acetaminophen  mg max #2/day and oxycontin 10 mg every 12 hours while participating in PT and then consider going back to subuxone. Absolutely no dose escalations for the chronic pain.  -Robaxin 500-1000 mg four times a day as needed.  2. Follow up: with Dr. Reba Vallecillo scheduled for 6/30/17.  3. Referrals: neurosurgery  4. Follow up with Dr. Kaufman regarding knee pain -scheduled for 6/12/17.  5. Schedule with Sarah for PT.    ----------------------------------------------------------------  Nurse Triage line:  [phone]   Call this number with any questions or concerns. You may leave a detailed message anytime. Calls are typically returned Monday through Friday between 8 AM and 4:30 PM. We usually get back to you within 2 business days depending on the issue/request.       Medication refills:    For non-narcotic medications, call your pharmacy directly to request a refill. The pharmacy will contact the [facility] for authorization. Please allow 3-4 days for these refills to be processed.     For narcotic refills, call the nurse triage line or send a [employer] message. Please contact us 7-10 days before your refill is due. The message MUST include the name of the specific medication(s) requested and how you would like to receive the prescription(s). The options are as follows:    Pain Clinic staff can mail the prescription to your pharmacy. Please tell us the name of the pharmacy.    You may pick the prescription up at the Pain Clinic (tell us the location) or during a clinic visit with your pain provider    Pain Clinic staff can deliver the prescription to the [facility] pharmacy in the clinic building. Please tell us the location.      Scheduling number: [phone].  Call this number to schedule or change appointments.    We believe regular attendance is key to your success in  our program.    Any time you are unable to keep your appointment we ask that you call us at least 24 hours in advance to let us know. This will allow us to offer the appointment time to another patient.       ----------------------------------------------------------------  Nurse Triage line:  [phone]   Call this number with any questions or concerns. You may leave a detailed message anytime. Calls are typically returned Monday through Friday between 8 AM and 4:30 PM. We usually get back to you within 2 business days depending on the issue/request.       Medication refills:    For non-narcotic medications, call your pharmacy directly to request a refill. The pharmacy will contact the [facility] Center for authorization. Please allow 3-4 days for these refills to be processed.     For narcotic refills, call the nurse triage line or send a ImpressPages message. Please contact us 7-10 days before your refill is due. The message MUST include the name of the specific medication(s) requested and how you would like to receive the prescription(s). The options are as follows:    Pain Clinic staff can mail the prescription to your pharmacy. Please tell us the name of the pharmacy.    You may pick the prescription up at the Pain Clinic (tell us the location) or during a clinic visit with your pain provider    Pain Clinic staff can deliver the prescription to the [facility] pharmacy in the clinic building. Please tell us the location.      Scheduling number: [phone].  Call this number to schedule or change appointments.    We believe regular attendance is key to your success in our program.    Any time you are unable to keep your appointment we ask that you call us at least 24 hours in advance to let us know. This will allow us to offer the appointment time to another patient.

## 2017-06-12 ENCOUNTER — TELEPHONE (OUTPATIENT)
Dept: PALLIATIVE MEDICINE | Facility: CLINIC | Age: 42
End: 2017-06-12

## 2017-06-12 DIAGNOSIS — G89.4 CHRONIC PAIN SYNDROME: ICD-10-CM

## 2017-06-12 DIAGNOSIS — F11.20 CONTINUOUS OPIOID DEPENDENCE (H): ICD-10-CM

## 2017-06-12 NOTE — TELEPHONE ENCOUNTER
Pt LM at 0948 on 6/12 wondering if he could switch back to suboxone. States he had switched meds, but the new one wasn't working. Would like refill on suboxone if he can switch back. Please call. Phone #[phone]     Samina Cuellar    [facility]

## 2017-06-13 RX ORDER — BUPRENORPHINE AND NALOXONE 8; 2 MG/1; MG/1
1 FILM, SOLUBLE BUCCAL; SUBLINGUAL EVERY 6 HOURS
Qty: 120 FILM | Refills: 0 | Status: SHIPPED | OUTPATIENT
Start: 2017-06-13 | End: 2017-07-14

## 2017-06-13 NOTE — TELEPHONE ENCOUNTER
I agree that he should go back on suboxone 8-2 mg #4/day.    Prudence Almonte,   [facility] [place]

## 2017-06-13 NOTE — TELEPHONE ENCOUNTER
"Called patient. He reports that at last office visit he was changed from Suboxone to Oxycontin and Oxycodone so that he could participate in PT. He can not get into PT until 06/21/17.  Reports that these \"Meds have been working fine, maybe too well\" (meds meaning Oxycontin and Oxycodone).  States that he finds himself wanting to take more than he should. He acknowledges that he can not take more than prescribed and that he currently would go out early. He states that he is finding himself \"not is a good place\" in regards to having these medications prescribed to him. He states that he does not want to \"end up in a dark room with withdrawal\" when he does run out of medication early. He would like to go back to Suboxone and stay on it throughout his PT sessions. He is not aware what he has left of either Oxycontin or Oxycodone as it is at home and he is at work.  Advised that I would discuss with provider what his plan will be and nursing will contact him back with plan. He states it is Ok to leave detailed message at number on file. He will be in and out of meetings and would appreciate a message if he is unable to answer.       Routing to provider to review for possible change back to Suboxone and also for destruction of home supply of Oxycontin and Oxycodone.     Trena MARCN-RN Care Coordinator  [facility]    "

## 2017-06-13 NOTE — TELEPHONE ENCOUNTER
Called patient. Per Dr. Almonte he should bring in his Oxycontin and Oxycodone supply for count/destruction. He should stop his Oxycontin now, continue Oxycodone until tomorrow when he is able to come in to pick this script up/destroy medications, begin Suboxone when he begins to feel s/s of WD. Patient will arrive 815 for nurse only visit for med destruction and Suboxone . Nurse only visit scheduled. Script in lock box      Trena MARCN-RN Care Coordinator  [facility]

## 2017-06-14 ENCOUNTER — ALLIED HEALTH/NURSE VISIT (OUTPATIENT)
Dept: PALLIATIVE MEDICINE | Facility: CLINIC | Age: 42
End: 2017-06-14
Payer: COMMERCIAL

## 2017-06-14 DIAGNOSIS — G89.4 CHRONIC PAIN SYNDROME: Primary | ICD-10-CM

## 2017-06-14 PROCEDURE — 99207 ZZC NO CHARGE NURSE ONLY: CPT

## 2017-06-14 NOTE — NURSING NOTE
Diego presented today for exchange of Oxycodone/Oxycontin to go back onto Suboxone. See telephone encounter of 6/12/17. When Diego arrived, he reported he has no medication to exchange which contradicts what he told nursing staff yesterday. He said he has taken it all. Reports taking his last dose morning of 6/13/17.     Dr. Almonte notified via text. OK to give Suboxone script.     He took last dose of any form of Oxycodone morning of 6/13/17 and took first Suboxone evening of same day.     He reports he has a follow up appointment scheduled 6/30/17.    Joana Christianson, MSN, RN-BC  Care Coordinator  [facility]

## 2017-06-14 NOTE — TELEPHONE ENCOUNTER
Diego came for his nurse only visit but reported he had no medication to exchange, he had taken it all.     Dr. Almonte notified - ok to give Suboxone RX.  See nurse only encounter of 6/14/17.    Joana Christianson, MSN, RN-BC  Care Coordinator  [facility]

## 2017-06-14 NOTE — MR AVS SNAPSHOT
After Visit Summary   6/14/2017    Diego Meng    MRN: [id number]           Patient Information     Date Of Birth          1975        Visit Information        Provider Department      6/14/2017 8:30 AM [facility] NURSE [facility]        Today's Diagnoses     Chronic pain syndrome    -  1       Follow-ups after your visit        Your next 10 appointments already scheduled     Jun 14, 2017  9:20 AM CDT   New Visit with Yoel Ayala MD   [facility] ([facility])    [address]  [place] [address]   [phone]            Jun 16, 2017 11:40 AM CDT   Office Visit with Priscilla Murphy MD   [facility]    [address]  [place] [id number] [phone]           Bring a current list of meds and any records pertaining to this visit.  For Physicals, please bring immunization records and any forms needing to be filled out.  Please arrive 10 minutes early to complete paperwork.            Jun 21, 2017  7:20 AM CDT   [facility] with Sarah Chakraborty, PT   [facility] [facility] ([facility] [place])    [address]  [place] [id number] [phone]            Jun 28, 2017  7:20 AM CDT   [facility] with Sarah Chakraborty, PT   [facility] ([facility] [place])    [address]  [place] [id number] [phone]            Jun 30, 2017  3:30 PM CDT   Return Visit with Reba Vallecillo MD   [facility] ([facility])    [address]  [address]  [place] [address]   [phone]            Jul 05, 2017  7:20 AM CDT   [facility] Sp[facility] with Sarah Chakraborty PT   [facility] [facility] ([facility] [place])    [address]  [place] [address] [phone]              Who to contact     If you have questions or need  follow up information about today's clinic visit or your schedule please contact [facility] directly at [phone].  Normal or non-critical lab and imaging results will be communicated to you by MyChart, letter or phone within 4 business days after the clinic has received the results. If you do not hear from us within 7 days, please contact the clinic through Tradition Midstreamhart or phone. If you have a critical or abnormal lab result, we will notify you by phone as soon as possible.  Submit refill requests through Facio or call your pharmacy and they will forward the refill request to us. Please allow 3 business days for your refill to be completed.          Additional Information About Your Visit        Tradition Midstreamhart Information     Facio gives you secure access to your electronic health record. If you see a primary care provider, you can also send messages to your care team and make appointments. If you have questions, please call your primary care clinic.  If you do not have a primary care provider, please call [phone] and they will assist you.        Care EveryWhere ID     This is your Care EveryWhere ID. This could be used by other organizations to access your [facility] medical records  [id number]         Blood Pressure from Last 3 Encounters:   06/07/17 112/74   05/17/17 104/64   05/15/17 120/70    Weight from Last 3 Encounters:   05/25/17 93.4 kg (206 lb)   05/17/17 93.4 kg (206 lb)   05/15/17 93.4 kg (206 lb)              Today, you had the following     No orders found for display       Primary Care Provider Office Phone # Fax #    Priscilla uMrphy [phone]       [facility] TE[facility] [address] [place] [address]        Thank you!     Thank you for choosing [facility]  for your care. Our goal is always to provide you with excellent care. Hearing back from our patients is one way we can continue to improve our services. Please take a few  minutes to complete the written survey that you may receive in the mail after your visit with us. Thank you!             Your Updated Medication List - Protect others around you: Learn how to safely use, store and throw away your medicines at www.disposemymeds.org.          This list is accurate as of: 6/14/17  9:02 AM.  Always use your most recent med list.                   Brand Name Dispense Instructions for use    atorvastatin 40 MG tablet    LIPITOR    90 tablet    Take 1 tablet (40 mg) by mouth daily       buprenorphine HCl-naloxone HCl 8-2 MG per film    SUBOXONE    120 Film    Place 1 Film under the tongue every 6 hours       clonazePAM 1 MG tablet    klonoPIN    120 tablet    Take 1 tablet (1 mg) by mouth 4 times daily       ESCITALOPRAM OXALATE PO      Take 20 mg by mouth daily       esomeprazole 20 MG CR capsule    nexIUM    30 capsule    Take 1 capsule (20 mg) by mouth every morning (before breakfast) Take 30-60 minutes before eating.       ketorolac 10 MG tablet    TORADOL    20 tablet    Take 1 tablet (10 mg) by mouth every 6 hours as needed for moderate pain       methocarbamol 500 MG tablet    ROBAXIN    240 tablet    Take 1-2 tablets (500-1,000 mg) by mouth 4 times daily as needed for muscle spasms       omega 3 1000 MG Caps     90 capsule    Take 1 g by mouth daily

## 2017-06-16 ENCOUNTER — OFFICE VISIT (OUTPATIENT)
Dept: PEDIATRICS | Facility: CLINIC | Age: 42
End: 2017-06-16
Payer: COMMERCIAL

## 2017-06-16 VITALS
TEMPERATURE: 98.5 F | HEART RATE: 70 BPM | BODY MASS INDEX: 28.56 KG/M2 | SYSTOLIC BLOOD PRESSURE: 88 MMHG | WEIGHT: 204 LBS | DIASTOLIC BLOOD PRESSURE: 48 MMHG | OXYGEN SATURATION: 96 % | HEIGHT: 71 IN

## 2017-06-16 DIAGNOSIS — F41.1 GAD (GENERALIZED ANXIETY DISORDER): Primary | ICD-10-CM

## 2017-06-16 DIAGNOSIS — E78.5 HYPERLIPIDEMIA LDL GOAL <160: ICD-10-CM

## 2017-06-16 DIAGNOSIS — G89.29 CHRONIC PAIN OF LEFT KNEE: ICD-10-CM

## 2017-06-16 DIAGNOSIS — M25.562 CHRONIC PAIN OF LEFT KNEE: ICD-10-CM

## 2017-06-16 PROCEDURE — 99214 OFFICE O/P EST MOD 30 MIN: CPT | Performed by: PEDIATRICS

## 2017-06-16 RX ORDER — ALPRAZOLAM 0.5 MG
0.5 TABLET ORAL 2 TIMES DAILY PRN
Qty: 60 TABLET | Refills: 0 | Status: SHIPPED | OUTPATIENT
Start: 2017-06-16 | End: 2017-08-17

## 2017-06-16 RX ORDER — ATORVASTATIN CALCIUM 40 MG/1
40 TABLET, FILM COATED ORAL DAILY
Qty: 90 TABLET | Refills: 1 | Status: SHIPPED | OUTPATIENT
Start: 2017-06-16 | End: 2018-01-02

## 2017-06-16 RX ORDER — ESCITALOPRAM OXALATE 20 MG/1
20 TABLET ORAL DAILY
Qty: 90 TABLET | Refills: 1 | Status: SHIPPED | OUTPATIENT
Start: 2017-06-16 | End: 2018-01-02

## 2017-06-16 NOTE — PATIENT INSTRUCTIONS
STOP clonazepam.    START xanax     Follow up in 2 months    Referral below for [facility]:

## 2017-06-16 NOTE — MR AVS SNAPSHOT
After Visit Summary   6/16/2017    Diego Meng    MRN: [id number]           Patient Information     Date Of Birth          1975        Visit Information        Provider Department      6/16/2017 11:40 AM Priscilla Murphy MD [facility]        Today's Diagnoses     BRIDGER (generalized anxiety disorder)    -  1    Hyperlipidemia LDL goal <160        Chronic pain of left knee          Care Instructions    STOP clonazepam.    START xanax     Follow up in 2 months    Referral below for [facility]:          Follow-ups after your visit        Additional Services     ORTHOPEDICS ADULT REFERRAL       Your provider has referred you to: [facility] [phone]   [email or web address]    Please be aware that coverage of these services is subject to the terms and limitations of your health insurance plan.  Call member services at your health plan with any benefit or coverage questions.      Please bring the following to your appointment:    >>   Any x-rays, CTs or MRIs which have been performed.  Contact the facility where they were done to arrange for  prior to your scheduled appointment.    >>   List of current medications   >>   This referral request   >>   Any documents/labs given to you for this referral                  Your next 10 appointments already scheduled     Jun 21, 2017  7:20 AM CDT   [facility] with Sarah Chakraborty PT   [facility] ([facility])    [address] [place] [address]   [phone]            Jun 28, 2017  7:20 AM CDT   [facility] with Sarah Chakraborty PT   [facility] ([facility])    [address] [place] [address]   [phone]            Jun 30, 2017  3:30 PM CDT   Return Visit with Reba Vallecillo MD   [facility] ([facility])    [address]  [address]  [facility] [address]   [phone]            Jul 05, 2017  7:20 AM CDT   [facility] with Sarah Chakraborty PT   [facility] ([facility])    [address] [place] [id number] [phone]              Who to contact     If you have questions or need follow up information about today's clinic visit or your schedule please contact [facility] directly at [phone].  Normal or non-critical lab and imaging results will be communicated to you by ison furniturehart, letter or phone within 4 business days after the clinic has received the results. If you do not hear from us within 7 days, please contact the clinic through [employer] or phone. If you have a critical or abnormal lab result, we will notify you by phone as soon as possible.  Submit refill requests through [employer] or call your pharmacy and they will forward the refill request to us. Please allow 3 business days for your refill to be completed.          Additional Information About Your Visit        ison furnitureharVenuu Information     [employer] gives you secure access to your electronic health record. If you see a primary care provider, you can also send messages to your care team and make appointments. If you have questions, please call your primary care clinic.  If you do not have a primary care provider, please call [phone] and they will assist you.        Care EveryWhere ID     This is your Care EveryWhere ID. This could be used by other organizations to access your [facility] medical records  [id number]        Your Vitals Were     Pulse Temperature Height Pulse Oximetry BMI (Body Mass Index)       70 98.5  F (36.9  C) (Oral) 5' 11\" (1.803 m) 96% 28.45 kg/m2        Blood Pressure from Last 3 Encounters:   06/16/17 (!) 88/48   06/07/17 112/74   05/17/17 104/64    Weight from Last 3 Encounters:   06/16/17 204 lb (92.5 kg)   05/25/17 206 lb (93.4 kg)   05/17/17 206 lb (93.4 kg)              We Performed the Following     " ORTHOPEDICS ADULT REFERRAL          Today's Medication Changes          These changes are accurate as of: 6/16/17 12:40 PM.  If you have any questions, ask your nurse or doctor.               Start taking these medicines.        Dose/Directions    ALPRAZolam 0.5 MG tablet   Commonly known as:  XANAX   Used for:  BRIDGER (generalized anxiety disorder)   Started by:  Priscilla Murphy MD        Dose:  0.5 mg   Take 1 tablet (0.5 mg) by mouth 2 times daily as needed for anxiety   Quantity:  60 tablet   Refills:  0         These medicines have changed or have updated prescriptions.        Dose/Directions    * ESCITALOPRAM OXALATE PO   This may have changed:  Another medication with the same name was added. Make sure you understand how and when to take each.        Dose:  20 mg   Take 20 mg by mouth daily   Refills:  0       * escitalopram 20 MG tablet   Commonly known as:  LEXAPRO   This may have changed:  You were already taking a medication with the same name, and this prescription was added. Make sure you understand how and when to take each.   Used for:  BRIDGER (generalized anxiety disorder)   Changed by:  Priscilla Murphy MD        Dose:  20 mg   Take 1 tablet (20 mg) by mouth daily   Quantity:  90 tablet   Refills:  1       * Notice:  This list has 2 medication(s) that are the same as other medications prescribed for you. Read the directions carefully, and ask your doctor or other care provider to review them with you.         Where to get your medicines      These medications were sent to [facility] [place] - [address]   [address]  [address], [place] [address]     Phone:  [phone]     atorvastatin 40 MG tablet    escitalopram 20 MG tablet         Some of these will need a paper prescription and others can be bought over the counter.  Ask your nurse if you have questions.     Bring a paper prescription for each of these medications     ALPRAZolam 0.5 MG tablet                 Primary Care Provider Office Phone # Fax #    Priscilla Murphy [phone]       [facility] [address] [place] [address]        Thank you!     [facility]  [facility] care. Our goal is always to provide you with excellent care. Hearing back from our patients is one way we can continue to improve our services. Please take a few minutes to complete the written survey that you may receive in the mail after your visit with us. Thank you!             Your Updated Medication List - Protect others around you: Learn how to safely use, store and throw away your medicines at www.disposemymeds.org.          This list is accurate as of: 6/16/17 12:40 PM.  Always use your most recent med list.                   Brand Name Dispense Instructions for use    ALPRAZolam 0.5 MG tablet    XANAX    60 tablet    Take 1 tablet (0.5 mg) by mouth 2 times daily as needed for anxiety       atorvastatin 40 MG tablet    LIPITOR    90 tablet    Take 1 tablet (40 mg) by mouth daily       buprenorphine HCl-naloxone HCl 8-2 MG per film    SUBOXONE    120 Film    Place 1 Film under the tongue every 6 hours       clonazePAM 1 MG tablet    klonoPIN    120 tablet    Take 1 tablet (1 mg) by mouth 4 times daily       * ESCITALOPRAM OXALATE PO      Take 20 mg by mouth daily       * escitalopram 20 MG tablet    LEXAPRO    90 tablet    Take 1 tablet (20 mg) by mouth daily       esomeprazole 20 MG CR capsule    nexIUM    30 capsule    Take 1 capsule (20 mg) by mouth every morning (before breakfast) Take 30-60 minutes before eating.       ketorolac 10 MG tablet    TORADOL    20 tablet    Take 1 tablet (10 mg) by mouth every 6 hours as needed for moderate pain       methocarbamol 500 MG tablet    ROBAXIN    240 tablet    Take 1-2 tablets (500-1,000 mg) by mouth 4 times daily as needed for muscle spasms       omega 3 1000 MG Caps     90 capsule    Take 1 g by mouth  daily       * Notice:  This list has 2 medication(s) that are the same as other medications prescribed for you. Read the directions carefully, and ask your doctor or other care provider to review them with you.

## 2017-06-16 NOTE — PROGRESS NOTES
"  SUBJECTIVE:                                                    Diego Meng is a 41 year old male who presents to clinic today for the following health issues:      Medication Followup of  Klonopin and Lexapro    Taking Medication as prescribed: yes    Side Effects:  More tired and forgetful than usual.     Medication Helping Symptoms:  yes     Lexapro - going well, needs refill    Clonazepam - noticing  Increased grogginess in AM and more forgetful.  When he doesn't take it he feels better in regards to these symptoms but more anxious.      Ongoing chronic pain - follows at pain clinic, but wondering about fentanyl patches.  States nothing controls his pain.     Left knee pain - previous surgery and recent cortisone injection that only lasted a few days. He would like second opinion about left knee - wondering which other orthopedics to go to.        Problem list and histories reviewed & adjusted, as indicated.  Additional history: as documented        Reviewed and updated as needed this visit by clinical staff       Reviewed and updated as needed this visit by Provider         ROS:  Constitutional, HEENT, cardiovascular, pulmonary, gi and gu systems are negative, except as otherwise noted.    OBJECTIVE:                                                    BP (!) 88/48 (BP Location: Right arm, Cuff Size: Adult Large)  Pulse 70  Temp 98.5  F (36.9  C) (Oral)  Ht 5' 11\" (1.803 m)  Wt 204 lb (92.5 kg)  SpO2 96%  BMI 28.45 kg/m2  Body mass index is 28.45 kg/(m^2).  NA    Diagnostic Test Results:  none      ASSESSMENT/PLAN:                                                        1. BRIDGER (generalized anxiety disorder)  Discussed adding buspar to SSRI - patient declined this - will do 2 month trial of switching back to xanax - max twice daily - do not take at night.  He may need to see psychiatry in the future if this remains uncontrolled.  - ALPRAZolam (XANAX) 0.5 MG tablet; Take 1 tablet (0.5 mg) by mouth 2 " times daily as needed for anxiety  Dispense: 60 tablet; Refill: 0  - escitalopram (LEXAPRO) 20 MG tablet; Take 1 tablet (20 mg) by mouth daily  Dispense: 90 tablet; Refill: 1    2. Hyperlipidemia LDL goal <160  refill  - atorvastatin (LIPITOR) 40 MG tablet; Take 1 tablet (40 mg) by mouth daily  Dispense: 90 tablet; Refill: 1    3. Chronic pain of left knee  Ongoing symptoms - agree with second opinion.  - ORTHOPEDICS ADULT REFERRAL    Regarding chronic pain - discussed opioid induced hyperalgesia again with patient and told him pain would never be to zero.  No fentanyl patch - the remainder of his pain care needs to be through the pain clinic.    Patient Instructions   STOP clonazepam.    START xanax     Follow up in 2 months    Referral below for [facility]:      Priscilla Murphy MD  [facility]

## 2017-06-19 ASSESSMENT — ANXIETY QUESTIONNAIRES
2. NOT BEING ABLE TO STOP OR CONTROL WORRYING: NEARLY EVERY DAY
1. FEELING NERVOUS, ANXIOUS, OR ON EDGE: NEARLY EVERY DAY
6. BECOMING EASILY ANNOYED OR IRRITABLE: SEVERAL DAYS
3. WORRYING TOO MUCH ABOUT DIFFERENT THINGS: NEARLY EVERY DAY
5. BEING SO RESTLESS THAT IT IS HARD TO SIT STILL: NEARLY EVERY DAY
GAD7 TOTAL SCORE: 16
IF YOU CHECKED OFF ANY PROBLEMS ON THIS QUESTIONNAIRE, HOW DIFFICULT HAVE THESE PROBLEMS MADE IT FOR YOU TO DO YOUR WORK, TAKE CARE OF THINGS AT HOME, OR GET ALONG WITH OTHER PEOPLE: SOMEWHAT DIFFICULT
7. FEELING AFRAID AS IF SOMETHING AWFUL MIGHT HAPPEN: NOT AT ALL

## 2017-06-19 ASSESSMENT — PATIENT HEALTH QUESTIONNAIRE - PHQ9: 5. POOR APPETITE OR OVEREATING: NEARLY EVERY DAY

## 2017-06-20 ASSESSMENT — PATIENT HEALTH QUESTIONNAIRE - PHQ9: SUM OF ALL RESPONSES TO PHQ QUESTIONS 1-9: 5

## 2017-06-20 ASSESSMENT — ANXIETY QUESTIONNAIRES: GAD7 TOTAL SCORE: 16

## 2017-06-30 ENCOUNTER — OFFICE VISIT (OUTPATIENT)
Dept: PALLIATIVE MEDICINE | Facility: CLINIC | Age: 42
End: 2017-06-30
Payer: COMMERCIAL

## 2017-06-30 VITALS
BODY MASS INDEX: 28.45 KG/M2 | DIASTOLIC BLOOD PRESSURE: 74 MMHG | HEART RATE: 78 BPM | SYSTOLIC BLOOD PRESSURE: 118 MMHG | OXYGEN SATURATION: 95 % | WEIGHT: 204 LBS

## 2017-06-30 DIAGNOSIS — F11.20 CONTINUOUS OPIOID DEPENDENCE (H): Primary | ICD-10-CM

## 2017-06-30 DIAGNOSIS — Z79.891 ENCOUNTER FOR LONG-TERM OPIATE ANALGESIC USE: ICD-10-CM

## 2017-06-30 DIAGNOSIS — F41.1 GAD (GENERALIZED ANXIETY DISORDER): ICD-10-CM

## 2017-06-30 DIAGNOSIS — G89.4 CHRONIC PAIN SYNDROME: ICD-10-CM

## 2017-06-30 PROCEDURE — 99214 OFFICE O/P EST MOD 30 MIN: CPT | Performed by: ANESTHESIOLOGY

## 2017-06-30 RX ORDER — DEXAMETHASONE SODIUM PHOSPHATE 4 MG/ML
4 INJECTION, SOLUTION INTRA-ARTICULAR; INTRALESIONAL; INTRAMUSCULAR; INTRAVENOUS; SOFT TISSUE ONCE
Qty: 1 ML | Refills: 0 | OUTPATIENT
Start: 2017-06-30 | End: 2017-06-30

## 2017-06-30 RX ORDER — METHYLPREDNISOLONE ACETATE 40 MG/ML
40 INJECTION, SUSPENSION INTRA-ARTICULAR; INTRALESIONAL; INTRAMUSCULAR; SOFT TISSUE ONCE
Qty: 1 ML | Refills: 0 | OUTPATIENT
Start: 2017-06-30 | End: 2017-06-30

## 2017-06-30 RX ORDER — LIDOCAINE HYDROCHLORIDE 10 MG/ML
3 INJECTION, SOLUTION INFILTRATION; PERINEURAL ONCE
Qty: 3 ML | Refills: 0 | OUTPATIENT
Start: 2017-06-30 | End: 2017-06-30

## 2017-06-30 ASSESSMENT — PAIN SCALES - GENERAL: PAINLEVEL: SEVERE PAIN (7)

## 2017-06-30 NOTE — MR AVS SNAPSHOT
After Visit Summary   6/30/2017    Diego Meng    MRN: [id number]           Patient Information     Date Of Birth          1975        Visit Information        Provider Department      6/30/2017 3:30 PM Reba Vallecillo MD [facility]        Today's Diagnoses     Continuous opioid dependence (H)    -  1    Encounter for long-term opiate analgesic use        BRIDGER (generalized anxiety disorder)        Chronic pain syndrome          Care Instructions        ----------------------------------------------------------------  Nurse Triage line:  [phone]   Call this number with any questions or concerns. You may leave a detailed message anytime. Calls are typically returned Monday through Friday between 8 AM and 4:30 PM. We usually get back to you within 2 business days depending on the issue/request.       Medication refills:    For non-narcotic medications, call your pharmacy directly to request a refill. The pharmacy will contact the Pain Management Center for authorization. Please allow 3-4 days for these refills to be processed.     For narcotic refills, call the nurse triage line or send a [employer] message. Please contact us 7-10 days before your refill is due. The message MUST include the name of the specific medication(s) requested and how you would like to receive the prescription(s). The options are as follows:    Pain Clinic staff can mail the prescription to your pharmacy. Please tell us the name of the pharmacy.    You may pick the prescription up at the Pain Clinic (tell us the location) or during a clinic visit with your pain provider    Pain Clinic staff can deliver the prescription to the [facility] pharmacy in the clinic building. Please tell us the location.      Scheduling number: [phone].  Call this number to schedule or change appointments.    We believe regular attendance is key to your success in our program.    Any time you are unable to keep your  appointment we ask that you call us at least 24 hours in advance to let us know. This will allow us to offer the appointment time to another patient.               Follow-ups after your visit        Who to contact     If you have questions or need follow up information about today's clinic visit or your schedule please contact [facility] directly at [phone].  Normal or non-critical lab and imaging results will be communicated to you by Adcadehart, letter or phone within 4 business days after the clinic has received the results. If you do not hear from us within 7 days, please contact the clinic through Adcadehart or phone. If you have a critical or abnormal lab result, we will notify you by phone as soon as possible.  Submit refill requests through [employer] or call your pharmacy and they will forward the refill request to us. Please allow 3 business days for your refill to be completed.          Additional Information About Your Visit        Adcadehart Information     [employer] gives you secure access to your electronic health record. If you see a primary care provider, you can also send messages to your care team and make appointments. If you have questions, please call your primary care clinic.  If you do not have a primary care provider, please call [phone] and they will assist you.        Care EveryWhere ID     This is your Care EveryWhere ID. This could be used by other organizations to access your [facility] medical records  [id number]        Your Vitals Were     Pulse Pulse Oximetry BMI (Body Mass Index)             78 95% 28.45 kg/m2          Blood Pressure from Last 3 Encounters:   06/30/17 118/74   06/16/17 (!) 88/48   06/07/17 112/74    Weight from Last 3 Encounters:   06/30/17 92.5 kg (204 lb)   06/16/17 92.5 kg (204 lb)   05/25/17 93.4 kg (206 lb)              We Performed the Following     Drug Screen Comprehensive , Urine with Reported Meds (Pain Care Package)        Primary Care  Provider Office Phone # Fax #    Priscilla Murphy [phone]       [facility] [address] [place] [address]        Equal Access to Services     PA DUMONT : Hadii em johnson ronio Soashali, waaxda luqadaha, qaybta kaalmada adephyllis, jamel tineo laChynaan crouch. So [facility] [phone].    ATENCIÓN: Si habla español, tiene a yanez disposición servicios gratuitos de asistencia lingüística. [facility] [phone].    We comply with applicable federal civil rights laws and [place] laws. We do not discriminate on the basis of race, color, national origin, age, disability sex, sexual orientation or gender identity.            Thank you!     Thank you for choosing [facility]  for your care. Our goal is always to provide you with excellent care. Hearing back from our patients is one way we can continue to improve our services. Please take a few minutes to complete the written survey that you may receive in the mail after your visit with us. Thank you!             Your Updated Medication List - Protect others around you: Learn how to safely use, store and throw away your medicines at www.disposemymeds.org.          This list is accurate as of: 6/30/17  4:52 PM.  Always use your most recent med list.                   Brand Name Dispense Instructions for use Diagnosis    ALPRAZolam 0.5 MG tablet    XANAX    60 tablet    Take 1 tablet (0.5 mg) by mouth 2 times daily as needed for anxiety    BRIDGER (generalized anxiety disorder)       atorvastatin 40 MG tablet    LIPITOR    90 tablet    Take 1 tablet (40 mg) by mouth daily    Hyperlipidemia LDL goal <160       buprenorphine HCl-naloxone HCl 8-2 MG per film    SUBOXONE    120 Film    Place 1 Film under the tongue every 6 hours    Chronic pain syndrome, Continuous opioid dependence (H)       dexamethasone 4 MG/ML injection    DECADRON    1 mL    Inject 1 mL (4 mg) as directed once for 1 dose    Primary  osteoarthritis of left knee       * ESCITALOPRAM OXALATE PO      Take 20 mg by mouth daily        * escitalopram 20 MG tablet    LEXAPRO    90 tablet    Take 1 tablet (20 mg) by mouth daily    BRIDGER (generalized anxiety disorder)       esomeprazole 20 MG CR capsule    nexIUM    30 capsule    Take 1 capsule (20 mg) by mouth every morning (before breakfast) Take 30-60 minutes before eating.        lidocaine 1 % injection     3 mL    3 mLs by INTRA-ARTICULAR route once for 1 dose    Primary osteoarthritis of left knee       methocarbamol 500 MG tablet    ROBAXIN    240 tablet    Take 1-2 tablets (500-1,000 mg) by mouth 4 times daily as needed for muscle spasms    Back muscle spasm       methylPREDNISolone acetate 40 MG/ML injection    DEPO-MEDROL    1 mL    1 mL (40 mg) by INTRA-ARTICULAR route once for 1 dose    Primary osteoarthritis of left knee       omega 3 1000 MG Caps     90 capsule    Take 1 g by mouth daily        * Notice:  This list has 2 medication(s) that are the same as other medications prescribed for you. Read the directions carefully, and ask your doctor or other care provider to review them with you.

## 2017-06-30 NOTE — NURSING NOTE
"Chief Complaint   Patient presents with     Pain       Initial /74  Pulse 78  Wt 92.5 kg (204 lb)  SpO2 95%  BMI 28.45 kg/m2 Estimated body mass index is 28.45 kg/(m^2) as calculated from the following:    Height as of 6/16/17: 1.803 m (5' 11\").    Weight as of this encounter: 92.5 kg (204 lb).      Patient prefers to be contacted via at Home.   Okay to leave detailed message: Yes  Patient uses MyChart: Yes    Minoo CURRAN LPN    "

## 2017-06-30 NOTE — PATIENT INSTRUCTIONS
----------------------------------------------------------------  Nurse Triage line:  [phone]   Call this number with any questions or concerns. You may leave a detailed message anytime. Calls are typically returned Monday through Friday between 8 AM and 4:30 PM. We usually get back to you within 2 business days depending on the issue/request.       Medication refills:    For non-narcotic medications, call your pharmacy directly to request a refill. The pharmacy will contact the Pain Management Center for authorization. Please allow 3-4 days for these refills to be processed.     For narcotic refills, call the nurse triage line or send a [employer] message. Please contact us 7-10 days before your refill is due. The message MUST include the name of the specific medication(s) requested and how you would like to receive the prescription(s). The options are as follows:    Pain Clinic staff can mail the prescription to your pharmacy. Please tell us the name of the pharmacy.    You may pick the prescription up at the Pain Clinic (tell us the location) or during a clinic visit with your pain provider    Pain Clinic staff can deliver the prescription to the [facility] pharmacy in the clinic building. Please tell us the location.      Scheduling number: [phone].  Call this number to schedule or change appointments.    We believe regular attendance is key to your success in our program.    Any time you are unable to keep your appointment we ask that you call us at least 24 hours in advance to let us know. This will allow us to offer the appointment time to another patient.

## 2017-06-30 NOTE — PROGRESS NOTES
"                          [facility]    Date of visit: 6/30/2017    Chief complaint:   Chief Complaint   Patient presents with     Pain     Interval history:  Diego Meng is a 41 year old male last seen by Dr. Almonte on 6/7/17.      Recommendations/plan at the last visit included:   Plan:  1. Medication Management: UDS with expected and appropriate results and opioid agreement on 11/2/17  -Start oxycodone-acetaminophen  mg max #2/day and oxycontin 10 mg every 12 hours while participating in PT and then consider going back to subuxone. Absolutely no dose escalations for the chronic pain.  -Robaxin 500-1000 mg four times a day as needed.  2. Follow up: with Dr. Reba Vallecillo scheduled for 6/30/17.  3. Referrals: neurosurgery  4. Follow up with Dr. Kaufman regarding knee pain -scheduled for 6/12/17.  5. Schedule with Sarah for PT.      Since his last visit, Diego Meng reports:  Brief history: Began having low back pain at age 20.  Pain continued and progressed to neck and right shoulder pain for the last 20 years.  Began taking high dose opioids after having a L4-5 decompression at [facility] by Dr. Rios. Dose escalated and got \"out of control\" and he elected to transition to first the butrans patch and then suboxone about a year ago.  He has been on and off this over this time period.  Recently was transitioned back to oxycodone and overused.     -Currently debating having a fusion with Dr. Rios and would like to get a second opinion with Dr. Ayala.   -Making a follow up with sports medicine - Dr. Kaufman.  He has a torn ligament in his left knee and will likely need surgery for that as well. He had a knee injection a month ago which was not helpful.   -Trigger point injections (lumbar spine) with Dr. Almonte did not provide relief.   - Robaxin is helpful in providing relief of the muscle spasm and he is able to stand up straighter.  -Has been taking suboxone 8-2 " mg #4/day  Has been on this for a couple months and thinks it is more effective for his pain.    -Lumbar pain is the worst area, left side, radiates to the posterolateral hip.  The pain is aggravated by sitting and standing. His low back pain also is radiating down the right posterior thigh and leg to the heel. The pain is much worse in his back than in his leg. The right leg gives way at times. Denies any numbness but is having some tingling.  -He has not scheduled with Dr. Benitez yet. He reports that his anxiety has settled down since getting a new job 3 months ago and he does not need pain psychology  -His headaches are unchanged. They start at the back of his head/neck and radiate through his head to his eyes.  He attributes his headaches to rebound and has a very difficult time discontinuing excedrin.     Pain scores:  Pain intensity on average is 8 (8 last visit) on a scale of 0-10.     Current pain treatments:   suboxone 8-2 mg #4/day  excedrin #2-6/day for 20 years  lexapro 20 mg daily  Clonazepam 1 mg #1-2/day  Xanax 0.5mg PRN BID  Robaxin 500-1000mg PRN QID    Side Effects: no side effect    THE 4 A's OF OPIOID MAINTENANCE ANALGESIA    Analgesia:minimal    Activity: less    Adverse effects: none    Adherence to Rx protocol: good      Past pain treatments:  Diego GALDAMEZ Yusra has been seen at a pain clinic in the past. [facility] Dr. Vázquez and [facility]  Medications:     Medrol dose carolin -no relief              botox -no relief              Anti-migraine: fioricet, migranal, eletriptan, frovatriptan, midrin, naraptriptan, sumatriptan, zolmitripatn -no relief from any of these              Anti-convulsants: depakote, topiramate, gabapentin, lyrica -all of these made him feel fuzzy and forgetful and they were not helpful              Opioids: oxycontin, morphine -no relief, hydrocodone -no relief, tramadol -no relief                Muscle relaxants: soma -no relief, flexeril -no relief and  made him tired, norflex -no relief, tizanidine -no relief and made him tired              NSAIDs: allergic to this class of medications -rash, abdominal pain and cramping, toradol -upset stomach              Anti-depressants: cymbalta -no relief                Topicals: lidocaine -no relief  PT: [facility] in 2016 -helped for shoulder but flared up back and neck  Psychology: yes for pain at the [facility] -'continues to use these skills'  Acupuncture: tried it -no relief  Chiropractic care: tried it -helped initially but then flared up pain  TENS Unit: no relief  Injections: R L4 and L5 TF BAY 12/24/16 -flared pain, cervical epidural steroid injection -provided 2-4 weeks of relief but had significant flare up for 2 weeks post-procedure, cervical trigger point injections -minimal relief, lumbar medial branch block -no relief, lumbar paraspinal trigger point injection 5/17/17  Surgery: right L4-L5 hemilaminectomy May 2015 Dr. Rios    Medications:  Current Outpatient Prescriptions   Medication Sig Dispense Refill     ALPRAZolam (XANAX) 0.5 MG tablet Take 1 tablet (0.5 mg) by mouth 2 times daily as needed for anxiety 60 tablet 0     escitalopram (LEXAPRO) 20 MG tablet Take 1 tablet (20 mg) by mouth daily 90 tablet 1     atorvastatin (LIPITOR) 40 MG tablet Take 1 tablet (40 mg) by mouth daily 90 tablet 1     buprenorphine HCl-naloxone HCl (SUBOXONE) 8-2 MG per film Place 1 Film under the tongue every 6 hours 120 Film 0     esomeprazole (NEXIUM) 20 MG capsule Take 1 capsule (20 mg) by mouth every morning (before breakfast) Take 30-60 minutes before eating. 30 capsule 0     omega 3 1000 MG CAPS Take 1 g by mouth daily 90 capsule      lidocaine 1 % injection 3 mLs by INTRA-ARTICULAR route once for 1 dose 3 mL 0     dexamethasone (DECADRON) 4 MG/ML injection Inject 1 mL (4 mg) as directed once for 1 dose 1 mL 0     methylPREDNISolone acetate (DEPO-MEDROL) 40 MG/ML injection 1 mL (40  mg) by INTRA-ARTICULAR route once for 1 dose 1 mL 0     methocarbamol (ROBAXIN) 500 MG tablet Take 1-2 tablets (500-1,000 mg) by mouth 4 times daily as needed for muscle spasms (Patient not taking: Reported on 6/16/2017) 240 tablet 3     ESCITALOPRAM OXALATE PO Take 20 mg by mouth daily         Medical History: any changes in medical history since they were last seen? no    Review of Systems:  The 14 system ROS was reviewed from the intake questionnaire, and is positive for: headache  Any bowel or bladder problems: none  Mood: anxiety, stress    Physical Exam:  Blood pressure 118/74, pulse 78, weight 92.5 kg (204 lb), SpO2 95 %.  General: no acute distress, pleasant, sitting comfortably but leaning to the left  Gait: antalgic  MSK: full strength but with some guarding due to pain    Imaging:  Lumbar MRI completed on 4/24/17:  FINDINGS: Five lumbar vertebrae are assumed. Degenerative loss of disc  signal with relatively normal disc height is noted at L4-L5 and L5-S1.  Minimal discogenic endplate reactive marrow edema is noted posteriorly  at L4-L5 as well. Apophyseal joints are essentially within normal  limits. Vertebral body heights and sagittal alignment are within  normal limits. The conus medullaris is unremarkable in appearance on  the sagittal images.      T12-L1, L1-L2, L2-L3, L3-L4: Normal.     L4-L5: Mild annular bulging and endplate spurring with mild foraminal  narrowing below the exiting nerve roots. Posterior high signal annular  fissuring with minimal central disc protrusion. No central stenosis.     L5-S1: Small 0.3 cm caudally dissecting disc extrusion to the left of  midline, similar in appearance to 3/10/2015. This does not indent the  thecal sac or compress/displace the left S1 nerve root. No abnormal  enhancement. No central or foraminal stenosis.         IMPRESSION:  1. L5-S1 small caudally dissecting 0.3 cm disc extrusion slightly to  the left of midline without apparent neural impingement or  stenosis.  2. L4-L5 small central disc protrusion, without apparent neural  impingement or central stenosis, and mild bilateral foraminal  stenosis.    Lumbar MRI completed on 8/1/15 showed:  CONCLUSION:    1. Interval right hemilaminotomy and partial facetectomy at L4-L5. No recurrent disc herniation.  2. Other minor degenerative changes. Annular fissures L4-L5 and L5-S1. No high-grade central canal or foraminal stenosis at any level.    Cervical spine MRI completed on 11/14/14:  FINDINGS: Cervical spine visualized through the upper thoracic spine. Lordosis normal. Slight retrolisthesis at each level between C3 and C6. Vertebral body heights are normal. Marrow signal is normal, including the facets. Paraspinous soft tissues are unremarkable. Visible posterior fossa contents are unremarkable. Cord signal and morphology is normal.  CRANIOCERVICAL JUNCTION: Mild anterior spurring at C1-C2. Foramen magnum and central canal are adequate.  C2-C3: Disc height normal. Mild facet hypertrophy. No central canal or foraminal stenosis.  C3-C4: Disc height normal. Slight right uncinate hypertrophy. Minimal facet hypertrophy. No significant central canal or foraminal stenosis.    C4-C5: Slight disc height loss. Mild bilateral facet hypertrophy. No central canal or foraminal stenosis.  C5-C6: Mild disc height loss with left eccentric disc osteophyte complex. Mild bilateral facet hypertrophy. No central canal or right foraminal stenosis. Moderate left foraminal stenosis.    C6-C7: Slight disc height loss. Mild facet hypertrophy. No central canal or foraminal stenosis.  C7-T1: Mild disc height loss. Mild to moderate bilateral facet hypertrophy. No central canal or foraminal stenosis.  CONCLUSION:  1. Moderate left foraminal stenosis at C5-C6.  2. Other relatively minor degenerative changes, no further high-grade central canal or foraminal stenosis.     Right shoulder x-ray completed on 4/2/15:  FINDINGS: No evidence of fracture or  dislocation. Normal glenohumeral joint alignment without evidence of degenerative changes. No evidence of significant acromioclavicular joint degenerative joint disease. No acromial or coracoid enthesophyte. Mild undersurface acromial sclerosis. Downslope type three acromion. No blastic or lytic lesions.  IMPRESSION:  Right shoulder type three acromion otherwise within normal limits.     [place] Board of Pharmacy Data Base Reviewed: YES; as expected, no concern for abuse or misuse    Assessment:   1. Chronic pain syndrome  2. Opioid dependency  3. Chronic low back pain. Right L4-L5 hemilaminectomy partial facetectomy in May 2015 with Dr. Rios. Now with right sided radicular symptoms and left-sided lumbar spasms  4. Chronic neck pain. Cervical facet arthropathy.  5. Chronic headaches. Likely related to facet arthropathy, psychogenic, and rebound (excedrin)  6. Anxiety with panic attacks. Takes lexapro and clonazepam. Stable.  7. Tobacco use disorder  8. Chronic bilateral knee pain. Left knee meniscus repair on 11/28/16 with Dr. Kaufman.    9. Chronic right shoulder pain.  10. No self cares. Reliant on medication.   11. Working full time as an .     Plan:  1. Pain Psychology - schedule with Jaye, he is reluctant to do this and states he has done this at [facility] and they discharged him as stable.   2. PT - YES, however he does not feel he needs this  3. Medication Management:    1. UDS and opioid agreement - YES, today   2. Suboxone 8mg films - QID - this is a very high dose.  I explained to him that this could in fact be making him feel worse and there is an optimal dose for pain for each individual which is likely much lower than what he is on.  I suggested he decrease his dose to 16mg/day divided 4 times daily.  He has agreed to try this.  He is very focused on medication management of his pain and is in search of another medication to take in addition to this for pain.  I explained physical  tolerance and chronic pain in detail and the fact that he needs to utilize other modalities to treat his pain.     3. Robaxin 500mg 1-2 PRN QID  4. Referrals - none  5. Follow up with Dr. Valleclilo in 4-8 weeks    Total time spent was 60 minutes, and more than 50% of face to face time was spent in counseling and/or coordination of care regarding the above assessment and plan.    Reba Vallecillo MD [facility]  [facility]

## 2017-06-30 NOTE — LETTER
[facility] [place]    06/30/17    Patient: Diego Meng  YOB: 1975  Medical Record Number: [id number]                                                                  Controlled Substance Agreement  I understand that my care provider has prescribed controlled substances (narcotics, tranquilizers, and/or stimulants) to help manage my condition(s).  I am taking this medicine to help me function or work.  I know that this is strong medicine.  It could have serious side effects and even cause a dependency on the drug.  If I stop these medicines suddenly, I could have severe withdrawal symptoms.    The risks, benefits, and side effects of these medication(s) were explained to me.  I agree that:  1. I will take part in other treatments as advised by my provider.  This may be psychiatry or counseling, physical therapy, behavioral therapy, group treatment, or a referral to a pain clinic.  I will reduce or stop my medicine when my provider tells me to do so.   2. I will take my medicines as prescribed.  I will not change the dose or schedule unless my provider tells me to.  There will be no refills if I  run out early.   I may be contacted at any time without warning and asked to complete a drug test or pill count.   3. I will keep all my appointments at the clinic.  If I miss appointments or fail to follow instructions, my provider may stop my medicine.  4. I will not ask other providers to prescribe controlled substances. And I will not accept controlled substances from other people. If I need another prescribed controlled substance for a new reason, I will notify my provider within one business day.  5. If I enroll in the [place] Medical Marijuana program, I will tell my provider.  I will also sign an agreement to share my medical records with my provider.  6. I will use one pharmacy to fill all of my controlled substance prescriptions.  If my prescription is mailed to my  pharmacy, it may take 5 to 7 days for my medicine to be ready.  7. I understand that my provider, clinic care team, and pharmacy can track controlled substance prescriptions from other providers through a central database (prescription monitoring program).  8. I will bring in my list of medications (or my medicine bottles) each time I come to the clinic.  REV-  04/2016                                                                                                                                            Page 1 of 2      [facility] [place]    06/30/17    Patient: Diego Meng  YOB: 1975  Medical Record Number: [id number]    9. Refills of controlled substances will be made only during office hours.  It is up to me to make sure that I do not run out of my medicines on weekends or holidays.    10. I am responsible for my prescriptions.  If the medicine is lost or stolen, it will not be replaced.   I also agree not to share these medicines with anyone.  11. I agree to not use ANY illegal or recreational drugs.  This includes marijuana, cocaine, bath salts or other drugs.  I agree not to use alcohol unless my provider says I may.  I agree to give urine samples whenever asked.  If I fail to give a urine sample, the provider may stop my medicine.     12. I will tell my nurse or provider right away if I become pregnant or have a new medical problem treated outside of [facility].  13. I understand that this medicine can affect my thinking and judgment.  It may be unsafe for me to drive, use machinery and do dangerous tasks.  I will not do any of these things until I know how the medicine affects me.  If my dose changes, I will wait to see how it affects me.  I will contact my provider if I have concerns about medicine side effects.  I understand that if I do not follow any of the conditions above, my prescriptions or treatment may be stopped.    I agree that my provider,  clinic care team, and pharmacy may work with any city, state or federal law enforcement agency that investigates the misuse, sale, or other diversion of my controlled medicine. I will allow my provider to discuss my care with or share a copy of this agreement with any other treating provider, pharmacy or emergency room where I receive care.  I agree to give up (waive) any right of privacy or confidentiality with respect to these authorizations.   I have read this agreement and have asked questions about anything I did not understand.   ___________________________________    ___________________________  Patient Signature                                                           Date and Time  ___________________________________     ____________________________  Witness                                                                            Date and Time  ___________________________________  Reba Vallecillo MD  REV-  04/2016                                                                                                                                                                 Page 2 of 2  Opioid Pain Medicines (also known as Narcotics)  What You Need to Know      What are opioids?   Opioids are pain medicines that must be prescribed by a doctor. Examples are:     morphine (MS Contin, Molly)    oxycodone (Oxycontin)    oxycodone and acetaminophen (Percocet)    hydrocodone and acetaminophen (Vicodin, Norco)     fentanyl patch (Duragesic)     hydromorphone (Dilaudid)     methadone     What do opioids do well?   Opioids are best for short-term pain after a surgery or injury. They also work well for cancer pain. Unlike other pain medicines, they do not cause liver or kidney failure or ulcers. They may help some people with long-lasting (chronic) pain.     What do opioids NOT do well?   Opioids never get rid of pain entirely, and they do not work well for most patients with chronic pain. Opioids do not reduce  swelling, one of the causes of pain. They also don t work well for nerve pain.     Side effects  Talk to your doctor before you start or decide to keep taking one of these medicines. Side effects include:    Lowers your breathing rate enough that it could cause death    Death due to taking more than the prescribed dose    Serious lifelong opioid use      Dependence is not the same as addiction. Addiction is when people keep using a substance that harms their body, their mind or their relations with others. If you have a history of drug or alcohol abuse, taking opioids can cause a relapse.  Over time, opioids don t work as well. Most people will need higher and higher doses. The higher the dose, the more serious the side effects. We don t know the long-term effects of opioids.   People who have used opioids for a long time have a lower quality of life, worse depression, higher levels of pain and more visits to doctors.  Overdose from prescription drugs is the second leading cause of death in the [place] The risk of overdose rises when opioids are taken with other drugs such as:    Medicines used for anxiety and panic attacks (such as lorazepam, alprazolam, clonazepam    Other sedatives    Alcohol    Illegal drugs such as heroin  Never share your opioids with others. Be sure to store opioids in a secure place, locked if possible.Young children can easily swallow them and overdose.     Are there other ways to manage pain?  Ways to help reduce pain:    Exercise every day.    Treat health problems that may be causing pain.    Treat mental health problems like depression and anxiety.     Worse depression symptoms; Less pleasure in things you usually enjoy    Feeling tired or sluggish    Slower thoughts or cloudy thinking    Being more sensitive to pain over time; Pain is harder to control.    Trouble sleeping or restless sleep    Changes in hormone levels (for example, less testosterone).     Changes in sex drive or ability  to have sex    Long lasting nausea and constipation    Trouble breathing while asleep; This is worse with lung problems like COPD or sleep apnea.    Unsafe driving    Getting sick more often    Itching    Feeling dizzy    Dry mouth    Sweating    Trouble emptying the bladder (peeing). This is worse if you have an enlarged prostate or get urinary tract infections (UTIs).    What else should I know about opioids?  When someone takes opioids for too long or too often, they become dependent. This means that if you stop or reduce the medicine too quickly, you will have withdrawal symptoms.          Practice good sleep habits.  Try to go to bed and get up at the same time every day.    Stop smoking.  Tobacco use can make pain worse.    Do things that you enjoy.    Find a way to work through pain without drugs.  Try deep breathing, meditation, visual imagery and aromatherapy.    Ask your doctor to help you create a plan to manage your pain.

## 2017-06-30 NOTE — NURSING NOTE
Obtained urine specimen.  Tracking number [id number], temp 95 degrees.  Specimen sent to the lab.  Reviewed opioid agreement with patient and the patient stated understanding.  Patient signed agreement and a copy was given to pt.     Clonazapam-last taken 2 weeks ago, switched to Xanax  When asked about Oxycodone and Oxycontin in his med list he states it was about a month ago that he took these. See previous TE's (DOS 06/12 and 06/13) regarding these  medications.   Trena MARCN-RN Care Coordinator  [facility]

## 2017-06-30 NOTE — LETTER
[facility] [place]    06/30/17    Patient: Diego Meng  YOB: 1975  Medical Record Number: [id number]                                                                  Controlled Substance Agreement  I understand that my care provider has prescribed controlled substances (narcotics, tranquilizers, and/or stimulants) to help manage my condition(s).  I am taking this medicine to help me function or work.  I know that this is strong medicine.  It could have serious side effects and even cause a dependency on the drug.  If I stop these medicines suddenly, I could have severe withdrawal symptoms.    The risks, benefits, and side effects of these medication(s) were explained to me.  I agree that:  1. I will take part in other treatments as advised by my provider.  This may be psychiatry or counseling, physical therapy, behavioral therapy, group treatment, or a referral to a pain clinic.  I will reduce or stop my medicine when my provider tells me to do so.   2. I will take my medicines as prescribed.  I will not change the dose or schedule unless my provider tells me to.  There will be no refills if I  run out early.   I may be contacted at any time without warning and asked to complete a drug test or pill count.   3. I will keep all my appointments at the clinic.  If I miss appointments or fail to follow instructions, my provider may stop my medicine.  4. I will not ask other providers to prescribe controlled substances. And I will not accept controlled substances from other people. If I need another prescribed controlled substance for a new reason, I will notify my provider within one business day.  5. If I enroll in the [place] Medical Marijuana program, I will tell my provider.  I will also sign an agreement to share my medical records with my provider.  6. I will use one pharmacy to fill all of my controlled substance prescriptions.  If my prescription is mailed to my  pharmacy, it may take 5 to 7 days for my medicine to be ready.  7. I understand that my provider, clinic care team, and pharmacy can track controlled substance prescriptions from other providers through a central database (prescription monitoring program).  8. I will bring in my list of medications (or my medicine bottles) each time I come to the clinic.  REV-  04/2016                                                                                                                                            Page 1 of 2      [facility]    06/30/17    Patient: Diego Meng  YOB: 1975  Medical Record Number: [id number]    9. Refills of controlled substances will be made only during office hours.  It is up to me to make sure that I do not run out of my medicines on weekends or holidays.    10. I am responsible for my prescriptions.  If the medicine is lost or stolen, it will not be replaced.   I also agree not to share these medicines with anyone.  11. I agree to not use ANY illegal or recreational drugs.  This includes marijuana, cocaine, bath salts or other drugs.  I agree not to use alcohol unless my provider says I may.  I agree to give urine samples whenever asked.  If I fail to give a urine sample, the provider may stop my medicine.     12. I will tell my nurse or provider right away if I become pregnant or have a new medical problem treated outside of [facility].  13. I understand that this medicine can affect my thinking and judgment.  It may be unsafe for me to drive, use machinery and do dangerous tasks.  I will not do any of these things until I know how the medicine affects me.  If my dose changes, I will wait to see how it affects me.  I will contact my provider if I have concerns about medicine side effects.  I understand that if I do not follow any of the conditions above, my prescriptions or treatment may be stopped.    I agree that my provider,  clinic care team, and pharmacy may work with any city, state or federal law enforcement agency that investigates the misuse, sale, or other diversion of my controlled medicine. I will allow my provider to discuss my care with or share a copy of this agreement with any other treating provider, pharmacy or emergency room where I receive care.  I agree to give up (waive) any right of privacy or confidentiality with respect to these authorizations.   I have read this agreement and have asked questions about anything I did not understand.   ___________________________________    ___________________________  Patient Signature                                                           Date and Time  ___________________________________     ____________________________  Witness                                                                            Date and Time  ___________________________________  Reba Vallecillo MD  REV-  04/2016                                                                                                                                                                 Page 2 of 2

## 2017-07-05 ENCOUNTER — OFFICE VISIT (OUTPATIENT)
Dept: PEDIATRICS | Facility: CLINIC | Age: 42
End: 2017-07-05
Payer: COMMERCIAL

## 2017-07-05 VITALS
BODY MASS INDEX: 28.31 KG/M2 | RESPIRATION RATE: 16 BRPM | WEIGHT: 203 LBS | HEART RATE: 71 BPM | DIASTOLIC BLOOD PRESSURE: 72 MMHG | OXYGEN SATURATION: 96 % | SYSTOLIC BLOOD PRESSURE: 118 MMHG

## 2017-07-05 DIAGNOSIS — F41.1 GAD (GENERALIZED ANXIETY DISORDER): ICD-10-CM

## 2017-07-05 DIAGNOSIS — F41.0 PANIC ATTACK: ICD-10-CM

## 2017-07-05 PROCEDURE — 99214 OFFICE O/P EST MOD 30 MIN: CPT | Performed by: PEDIATRICS

## 2017-07-05 RX ORDER — CLONAZEPAM 1 MG/1
1 TABLET ORAL 4 TIMES DAILY
Qty: 120 TABLET | Refills: 0 | Status: SHIPPED | OUTPATIENT
Start: 2017-07-05 | End: 2017-08-07

## 2017-07-05 NOTE — PROGRESS NOTES
SUBJECTIVE:                                                    Diego Meng is a 41 year old male who presents to clinic today for the following health issues:    Medication Followup of Xanax    Taking Medication as prescribed: yes    Side Effects:  None    Medication Helping Symptoms:  NO- anxiety is back big time, boarderline panic attacks    Would like to switch back to old medication     Patient switching jobs to a larger [employer] and he will be their  - good for his career, but very anxiety provoking.  Clonazepam made him groggy previously - was taking 1mg QID.     Problem list and histories reviewed & adjusted, as indicated.  Additional history: as documented        Reviewed and updated as needed this visit by clinical staff       Reviewed and updated as needed this visit by Provider         ROS:  Constitutional, HEENT, cardiovascular, pulmonary, gi and gu systems are negative, except as otherwise noted.    OBJECTIVE:     /72 (BP Location: Right arm, Patient Position: Chair, Cuff Size: Adult Regular)  Pulse 71  Resp 16  Wt 203 lb (92.1 kg)  SpO2 96%  BMI 28.31 kg/m2  Body mass index is 28.31 kg/(m^2).  NA    Diagnostic Test Results:  none     ASSESSMENT/PLAN:       1. Panic attack  Will switch back to klonopin - discussed taking only TID or not taking full tab at night, trying only 1/2 tablet. Also he will consider buspar in the future.  We also discussed eventually doing a long taper off of these in the future.   - clonazePAM (KLONOPIN) 1 MG tablet; Take 1 tablet (1 mg) by mouth 4 times daily  Dispense: 120 tablet; Refill: 0    2. BRIDGER (generalized anxiety disorder)  As above.   - clonazePAM (KLONOPIN) 1 MG tablet; Take 1 tablet (1 mg) by mouth 4 times daily  Dispense: 120 tablet; Refill: 0    Patient Instructions   Stop the xanax.    Restart clonazpam as previously prescribed - consider not taking bedtime dose or try only 1/2 tab before bed for less AM  drowsiness.    FOLLOW UP in 6 months.      Priscilla Murphy MD  [facility]

## 2017-07-05 NOTE — MR AVS SNAPSHOT
After Visit Summary   7/5/2017    Diego Meng    MRN: [id number]           Patient Information     Date Of Birth          1975        Visit Information        Provider Department      7/5/2017 8:00 AM Priscilla Murphy MD [facility]        Today's Diagnoses     Panic attack        BRIDGER (generalized anxiety disorder)          Care Instructions    Stop the xanax.    Restart clonazpam as previously prescribed - consider not taking bedtime dose or try only 1/2 tab before bed for less AM drowsiness.    FOLLOW UP in 6 months.          Follow-ups after your visit        Who to contact     If you have questions or need follow up information about today's clinic visit or your schedule please contact [facility] directly at [phone].  Normal or non-critical lab and imaging results will be communicated to you by MyChart, letter or phone within 4 business days after the clinic has received the results. If you do not hear from us within 7 days, please contact the clinic through [employer] or phone. If you have a critical or abnormal lab result, we will notify you by phone as soon as possible.  Submit refill requests through [employer] or call your pharmacy and they will forward the refill request to us. Please allow 3 business days for your refill to be completed.          Additional Information About Your Visit        MyChart Information     [employer] gives you secure access to your electronic health record. If you see a primary care provider, you can also send messages to your care team and make appointments. If you have questions, please call your primary care clinic.  If you do not have a primary care provider, please call [phone] and they will assist you.        Care EveryWhere ID     This is your Care EveryWhere ID. This could be used by other organizations to access your [place] medical records  [id number]        Your Vitals Were     Pulse Respirations Pulse  Oximetry BMI (Body Mass Index)          71 16 96% 28.31 kg/m2         Blood Pressure from Last 3 Encounters:   07/05/17 118/72   06/30/17 118/74   06/16/17 (!) 88/48    Weight from Last 3 Encounters:   07/05/17 203 lb (92.1 kg)   06/30/17 204 lb (92.5 kg)   06/16/17 204 lb (92.5 kg)              Today, you had the following     No orders found for display         Today's Medication Changes          These changes are accurate as of: 7/5/17  8:30 AM.  If you have any questions, ask your nurse or doctor.               Start taking these medicines.        Dose/Directions    clonazePAM 1 MG tablet   Commonly known as:  klonoPIN   Used for:  Panic attack, BRIDGER (generalized anxiety disorder)   Started by:  Priscilla Murphy MD        Dose:  1 mg   Take 1 tablet (1 mg) by mouth 4 times daily   Quantity:  120 tablet   Refills:  0            Where to get your medicines      Some of these will need a paper prescription and others can be bought over the counter.  Ask your nurse if you have questions.     Bring a paper prescription for each of these medications     clonazePAM 1 MG tablet                Primary Care Provider Office Phone # Fax #    Priscilla Murphy [phone]       [address] [place] [address]        Equal Access to Services     PA DUMONT AH: Hadii em ku hadasho Soomaali, waaxda luqadaha, qaybta kaalmada adeegyada, waxay crowin haybraydenn maurice crouch. So [facility] [phone].    ATENCIÓN: Si habla español, tiene a yanez disposición servicios gratuitos de asistencia lingüística. Llame al [phone].    We comply with applicable federal civil rights laws and [place] laws. We do not discriminate on the basis of race, color, national origin, age, disability sex, sexual orientation or gender identity.            Thank you!     Thank you for choosing [facility]  for your care. Our goal is always to provide you with excellent care. Hearing  back from our patients is one way we can continue to improve our services. Please take a few minutes to complete the written survey that you may receive in the mail after your visit with us. Thank you!             Your Updated Medication List - Protect others around you: Learn how to safely use, store and throw away your medicines at www.disposemymeds.org.          This list is accurate as of: 7/5/17  8:30 AM.  Always use your most recent med list.                   Brand Name Dispense Instructions for use Diagnosis    ALPRAZolam 0.5 MG tablet    XANAX    60 tablet    Take 1 tablet (0.5 mg) by mouth 2 times daily as needed for anxiety    BRIDGER (generalized anxiety disorder)       atorvastatin 40 MG tablet    LIPITOR    90 tablet    Take 1 tablet (40 mg) by mouth daily    Hyperlipidemia LDL goal <160       buprenorphine HCl-naloxone HCl 8-2 MG per film    SUBOXONE    120 Film    Place 1 Film under the tongue every 6 hours    Chronic pain syndrome, Continuous opioid dependence (H)       clonazePAM 1 MG tablet    klonoPIN    120 tablet    Take 1 tablet (1 mg) by mouth 4 times daily    Panic attack, BRIDGER (generalized anxiety disorder)       escitalopram 20 MG tablet    LEXAPRO    90 tablet    Take 1 tablet (20 mg) by mouth daily    BRIDGER (generalized anxiety disorder)       esomeprazole 20 MG CR capsule    nexIUM    30 capsule    Take 1 capsule (20 mg) by mouth every morning (before breakfast) Take 30-60 minutes before eating.        methocarbamol 500 MG tablet    ROBAXIN    240 tablet    Take 1-2 tablets (500-1,000 mg) by mouth 4 times daily as needed for muscle spasms    Back muscle spasm       omega 3 1000 MG Caps     90 capsule    Take 1 g by mouth daily

## 2017-07-05 NOTE — NURSING NOTE
"Chief Complaint   Patient presents with     Recheck Medication       Initial /72 (BP Location: Right arm, Patient Position: Chair, Cuff Size: Adult Regular)  Pulse 71  Resp 16  Wt 203 lb (92.1 kg)  SpO2 96%  BMI 28.31 kg/m2 Estimated body mass index is 28.31 kg/(m^2) as calculated from the following:    Height as of 6/16/17: 5' 11\" (1.803 m).    Weight as of this encounter: 203 lb (92.1 kg).  Medication Reconciliation: complete  Kayy Olivares CMA  "

## 2017-07-05 NOTE — PATIENT INSTRUCTIONS
Stop the xanax.    Restart clonazpam as previously prescribed - consider not taking bedtime dose or try only 1/2 tab before bed for less AM drowsiness.    FOLLOW UP in 6 months.

## 2017-07-06 ENCOUNTER — TELEPHONE (OUTPATIENT)
Dept: PALLIATIVE MEDICINE | Facility: CLINIC | Age: 42
End: 2017-07-06

## 2017-07-06 DIAGNOSIS — Z79.891 ENCOUNTER FOR LONG-TERM OPIATE ANALGESIC USE: ICD-10-CM

## 2017-07-06 NOTE — TELEPHONE ENCOUNTER
Received call from lab that UDS completed on 06/30 is no longer viable. Sample had been missed by transport  on date of collection. Dr. Vallecillo made aware of situation. Patient will need repeat UDS at follow up visit.   Will leave open to monitor for follow up to ensure repeat UDS completed.     Trena MARCN-RN Care Coordinator  [facility]

## 2017-07-13 NOTE — TELEPHONE ENCOUNTER
Chart check. No appt scheduled yet. Will leave encounter open for patient response/chart review by nursing.     Trena Washburn  BSN-RN Care Coordinator  [facility]

## 2017-07-14 DIAGNOSIS — G89.4 CHRONIC PAIN SYNDROME: ICD-10-CM

## 2017-07-14 DIAGNOSIS — F11.20 CONTINUOUS OPIOID DEPENDENCE (H): ICD-10-CM

## 2017-07-14 RX ORDER — BUPRENORPHINE AND NALOXONE 8; 2 MG/1; MG/1
1 FILM, SOLUBLE BUCCAL; SUBLINGUAL EVERY 6 HOURS
Qty: 120 FILM | Refills: 0 | Status: SHIPPED | OUTPATIENT
Start: 2017-07-14 | End: 2017-07-20

## 2017-07-14 NOTE — TELEPHONE ENCOUNTER
7/13 505pm    Patient LM requesting refill Suboxone. [phone]    Brigette Almonte    [facility]

## 2017-07-14 NOTE — TELEPHONE ENCOUNTER
Received call from patient requesting refill(s) of buprenorphine HCl-naloxone HCl (SUBOXONE) 8-2 MG per film     Last picked up from pharmacy on 6/14/2017    Pt last seen by prescribing provider on 6/30/2017 (Dr Vallecillo)  Next appt scheduled for none scheduled    Last urine drug screen date 11/2/2016  Current opioid agreement on file (completed within the last year) Yes Date of opioid agreement: 11/2/2016    Processing (pick one and delete the others):      Fax to:     [employer]   [address] [facility] [address]  Phone: [phone] Fax: [phone]        Will route to nursing [place] for review and preparation of prescription(s).

## 2017-07-14 NOTE — TELEPHONE ENCOUNTER
Routed to MA pool to gather required information for opioid refill.      Trena Washburn  BSN-RN Care Coordinator  [facility]

## 2017-07-14 NOTE — TELEPHONE ENCOUNTER
Routing to provider to review scripts prepped per below.   Upon confirmation call for script fax patient should be advised to make follow up visit as Dr. Vallecillo's schedule is filling fast.    Suboxone 8-2, #120, refill:NO  Sig:Ok to dispense 07/14/17 and start 07/14/17.   Last picked up 06/14/17   Due:07/14/17    Trena MARCN-RN Care Coordinator  [facility]

## 2017-07-14 NOTE — TELEPHONE ENCOUNTER
I have refilled his suboxone, however, I would like to cut down on his dose next month.  We did discuss this at his last visit.     Reba Valleicllo MD

## 2017-07-18 ENCOUNTER — TELEPHONE (OUTPATIENT)
Dept: PALLIATIVE MEDICINE | Facility: CLINIC | Age: 42
End: 2017-07-18

## 2017-07-18 NOTE — TELEPHONE ENCOUNTER
Patient calling to follow up on script, he states he has not heard from anyone regarding his Suboxone.       Mitzi MONTOYA    [place] [facility]

## 2017-07-19 NOTE — TELEPHONE ENCOUNTER
Verified with pharmacy they have RX on file, was sent 7/14/17. [facility] did say they showed inactive insurance, however. Notified Diego that pharmacy does have his next RX but they noted insurance problem. He is aware. He will let us know if any prior auth comes up.    Joana Christianson, MSN, RN-BC  Care Coordinator  [facility] [place]

## 2017-07-20 ENCOUNTER — TELEPHONE (OUTPATIENT)
Dept: PALLIATIVE MEDICINE | Facility: CLINIC | Age: 42
End: 2017-07-20

## 2017-07-20 DIAGNOSIS — F11.20 CONTINUOUS OPIOID DEPENDENCE (H): ICD-10-CM

## 2017-07-20 DIAGNOSIS — G89.4 CHRONIC PAIN SYNDROME: ICD-10-CM

## 2017-07-20 RX ORDER — BUPRENORPHINE AND NALOXONE 8; 2 MG/1; MG/1
1 FILM, SOLUBLE BUCCAL; SUBLINGUAL EVERY 6 HOURS
Qty: 60 FILM | Refills: 0 | Status: SHIPPED | OUTPATIENT
Start: 2017-07-20 | End: 2017-07-21

## 2017-07-20 NOTE — TELEPHONE ENCOUNTER
Rx faxed to:      [id number] - [place], [place] - [address] AT [place] [address]  [address] [facility] [address]  Phone: [phone] Fax: [phone]    Fax confirmation received. Hardcopy destroyed. Patient notified. [facility] advised to discard script for #120 strips.     Joana Gutierrez [facility]

## 2017-07-20 NOTE — TELEPHONE ENCOUNTER
"Spoke to patient and pharmacist. Insurance will cover 2 strips per day. Can you write out a new Rx for 60 strips?     Rx pended for 60 strips. Patient states he has \"weined himself down to 2 per day.\" Patient states he has 4 strips left.    Joana Gutierrez, [facility]      "

## 2017-07-20 NOTE — TELEPHONE ENCOUNTER
7/19 809pm    Patient LM stating [facility] needs a PA for quantity of Suboxone. Patient also stated he no longer needs 4 a day. And currently he has 5 strips left. [phone]    Brigette Almonte    [facility]

## 2017-07-20 NOTE — TELEPHONE ENCOUNTER
Patient calling, he is wondering if the quantity lowered so he does not need a PA. He is down to 2 strips a day, so he needs a quantity of 60 not 120, please call.       Mitzi MONTOYA    [place] [facility]

## 2017-07-21 ENCOUNTER — TELEPHONE (OUTPATIENT)
Dept: PALLIATIVE MEDICINE | Facility: CLINIC | Age: 42
End: 2017-07-21

## 2017-07-21 DIAGNOSIS — G89.4 CHRONIC PAIN SYNDROME: ICD-10-CM

## 2017-07-21 DIAGNOSIS — F11.20 CONTINUOUS OPIOID DEPENDENCE (H): ICD-10-CM

## 2017-07-21 RX ORDER — BUPRENORPHINE AND NALOXONE 8; 2 MG/1; MG/1
1 FILM, SOLUBLE BUCCAL; SUBLINGUAL EVERY 12 HOURS
Qty: 60 FILM | Refills: 0 | COMMUNITY
Start: 2017-07-21 | End: 2017-08-17

## 2017-07-21 NOTE — TELEPHONE ENCOUNTER
Called pharmacy. Was advised that insurance will not cover medication based on directions, not quantity. They will only cover q12h, not q6. Called patient and verified that he is taking one film Q12.   Routing to provider to review if ok to change sig to q12 instead of Q6h, nursing can call this into pharmacy if OK'ed by provider.     Trena MARCN-RN Care Coordinator  [facility]

## 2017-07-21 NOTE — TELEPHONE ENCOUNTER
Yes, it should be [id number].  Sorry I did not catch that.  If you could call it in I would appreciate it!     Reba Vallecillo MD

## 2017-07-21 NOTE — TELEPHONE ENCOUNTER
7/20 600pm    Patient LM stating the Suboxone prescription called in today wasn t correct. Exceeds daily limit. Insurance declined it. Need 2 per day, quantity 60. Please call patient with any questions. [phone]    Brigette Almonte    [facility]

## 2017-07-21 NOTE — TELEPHONE ENCOUNTER
Script changed as historical change. Routing for provider sign off. Pharmacy called to advise.     Trena MARCN-RN Care Coordinator  [place] [facility]

## 2017-07-25 NOTE — TELEPHONE ENCOUNTER
Called patient to encourage to schedule for follow up. Per last OV note follow up 4-8 weeks and provider is currently booked a few weeks out.   Scheduled for 08/17/17.  Appointment note added for collection of UDS    Trena MARCN-RN Care Coordinator  [facility]

## 2017-08-07 DIAGNOSIS — F41.1 GAD (GENERALIZED ANXIETY DISORDER): ICD-10-CM

## 2017-08-07 DIAGNOSIS — F41.0 PANIC ATTACK: ICD-10-CM

## 2017-08-07 NOTE — TELEPHONE ENCOUNTER
Klonopin      Last Written Prescription Date:  7/5/17  Last Fill Quantity: 120,   # refills: 0  Last Office Visit with [facility], [facility] or [facility] prescribing provider: 7/5/17  Future Office visit:    Next 5 appointments (look out 90 days)     Aug 17, 2017  8:00 AM CDT   Return Visit with Reba Vallecillo MD   [facility] ([facility])    [address] [address] [address]   [phone]                   Routing refill request to provider for review/approval because:  Drug not on the [facility], [facility] or [facility] refill protocol or controlled substance

## 2017-08-08 RX ORDER — CLONAZEPAM 1 MG/1
1 TABLET ORAL 4 TIMES DAILY
Qty: 120 TABLET | Refills: 0 | Status: SHIPPED | OUTPATIENT
Start: 2017-08-08 | End: 2017-09-05

## 2017-08-17 ENCOUNTER — OFFICE VISIT (OUTPATIENT)
Dept: PALLIATIVE MEDICINE | Facility: CLINIC | Age: 42
End: 2017-08-17
Payer: COMMERCIAL

## 2017-08-17 VITALS
DIASTOLIC BLOOD PRESSURE: 72 MMHG | BODY MASS INDEX: 28.31 KG/M2 | SYSTOLIC BLOOD PRESSURE: 112 MMHG | OXYGEN SATURATION: 98 % | WEIGHT: 203 LBS | HEART RATE: 77 BPM

## 2017-08-17 DIAGNOSIS — Z79.891 LONG-TERM CURRENT USE OF OPIATE ANALGESIC: ICD-10-CM

## 2017-08-17 DIAGNOSIS — G89.4 CHRONIC PAIN SYNDROME: ICD-10-CM

## 2017-08-17 DIAGNOSIS — F41.1 GAD (GENERALIZED ANXIETY DISORDER): ICD-10-CM

## 2017-08-17 DIAGNOSIS — F11.20 CONTINUOUS OPIOID DEPENDENCE (H): Primary | ICD-10-CM

## 2017-08-17 DIAGNOSIS — Z79.899 ENCOUNTER FOR LONG-TERM (CURRENT) USE OF HIGH-RISK MEDICATION: ICD-10-CM

## 2017-08-17 PROCEDURE — 99214 OFFICE O/P EST MOD 30 MIN: CPT | Performed by: ANESTHESIOLOGY

## 2017-08-17 PROCEDURE — 80307 DRUG TEST PRSMV CHEM ANLYZR: CPT | Mod: 90 | Performed by: ANESTHESIOLOGY

## 2017-08-17 PROCEDURE — 99000 SPECIMEN HANDLING OFFICE-LAB: CPT | Performed by: ANESTHESIOLOGY

## 2017-08-17 RX ORDER — BUPRENORPHINE AND NALOXONE 8; 2 MG/1; MG/1
1 FILM, SOLUBLE BUCCAL; SUBLINGUAL EVERY 12 HOURS
Qty: 60 FILM | Refills: 1 | Status: SHIPPED | OUTPATIENT
Start: 2017-08-17 | End: 2017-10-12

## 2017-08-17 ASSESSMENT — PAIN SCALES - GENERAL: PAINLEVEL: MODERATE PAIN (4)

## 2017-08-17 NOTE — NURSING NOTE
"Chief Complaint   Patient presents with     Pain     neck and low back       Initial /72  Pulse 77  Wt 92.1 kg (203 lb)  SpO2 98%  BMI 28.31 kg/m2 Estimated body mass index is 28.31 kg/(m^2) as calculated from the following:    Height as of 6/16/17: 1.803 m (5' 11\").    Weight as of this encounter: 92.1 kg (203 lb).  Medication Reconciliation: joseph Gutierrez [facility]    "

## 2017-08-17 NOTE — NURSING NOTE
[facility]:      [facility] - [place], [place] - [address] AT [place] [address]  [address] [facility] [address]  Phone: [phone] Fax: [phone]    Fax confirmation received. Hardcopy destroyed.     Joana Gutierrez, [facility]

## 2017-08-17 NOTE — MR AVS SNAPSHOT
After Visit Summary   8/17/2017    Diego Meng    MRN: [id number]           Patient Information     Date Of Birth          1975        Visit Information        Provider Department      8/17/2017 8:00 AM Reba Vallecillo MD [facility]        Today's Diagnoses     Chronic pain syndrome        Continuous opioid dependence (H)          Care Instructions    1. Follow up around October 12th, 2017    Reba Vallecillo MD     ----------------------------------------------------------------  Nurse Triage line:  [phone]   Call this number with any questions or concerns. You may leave a detailed message anytime. Calls are typically returned Monday through Friday between 8 AM and 4:30 PM. We usually get back to you within 2 business days depending on the issue/request.       Medication refills:    For non-narcotic medications, call your pharmacy directly to request a refill. The pharmacy will contact the Pain Management Center for authorization. Please allow 3-4 days for these refills to be processed.     For narcotic refills, call the nurse triage line or send a [employer] message. Please contact us 7-10 days before your refill is due. The message MUST include the name of the specific medication(s) requested and how you would like to receive the prescription(s). The options are as follows:    Pain Clinic staff can mail the prescription to your pharmacy. Please tell us the name of the pharmacy.    You may pick the prescription up at the Pain Clinic (tell us the location) or during a clinic visit with your pain provider    Pain Clinic staff can deliver the prescription to the [facility] pharmacy in the clinic building. Please tell us the location.      Scheduling number: [phone].  Call this number to schedule or change appointments.    We believe regular attendance is key to your success in our program.    Any time you are unable to keep your appointment we ask that you call us at  least 24 hours in advance to let us know. This will allow us to offer the appointment time to another patient.               Follow-ups after your visit        Who to contact     If you have questions or need follow up information about today's clinic visit or your schedule please contact [facility] directly at [phone].  Normal or non-critical lab and imaging results will be communicated to you by MyChart, letter or phone within 4 business days after the clinic has received the results. If you do not hear from us within 7 days, please contact the clinic through [employer] or phone. If you have a critical or abnormal lab result, we will notify you by phone as soon as possible.  Submit refill requests through [employer] or call your pharmacy and they will forward the refill request to us. Please allow 3 business days for your refill to be completed.          Additional Information About Your Visit        MyChart Information     [employer] gives you secure access to your electronic health record. If you see a primary care provider, you can also send messages to your care team and make appointments. If you have questions, please call your primary care clinic.  If you do not have a primary care provider, please call [phone] and they will assist you.        Care EveryWhere ID     This is your Care EveryWhere ID. This could be used by other organizations to access your [place] medical records  [id number]        Your Vitals Were     Pulse Pulse Oximetry BMI (Body Mass Index)             77 98% 28.31 kg/m2          Blood Pressure from Last 3 Encounters:   08/17/17 112/72   07/05/17 118/72   06/30/17 118/74    Weight from Last 3 Encounters:   08/17/17 92.1 kg (203 lb)   07/05/17 92.1 kg (203 lb)   06/30/17 92.5 kg (204 lb)              Today, you had the following     No orders found for display         Today's Medication Changes          These changes are accurate as of: 8/17/17  8:31 AM.  If you have any  questions, ask your nurse or doctor.               Stop taking these medicines if you haven't already. Please contact your care team if you have questions.     ALPRAZolam 0.5 MG tablet   Commonly known as:  XANAX   Stopped by:  Reba Vallecillo MD                Where to get your medicines      Some of these will need a paper prescription and others can be bought over the counter.  Ask your nurse if you have questions.     Bring a paper prescription for each of these medications     buprenorphine HCl-naloxone HCl 8-2 MG per film                Primary Care Provider Office Phone # Fax #    Priscilla Murphy [phone] [address] [place] [address]        Equal Access to Services     [facility]: Hadii em johnson hadasho Soomar, waaxda luqadaha, qaybta kaalmada rohan, jamel alexander . So [facility] [phone].    ATENCIÓN: Si habla español, tiene a yanez disposición servicios gratuitos de asistencia lingüística. [facility] [phone].    We comply with applicable federal civil rights laws and [place] laws. We do not discriminate on the basis of race, color, national origin, age, disability sex, sexual orientation or gender identity.            Thank you!     Thank you for choosing [facility]  for your care. Our goal is always to provide you with excellent care. Hearing back from our patients is one way we can continue to improve our services. Please take a few minutes to complete the written survey that you may receive in the mail after your visit with us. Thank you!             Your Updated Medication List - Protect others around you: Learn how to safely use, store and throw away your medicines at www.disposemymeds.org.          This list is accurate as of: 8/17/17  8:31 AM.  Always use your most recent med list.                   Brand Name Dispense Instructions for use Diagnosis    atorvastatin 40 MG tablet    LIPITOR    90 tablet     Take 1 tablet (40 mg) by mouth daily    Hyperlipidemia LDL goal <160       buprenorphine HCl-naloxone HCl 8-2 MG per film    SUBOXONE    60 Film    Place 1 Film under the tongue every 12 hours Dispense on 7/20/2017. Start on 7/21/2017    Chronic pain syndrome, Continuous opioid dependence (H)       clonazePAM 1 MG tablet    klonoPIN    120 tablet    Take 1 tablet (1 mg) by mouth 4 times daily    Panic attack, BRIDGER (generalized anxiety disorder)       escitalopram 20 MG tablet    LEXAPRO    90 tablet    Take 1 tablet (20 mg) by mouth daily    BRIDGER (generalized anxiety disorder)       esomeprazole 20 MG CR capsule    nexIUM    30 capsule    Take 1 capsule (20 mg) by mouth every morning (before breakfast) Take 30-60 minutes before eating.        methocarbamol 500 MG tablet    ROBAXIN    240 tablet    Take 1-2 tablets (500-1,000 mg) by mouth 4 times daily as needed for muscle spasms    Back muscle spasm       omega 3 1000 MG Caps     90 capsule    Take 1 g by mouth daily

## 2017-08-17 NOTE — NURSING NOTE
Obtained urine specimen.  Tracking number [id number], temp 97 degrees.  Specimen sent to the lab.  Joana Gutierrez [facility]

## 2017-08-17 NOTE — PATIENT INSTRUCTIONS
1. Follow up around October 12th, 2017    Reba Vallecillo MD     ----------------------------------------------------------------  Nurse Triage line:  [phone]   Call this number with any questions or concerns. You may leave a detailed message anytime. Calls are typically returned Monday through Friday between 8 AM and 4:30 PM. We usually get back to you within 2 business days depending on the issue/request.       Medication refills:    For non-narcotic medications, call your pharmacy directly to request a refill. The pharmacy will contact the Pain Management Center for authorization. Please allow 3-4 days for these refills to be processed.     For narcotic refills, call the nurse triage line or send a [employer] message. Please contact us 7-10 days before your refill is due. The message MUST include the name of the specific medication(s) requested and how you would like to receive the prescription(s). The options are as follows:    Pain Clinic staff can mail the prescription to your pharmacy. Please tell us the name of the pharmacy.    You may pick the prescription up at the Pain Clinic (tell us the location) or during a clinic visit with your pain provider    Pain Clinic staff can deliver the prescription to the [place] pharmacy in the clinic building. Please tell us the location.      Scheduling number: [phone].  Call this number to schedule or change appointments.    We believe regular attendance is key to your success in our program.    Any time you are unable to keep your appointment we ask that you call us at least 24 hours in advance to let us know. This will allow us to offer the appointment time to another patient.

## 2017-08-17 NOTE — PROGRESS NOTES
"[facility]    Date of visit: 8/17/2017    Chief complaint:   Chief Complaint   Patient presents with     Pain     neck and low back     Interval history:  Diego GALDAMEZ Yusra is a 41 year old male last seen by me for follow up on 6/30/17.      Recommendations/plan at the last visit included:     Plan:  1. Pain Psychology - schedule with Jaye, he is reluctant to do this and states he has done this at [facility] [place] and they discharged him as stable.   2. PT - YES, however he does not feel he needs this  3. Medication Management:    1. UDS and opioid agreement - YES, today   2. Suboxone 8mg films - QID - this is a very high dose.  I explained to him that this could in fact be making him feel worse and there is an optimal dose for pain for each individual which is likely much lower than what he is on.  I suggested he decrease his dose to 16mg/day divided 4 times daily.  He has agreed to try this.  He is very focused on medication management of his pain and is in search of another medication to take in addition to this for pain.  I explained physical tolerance and chronic pain in detail and the fact that he needs to utilize other modalities to treat his pain.     3. Robaxin 500mg 1-2 PRN QID  4. Referrals - none  5. Follow up with Dr. Vallecillo in 4-8 weeks      Brief history: Began having low back pain at age 20.  Pain continued and progressed to neck and right shoulder pain for the last 20 years.  Began taking high dose opioids after having a L4-5 decompression at [facility] by Dr. Rios. Dose escalated and got \"out of control\" and he elected to transition to first the butrans patch and then suboxone about a year ago.  He has been on and off this over this time period.  Recently was transitioned back to oxycodone and overused.     Since his last visit, Diego GALDAMEZ Yusra reports:  - New Left ankle pain - plantar fascitis.   -Currently debating having a fusion with " Dr. Rios and would like to get a second opinion with Dr. Ayala.   -Making a follow up with sports medicine - Dr. Kaufman.  He has a torn ligament in his left knee and will likely need surgery for that as well. He had a knee injection a month ago which was not helpful.   -Trigger point injections (lumbar spine) not helpful   - Robaxin is helpful in providing relief of the muscle spasm and he is able to stand up straighter. Using rarely  -Has been taking suboxone 8-2 mg #2/day  Feels much better on lower dose.   -Lumbar pain is the worst area, left side, radiates to the posterolateral hip.  The pain is aggravated by sitting and standing. His low back pain also is radiating down the right posterior thigh and leg to the heel. The pain is much worse in his back than in his leg. The right leg gives way at times. Denies any numbness but is having some tingling.  -He has not scheduled with Dr. Benitez yet. He reports that his anxiety has settled down since getting a new job 3 months ago and he does not need pain psychology  -His headaches are unchanged. They start at the back of his head/neck and radiate through his head to his eyes.  He attributes his headaches to rebound and has a very difficult time discontinuing excedrin.     Pain scores:  Pain intensity on average is 5 (8 last visit) on a scale of 0-10.     Current pain treatments:   Suboxone 8-2 mg #2/day  Excedrin #2-6/day for 20 years  lexapro 20 mg daily  Clonazepam 1 mg #1-2/day  Robaxin 500-1000mg PRN QID    Side Effects: no side effect    Past pain treatments:  iDego Meng has been seen at a pain clinic in the past. [facility] Dr. Vázquez and [facility]  Medications:     Medrol dose carolin -no relief              botox -no relief              Anti-migraine: fioricet, migranal, eletriptan, frovatriptan, midrin, naraptriptan, sumatriptan, zolmitripatn -no relief from any of these              Anti-convulsants: depakote, topiramate,  gabapentin, lyrica -all of these made him feel fuzzy and forgetful and they were not helpful              Opioids: oxycontin, morphine -no relief, hydrocodone -no relief, tramadol -no relief                Muscle relaxants: soma -no relief, flexeril -no relief and made him tired, norflex -no relief, tizanidine -no relief and made him tired              NSAIDs: allergic to this class of medications -rash, abdominal pain and cramping, toradol -upset stomach              Anti-depressants: cymbalta -no relief                Topicals: lidocaine -no relief  PT: [facility] in 2016 -helped for shoulder but flared up back and neck  Psychology: yes for pain at the [facility] -'continues to use these skills'  Acupuncture: tried it -no relief  Chiropractic care: tried it -helped initially but then flared up pain  TENS Unit: no relief  Injections: R L4 and L5 TF BAY 12/24/16 -flared pain, cervical epidural steroid injection -provided 2-4 weeks of relief but had significant flare up for 2 weeks post-procedure, cervical trigger point injections -minimal relief, lumbar medial branch block -no relief, lumbar paraspinal trigger point injection 5/17/17  Surgery: right L4-L5 hemilaminectomy May 2015 Dr. Rios    Medications:  Current Outpatient Prescriptions   Medication Sig Dispense Refill     clonazePAM (KLONOPIN) 1 MG tablet Take 1 tablet (1 mg) by mouth 4 times daily 120 tablet 0     buprenorphine HCl-naloxone HCl (SUBOXONE) 8-2 MG per film Place 1 Film under the tongue every 12 hours Dispense on 7/20/2017. Start on 7/21/2017 60 Film 0     ALPRAZolam (XANAX) 0.5 MG tablet Take 1 tablet (0.5 mg) by mouth 2 times daily as needed for anxiety 60 tablet 0     escitalopram (LEXAPRO) 20 MG tablet Take 1 tablet (20 mg) by mouth daily 90 tablet 1     atorvastatin (LIPITOR) 40 MG tablet Take 1 tablet (40 mg) by mouth daily 90 tablet 1     methocarbamol (ROBAXIN) 500 MG tablet Take 1-2 tablets (500-1,000  mg) by mouth 4 times daily as needed for muscle spasms (Patient not taking: Reported on 6/16/2017) 240 tablet 3     esomeprazole (NEXIUM) 20 MG capsule Take 1 capsule (20 mg) by mouth every morning (before breakfast) Take 30-60 minutes before eating. 30 capsule 0     omega 3 1000 MG CAPS Take 1 g by mouth daily 90 capsule        Medical History: any changes in medical history since they were last seen? no    Review of Systems:  The 14 system ROS was reviewed from the intake questionnaire, and is positive for: headache, joint pain, anxiety, stress, back pain and neck pain  Any bowel or bladder problems: none  Mood: anxiety, stress    Physical Exam:  Blood pressure 112/72, pulse 77, weight 92.1 kg (203 lb), SpO2 98 %.  General: no acute distress, pleasant, sitting comfortably but leaning to the left  Gait: antalgic  MSK: full strength but with some guarding due to pain    Imaging:  Lumbar MRI completed on 4/24/17:  FINDINGS: Five lumbar vertebrae are assumed. Degenerative loss of disc  signal with relatively normal disc height is noted at L4-L5 and L5-S1.  Minimal discogenic endplate reactive marrow edema is noted posteriorly  at L4-L5 as well. Apophyseal joints are essentially within normal  limits. Vertebral body heights and sagittal alignment are within  normal limits. The conus medullaris is unremarkable in appearance on  the sagittal images.      T12-L1, L1-L2, L2-L3, L3-L4: Normal.     L4-L5: Mild annular bulging and endplate spurring with mild foraminal  narrowing below the exiting nerve roots. Posterior high signal annular  fissuring with minimal central disc protrusion. No central stenosis.     L5-S1: Small 0.3 cm caudally dissecting disc extrusion to the left of  midline, similar in appearance to 3/10/2015. This does not indent the  thecal sac or compress/displace the left S1 nerve root. No abnormal  enhancement. No central or foraminal stenosis.         IMPRESSION:  1. L5-S1 small caudally dissecting 0.3 cm  disc extrusion slightly to  the left of midline without apparent neural impingement or stenosis.  2. L4-L5 small central disc protrusion, without apparent neural  impingement or central stenosis, and mild bilateral foraminal  stenosis.    Lumbar MRI completed on 8/1/15 showed:  CONCLUSION:    1. Interval right hemilaminotomy and partial facetectomy at L4-L5. No recurrent disc herniation.  2. Other minor degenerative changes. Annular fissures L4-L5 and L5-S1. No high-grade central canal or foraminal stenosis at any level.    Cervical spine MRI completed on 11/14/14:  FINDINGS: Cervical spine visualized through the upper thoracic spine. Lordosis normal. Slight retrolisthesis at each level between C3 and C6. Vertebral body heights are normal. Marrow signal is normal, including the facets. Paraspinous soft tissues are unremarkable. Visible posterior fossa contents are unremarkable. Cord signal and morphology is normal.  CRANIOCERVICAL JUNCTION: Mild anterior spurring at C1-C2. Foramen magnum and central canal are adequate.  C2-C3: Disc height normal. Mild facet hypertrophy. No central canal or foraminal stenosis.  C3-C4: Disc height normal. Slight right uncinate hypertrophy. Minimal facet hypertrophy. No significant central canal or foraminal stenosis.    C4-C5: Slight disc height loss. Mild bilateral facet hypertrophy. No central canal or foraminal stenosis.  C5-C6: Mild disc height loss with left eccentric disc osteophyte complex. Mild bilateral facet hypertrophy. No central canal or right foraminal stenosis. Moderate left foraminal stenosis.    C6-C7: Slight disc height loss. Mild facet hypertrophy. No central canal or foraminal stenosis.  C7-T1: Mild disc height loss. Mild to moderate bilateral facet hypertrophy. No central canal or foraminal stenosis.  CONCLUSION:  1. Moderate left foraminal stenosis at C5-C6.  2. Other relatively minor degenerative changes, no further high-grade central canal or foraminal  stenosis.     Right shoulder x-ray completed on 4/2/15:  FINDINGS: No evidence of fracture or dislocation. Normal glenohumeral joint alignment without evidence of degenerative changes. No evidence of significant acromioclavicular joint degenerative joint disease. No acromial or coracoid enthesophyte. Mild undersurface acromial sclerosis. Downslope type three acromion. No blastic or lytic lesions.  IMPRESSION:  Right shoulder type three acromion otherwise within normal limits.     [place] Board  Pharmacy Data Base Reviewed: YES; as expected, no concern for abuse or misuse    Assessment:   1. Chronic pain syndrome  2. Opioid dependency - uncomplicated  3. Chronic low back pain. Right L4-L5 hemilaminectomy partial facetectomy in May 2015 with Dr. Rios. Now with right sided radicular symptoms and left-sided lumbar spasms  4. Chronic neck pain. Cervical facet arthropathy.  5. Chronic headaches. Likely related to facet arthropathy, psychogenic, and rebound (excedrin)  6. Anxiety with panic attacks. Takes lexapro and clonazepam. Stable.  7. Tobacco use disorder  8. Chronic bilateral knee pain. Left knee meniscus repair on 11/28/16 with Dr. Kaufmna.    9. Chronic right shoulder pain.  10. No self cares. Reliant on medication.   11. Working full time as an .     Plan:  1. Pain Psychology - schedule with Jaye, he is reluctant to do this and states he has done this at [facility] and they discharged him as stable.   2. PT - YES, however he does not feel he needs this  3. Medication Management:    1. UDS - repeat today / Opioid agreement - 6/30/2017   2. Suboxone 8mg films - BID - ONE refill given  He was able to decrease his dose from 32mg daily to 16mg daily.  He is getting good pain control with this.     3. Robaxin 500mg PRN - not using often  4. Referrals - none  5. Follow up with Dr. Vallecillo - 2 months - OCT 12th, 2017    Total time spent was 30 minutes, and more than 50% of face to face time was spent  in counseling and/or coordination of care regarding the above assessment and plan.    Reba Vallecillo MD [facility]  [facility]

## 2017-08-24 LAB — PAIN DRUG SCR UR W RPTD MEDS: NORMAL

## 2017-09-05 DIAGNOSIS — F41.1 GAD (GENERALIZED ANXIETY DISORDER): ICD-10-CM

## 2017-09-05 DIAGNOSIS — F41.0 PANIC ATTACK: ICD-10-CM

## 2017-09-05 NOTE — TELEPHONE ENCOUNTER
clonazePAM (KLONOPIN) 1 MG tablet  Last Written Prescription Date:  8/8/17  Last Fill Quantity: 120,   # refills: 0  Last Office Visit with [facility], [facility] or [facility] prescribing provider: 7/5/17  Future Office visit:    Next 5 appointments (look out 90 days)     Oct 12, 2017  8:00 AM CDT   Return Visit with Reba Vallecillo MD   [facility] ([facility])    [address] [address] [address]   [phone]                   Routing refill request to provider for review/approval because:  Drug not on the [facility], [facility] or [facility] refill protocol or controlled substance    Patient will  from [facility]

## 2017-09-06 RX ORDER — CLONAZEPAM 1 MG/1
1 TABLET ORAL 4 TIMES DAILY
Qty: 120 TABLET | Refills: 0 | Status: SHIPPED | OUTPATIENT
Start: 2017-09-06 | End: 2017-10-03

## 2017-09-06 NOTE — TELEPHONE ENCOUNTER
Printed, signed, in my outbox.    Priscilla Murphy MD  Internal Medicine/Pediatrics  [facility]

## 2017-09-06 NOTE — TELEPHONE ENCOUNTER
Rx walked to pharmacy downstairs.  They will notify pt when it is ready to be picked up.    Nirali May MA   September 6, 2017,  10:38 AM

## 2017-10-02 ENCOUNTER — TELEPHONE (OUTPATIENT)
Dept: PALLIATIVE MEDICINE | Facility: CLINIC | Age: 42
End: 2017-10-02

## 2017-10-02 NOTE — TELEPHONE ENCOUNTER
Patient called in to report he had dental work last week and was given Tylenol 3.     Brigette Almonte    Pain Management Clinic

## 2017-10-02 NOTE — TELEPHONE ENCOUNTER
confirms Diego received quantity of 25 Tylenol #3 from [facility] on 9/29/17.    Routing to provider as WILTON Christianson, MSN, RN-BC  Care Coordinator  [facility]

## 2017-10-03 DIAGNOSIS — F41.0 PANIC ATTACK: ICD-10-CM

## 2017-10-03 DIAGNOSIS — F41.1 GAD (GENERALIZED ANXIETY DISORDER): ICD-10-CM

## 2017-10-03 NOTE — TELEPHONE ENCOUNTER
Patient calling for a refill and he would like us to take it to the pharmacy. He will need to pick it up by Friday okay to wait until Dr Murphy is back tomorrow.    clonazePAM   Last Written Prescription Date:  9/6/17  Last Fill Quantity: 120,   # refills: 0  Last Office Visit with [facility], [facility] or [facility] prescribing provider: 7/5/17  Future Office visit:    Next 5 appointments (look out 90 days)     Oct 12, 2017  8:00 AM CDT   Return Visit with Reba Vallecillo MD   [facility] ([facility])    [address] [address] [address]   [phone]                   Routing refill request to provider for review/approval because:  Drug not on the [facility], [facility] or [facility] refill protocol or controlled substance

## 2017-10-04 RX ORDER — CLONAZEPAM 1 MG/1
1 TABLET ORAL 4 TIMES DAILY
Qty: 120 TABLET | Refills: 0 | Status: SHIPPED | OUTPATIENT
Start: 2017-10-04 | End: 2017-11-01

## 2017-10-04 NOTE — TELEPHONE ENCOUNTER
Called and spoke to Diego, he would like it filled at [facility] pharmacy.  Rx walked to [facility] pharmacy.    Mariya Ramsey MA

## 2017-10-04 NOTE — TELEPHONE ENCOUNTER
Printed, signed, in my outbox.    Priscilla Murphy MD  Internal Medicine/Pediatrics  [facility]

## 2017-10-10 NOTE — PROGRESS NOTES
"                          [facility]    Date of visit: 10/12/2017    Chief complaint:   Chief Complaint   Patient presents with     Pain     lumbar     Interval history:  Diego Meng is a 42 year old male last seen by me for follow up on 8/17/17.      Brief history: 42 YEAR OLD MALE who began having low back pain at age 20.  Pain continued and progressed to neck and right shoulder pain for the last 20 years.  History of high dose opioids after having a L4-5 decompression at [facility] by Dr. Rios. Dose escalated and got \"out of control\".  He admits to overuse.  He was transitioned to buprenorphine by Dr. Almonte and was on very high dose 32mg when he was transferred to myself for ongoing care.  He has been successful in decreasing to 16mg daily with better pain control.   PMHx is significant for anxiety.     Since his last visit, Diego Meng reports:  - had some dental work done and they gave him some T#3.  It is still painful  - Psychiatrist Dr. Murphy prescribes his Klonopin 1mg 4 times/day for anxiety  - Suboxone 8/2mg #2/day  -Lumbar pain is the worst area, left side, radiates to the posterolateral hip.  The pain is aggravated by sitting and standing. His low back pain also is radiating down the right posterior thigh and leg to the heel. The pain is much worse in his back than in his leg. The right leg gives way at times. Denies any numbness but is having some tingling.  - Having more widespread pain problems.   - Left foot pain - plantar fascitis.   -Currently debating having a fusion with Dr. Rios and would like to get a second opinion with Dr. Ayala.  He does not want to do this unless he is forced to.   -He did not follow up with sports medicine - Dr. Kaufman.  He has a torn medial meniscus in his left knee and will likely need surgery for that as well. He had a knee injection a month ago which was not helpful.   -Trigger point injections (lumbar spine) " not helpful   - Robaxin is not helpful.   - Full time  (designes health care buildings)   -He has not scheduled with Dr. Benitez yet. He reports that his anxiety has settled down since getting a new job 3 months ago and he does not need pain psychology  -His headaches are unchanged. They start at the back of his head/neck and radiate through his head to his eyes.  He attributes his headaches to rebound and has a very difficult time discontinuing excedrin.     Pain scores:  Pain intensity on average is 5 (8 last visit) on a scale of 0-10.     Current pain treatments:   Suboxone 8-2 mg #2/day  Excedrin #2-6/day for 20 years  lexapro 20 mg daily  Clonazepam 1 mg #1-2/day  Robaxin 500-1000mg PRN QID    Side Effects: no side effect    Past pain treatments:  Diego Grayfransiscomike has been seen at a pain clinic in the past. [facility] Dr. Vázquez and [facility]  Medications:     Medrol dose carolin -no relief              botox -no relief              Anti-migraine: fioricet, migranal, eletriptan, frovatriptan, midrin, naraptriptan, sumatriptan, zolmitripatn -no relief from any of these              Anti-convulsants: depakote, topiramate, gabapentin, lyrica -all of these made him feel fuzzy and forgetful and they were not helpful              Opioids: oxycontin, morphine -no relief, hydrocodone -no relief, tramadol -no relief                Muscle relaxants: soma -no relief, flexeril -no relief and made him tired, norflex -no relief, tizanidine -no relief and made him tired              NSAIDs: allergic to this class of medications -rash, abdominal pain and cramping, toradol -upset stomach              Anti-depressants: cymbalta -no relief                Topicals: lidocaine -no relief  PT: [facility] in 2016 -helped for shoulder but flared up back and neck  Psychology: yes for pain at the [facility] -'continues to use these skills'  Acupuncture: tried it -no  relief  Chiropractic care: tried it -helped initially but then flared up pain  TENS Unit: no relief  Injections: R L4 and L5 [facility] 12/24/16 -flared pain, cervical epidural steroid injection -provided 2-4 weeks of relief but had significant flare up for 2 weeks post-procedure, cervical trigger point injections -minimal relief, lumbar medial branch block -no relief, lumbar paraspinal trigger point injection 5/17/17  Surgery: right L4-L5 hemilaminectomy May 2015 Dr. Rios    Medications:  Current Outpatient Prescriptions   Medication Sig Dispense Refill     clonazePAM (KLONOPIN) 1 MG tablet Take 1 tablet (1 mg) by mouth 4 times daily 120 tablet 0     buprenorphine HCl-naloxone HCl (SUBOXONE) 8-2 MG per film Place 1 Film under the tongue every 12 hours Dispense on 7/20/2017. Start on 7/21/2017 60 Film 1     escitalopram (LEXAPRO) 20 MG tablet Take 1 tablet (20 mg) by mouth daily 90 tablet 1     atorvastatin (LIPITOR) 40 MG tablet Take 1 tablet (40 mg) by mouth daily 90 tablet 1     methocarbamol (ROBAXIN) 500 MG tablet Take 1-2 tablets (500-1,000 mg) by mouth 4 times daily as needed for muscle spasms (Patient not taking: Reported on 6/16/2017) 240 tablet 3     esomeprazole (NEXIUM) 20 MG capsule Take 1 capsule (20 mg) by mouth every morning (before breakfast) Take 30-60 minutes before eating. 30 capsule 0     omega 3 1000 MG CAPS Take 1 g by mouth daily 90 capsule        Medical History: any changes in medical history since they were last seen? no    Review of Systems:  The 14 system ROS was reviewed from the intake questionnaire, and is positive for: headache, joint pain, anxiety, stress, back pain and neck pain  Any bowel or bladder problems: none  Mood: anxiety, stress    Physical Exam:  Blood pressure 104/57, pulse 82, weight 92.1 kg (203 lb), SpO2 95 %.  General: no acute distress, pleasant, sitting comfortably but leaning to the left  Gait: antalgic  MSK: full strength but with some guarding due to  pain    Imaging:  Lumbar MRI completed on 4/24/17:  FINDINGS: Five lumbar vertebrae are assumed. Degenerative loss of disc  signal with relatively normal disc height is noted at L4-L5 and L5-S1.  Minimal discogenic endplate reactive marrow edema is noted posteriorly  at L4-L5 as well. Apophyseal joints are essentially within normal  limits. Vertebral body heights and sagittal alignment are within  normal limits. The conus medullaris is unremarkable in appearance on  the sagittal images.      T12-L1, L1-L2, L2-L3, L3-L4: Normal.     L4-L5: Mild annular bulging and endplate spurring with mild foraminal  narrowing below the exiting nerve roots. Posterior high signal annular  fissuring with minimal central disc protrusion. No central stenosis.     L5-S1: Small 0.3 cm caudally dissecting disc extrusion to the left of  midline, similar in appearance to 3/10/2015. This does not indent the  thecal sac or compress/displace the left S1 nerve root. No abnormal  enhancement. No central or foraminal stenosis.       IMPRESSION:  1. L5-S1 small caudally dissecting 0.3 cm disc extrusion slightly to  the left of midline without apparent neural impingement or stenosis.  2. L4-L5 small central disc protrusion, without apparent neural  impingement or central stenosis, and mild bilateral foraminal  stenosis.    Lumbar MRI completed on 8/1/15 showed:  CONCLUSION:    1. Interval right hemilaminotomy and partial facetectomy at L4-L5. No recurrent disc herniation.  2. Other minor degenerative changes. Annular fissures L4-L5 and L5-S1. No high-grade central canal or foraminal stenosis at any level.    Cervical spine MRI completed on 11/14/14:  FINDINGS: Cervical spine visualized through the upper thoracic spine. Lordosis normal. Slight retrolisthesis at each level between C3 and C6. Vertebral body heights are normal. Marrow signal is normal, including the facets. Paraspinous soft tissues are unremarkable. Visible posterior fossa contents are  unremarkable. Cord signal and morphology is normal.  CRANIOCERVICAL JUNCTION: Mild anterior spurring at C1-C2. Foramen magnum and central canal are adequate.  C2-C3: Disc height normal. Mild facet hypertrophy. No central canal or foraminal stenosis.  C3-C4: Disc height normal. Slight right uncinate hypertrophy. Minimal facet hypertrophy. No significant central canal or foraminal stenosis.    C4-C5: Slight disc height loss. Mild bilateral facet hypertrophy. No central canal or foraminal stenosis.  C5-C6: Mild disc height loss with left eccentric disc osteophyte complex. Mild bilateral facet hypertrophy. No central canal or right foraminal stenosis. Moderate left foraminal stenosis.    C6-C7: Slight disc height loss. Mild facet hypertrophy. No central canal or foraminal stenosis.  C7-T1: Mild disc height loss. Mild to moderate bilateral facet hypertrophy. No central canal or foraminal stenosis.  CONCLUSION:  1. Moderate left foraminal stenosis at C5-C6.  2. Other relatively minor degenerative changes, no further high-grade central canal or foraminal stenosis.     Right shoulder x-ray completed on 4/2/15:  FINDINGS: No evidence of fracture or dislocation. Normal glenohumeral joint alignment without evidence of degenerative changes. No evidence of significant acromioclavicular joint degenerative joint disease. No acromial or coracoid enthesophyte. Mild undersurface acromial sclerosis. Downslope type three acromion. No blastic or lytic lesions.  IMPRESSION:  Right shoulder type three acromion otherwise within normal limits.     [place] Board of Pharmacy Data Base Reviewed: YES; as expected, no concern for abuse or misuse    Assessment:   1. Chronic pain syndrome - widespread pain  2. Opioid dependency uncomplicated  3. Chronic low back pain. Right L4-L5 hemilaminectomy partial facetectomy in May 2015 with Dr. Rios. Now with right sided radicular symptoms and left-sided lumbar spasms  4. Chronic neck pain. Cervical  "facet arthropathy.  5. Chronic headaches. Likely related to facet arthropathy, psychogenic, and rebound (excedrin)  6. Anxiety with panic attacks. Takes lexapro and clonazepam. Stable.  7. Tobacco use disorder  8. Chronic bilateral knee pain. Left knee meniscus repair on 11/28/16 with Dr. Kaufman.    9. Chronic right shoulder pain.  10. No self cares. Reliant on medication.   11. Working full time as an .     Plan:  1. Pain Psychology - schedule with Jaye, he is reluctant to do this and states he has done this at [facility] and they discharged him as \"stable\".   2. PT - YES, however he does not feel he needs this  3. Medication Management:    1. UDS -  8/17/2017 normal / Opioid agreement - 6/30/2017   2. Suboxone 8mg films - BID - ONE refill given  He was able to decrease his dose from 32mg daily to 16mg daily.  He is getting good pain control with this.     4. Referrals - none  5. Follow up with Dr. Vallecillo - 3months    Total time spent was 30 minutes, and more than 50% of face to face time was spent in counseling and/or coordination of care regarding the above assessment and plan.    Reba Vallecillo MD [facility]  [facility]      "

## 2017-10-12 ENCOUNTER — OFFICE VISIT (OUTPATIENT)
Dept: PALLIATIVE MEDICINE | Facility: CLINIC | Age: 42
End: 2017-10-12
Payer: COMMERCIAL

## 2017-10-12 VITALS
BODY MASS INDEX: 28.31 KG/M2 | OXYGEN SATURATION: 95 % | DIASTOLIC BLOOD PRESSURE: 57 MMHG | HEART RATE: 82 BPM | SYSTOLIC BLOOD PRESSURE: 104 MMHG | WEIGHT: 203 LBS

## 2017-10-12 DIAGNOSIS — Z79.899 ENCOUNTER FOR LONG-TERM (CURRENT) USE OF HIGH-RISK MEDICATION: ICD-10-CM

## 2017-10-12 DIAGNOSIS — G89.4 CHRONIC PAIN SYNDROME: ICD-10-CM

## 2017-10-12 DIAGNOSIS — F41.1 GAD (GENERALIZED ANXIETY DISORDER): ICD-10-CM

## 2017-10-12 DIAGNOSIS — F11.20 CONTINUOUS OPIOID DEPENDENCE (H): Primary | ICD-10-CM

## 2017-10-12 PROCEDURE — 99214 OFFICE O/P EST MOD 30 MIN: CPT | Performed by: ANESTHESIOLOGY

## 2017-10-12 RX ORDER — BUPRENORPHINE AND NALOXONE 8; 2 MG/1; MG/1
1 FILM, SOLUBLE BUCCAL; SUBLINGUAL EVERY 12 HOURS
Qty: 60 FILM | Refills: 2 | Status: SHIPPED | OUTPATIENT
Start: 2017-10-12 | End: 2018-01-12

## 2017-10-12 ASSESSMENT — PAIN SCALES - GENERAL: PAINLEVEL: SEVERE PAIN (6)

## 2017-10-12 NOTE — NURSING NOTE
Rx faxed to:        [id number] - [place], [place] - [address] AT [place] [address]  [address] [facility] [address]  Phone: [phone] Fax: [phone]      Fax confirmation received. Hardcopy destroyed.     Joana Gutierrez, [facility]

## 2017-10-12 NOTE — MR AVS SNAPSHOT
After Visit Summary   10/12/2017    Diego Meng    MRN: [id number]           Patient Information     Date Of Birth          1975        Visit Information        Provider Department      10/12/2017 8:00 AM Reba Vallecillo MD [facility]        Today's Diagnoses     Continuous opioid dependence (H)    -  1    Chronic pain syndrome        Encounter for long-term (current) use of high-risk medication        BRIDGER (generalized anxiety disorder)          Care Instructions    1. Follow up in 3 months    Reba Vallecillo MD     ----------------------------------------------------------------  Nurse Triage line:  [phone]   Call this number with any questions or concerns. You may leave a detailed message anytime. Calls are typically returned Monday through Friday between 8 AM and 4:30 PM. We usually get back to you within 2 business days depending on the issue/request.       Medication refills:    For non-narcotic medications, call your pharmacy directly to request a refill. The pharmacy will contact the [facility] Management Center for authorization. Please allow 3-4 days for these refills to be processed.     For narcotic refills, call the nurse triage line or send a [employer] message. Please contact us 7-10 days before your refill is due. The message MUST include the name of the specific medication(s) requested and how you would like to receive the prescription(s). The options are as follows:    Pain Clinic staff can mail the prescription to your pharmacy. Please tell us the name of the pharmacy.    You may pick the prescription up at the Pain Clinic (tell us the location) or during a clinic visit with your pain provider    Pain Clinic staff can deliver the prescription to the [facility] pharmacy in the clinic building. Please tell us the location.      Scheduling number: [phone].  Call this number to schedule or change appointments.    We believe regular attendance is key to your  success in our program.    Any time you are unable to keep your appointment we ask that you call us at least 24 hours in advance to let us know. This will allow us to offer the appointment time to another patient.               Follow-ups after your visit        Who to contact     If you have questions or need follow up information about today's clinic visit or your schedule please contact [facility] directly at [phone].  Normal or non-critical lab and imaging results will be communicated to you by [employer], letter or phone within 4 business days after the clinic has received the results. If you do not hear from us within 7 days, please contact the clinic through [employer] or phone. If you have a critical or abnormal lab result, we will notify you by phone as soon as possible.  Submit refill requests through [employer] or call your pharmacy and they will forward the refill request to us. Please allow 3 business days for your refill to be completed.          Additional Information About Your Visit        [employer] Information     [employer] gives you secure access to your electronic health record. If you see a primary care provider, you can also send messages to your care team and make appointments. If you have questions, please call your primary care clinic.  If you do not have a primary care provider, please call [phone] and they will assist you.        Care EveryWhere ID     This is your Care EveryWhere ID. This could be used by other organizations to access your [facility] [place] medical records  [id number]        Your Vitals Were     Pulse Pulse Oximetry BMI (Body Mass Index)             82 95% 28.31 kg/m2          Blood Pressure from Last 3 Encounters:   10/12/17 104/57   08/17/17 112/72   07/05/17 118/72    Weight from Last 3 Encounters:   10/12/17 92.1 kg (203 lb)   08/17/17 92.1 kg (203 lb)   07/05/17 92.1 kg (203 lb)              Today, you had the following     No orders found for display          Today's Medication Changes          These changes are accurate as of: 10/12/17  8:32 AM.  If you have any questions, ask your nurse or doctor.               These medicines have changed or have updated prescriptions.        Dose/Directions    buprenorphine HCl-naloxone HCl 8-2 MG per film   Commonly known as:  SUBOXONE   This may have changed:  additional instructions   Used for:  Chronic pain syndrome, Continuous opioid dependence (H)   Changed by:  Reba Vallecillo MD        Dose:  1 Film   Place 1 Film under the tongue every 12 hours   Quantity:  60 Film   Refills:  2            Where to get your medicines      Some of these will need a paper prescription and others can be bought over the counter.  Ask your nurse if you have questions.     Bring a paper prescription for each of these medications     buprenorphine HCl-naloxone HCl 8-2 MG per film                Primary Care Provider Office Phone # Fax #    Priscilla Murphy [phone] [address] [place] [address]        Equal Access to Services     [facility]: Hadii aad ku hadasho Soomaali, waaxda luqadaha, qaybta kaalmada adeegyada, waxay crowin haybraydenn maurice alexander . So [facility] [phone].    ATENCIÓN: Si habla español, tiene a yanez disposición servicios gratuitos de asistencia lingüística. [facility] [phone].    We comply with applicable federal civil rights laws and [place] laws. We do not discriminate on the basis of race, color, national origin, age, disability, sex, sexual orientation, or gender identity.            Thank you!     Thank you for choosing [facility]  for your care. Our goal is always to provide you with excellent care. Hearing back from our patients is one way we can continue to improve our services. Please take a few minutes to complete the written survey that you may receive in the mail after your visit with us. Thank you!             Your Updated Medication List -  Protect others around you: Learn how to safely use, store and throw away your medicines at www.disposemymeds.org.          This list is accurate as of: 10/12/17  8:32 AM.  Always use your most recent med list.                   Brand Name Dispense Instructions for use Diagnosis    atorvastatin 40 MG tablet    LIPITOR    90 tablet    Take 1 tablet (40 mg) by mouth daily    Hyperlipidemia LDL goal <160       buprenorphine HCl-naloxone HCl 8-2 MG per film    SUBOXONE    60 Film    Place 1 Film under the tongue every 12 hours    Chronic pain syndrome, Continuous opioid dependence (H)       clonazePAM 1 MG tablet    klonoPIN    120 tablet    Take 1 tablet (1 mg) by mouth 4 times daily    Panic attack, BRIDGER (generalized anxiety disorder)       escitalopram 20 MG tablet    LEXAPRO    90 tablet    Take 1 tablet (20 mg) by mouth daily    BRIDGER (generalized anxiety disorder)       esomeprazole 20 MG CR capsule    nexIUM    30 capsule    Take 1 capsule (20 mg) by mouth every morning (before breakfast) Take 30-60 minutes before eating.        methocarbamol 500 MG tablet    ROBAXIN    240 tablet    Take 1-2 tablets (500-1,000 mg) by mouth 4 times daily as needed for muscle spasms    Back muscle spasm       omega 3 1000 MG Caps     90 capsule    Take 1 g by mouth daily

## 2017-10-12 NOTE — NURSING NOTE
"Chief Complaint   Patient presents with     Pain       Initial /57  Pulse 82  Wt 92.1 kg (203 lb)  SpO2 95%  BMI 28.31 kg/m2 Estimated body mass index is 28.31 kg/(m^2) as calculated from the following:    Height as of 6/16/17: 1.803 m (5' 11\").    Weight as of this encounter: 92.1 kg (203 lb).    Joana Gutierrez [facility]    "

## 2017-10-12 NOTE — PATIENT INSTRUCTIONS
1. Follow up in 3 months    Reba Vallecillo MD     ----------------------------------------------------------------  Nurse Triage line:  [phone]   Call this number with any questions or concerns. You may leave a detailed message anytime. Calls are typically returned Monday through Friday between 8 AM and 4:30 PM. We usually get back to you within 2 business days depending on the issue/request.       Medication refills:    For non-narcotic medications, call your pharmacy directly to request a refill. The pharmacy will contact the Pain Management Center for authorization. Please allow 3-4 days for these refills to be processed.     For narcotic refills, call the nurse triage line or send a "[employer]" message. Please contact us 7-10 days before your refill is due. The message MUST include the name of the specific medication(s) requested and how you would like to receive the prescription(s). The options are as follows:    Pain Clinic staff can mail the prescription to your pharmacy. Please tell us the name of the pharmacy.    You may pick the prescription up at the Pain Clinic (tell us the location) or during a clinic visit with your pain provider    Pain Clinic staff can deliver the prescription to the [facility] pharmacy in the clinic building. Please tell us the location.      Scheduling number: [phone].  Call this number to schedule or change appointments.    We believe regular attendance is key to your success in our program.    Any time you are unable to keep your appointment we ask that you call us at least 24 hours in advance to let us know. This will allow us to offer the appointment time to another patient.

## 2017-11-01 DIAGNOSIS — F41.0 PANIC ATTACK: ICD-10-CM

## 2017-11-01 DIAGNOSIS — F41.1 GAD (GENERALIZED ANXIETY DISORDER): ICD-10-CM

## 2017-11-01 RX ORDER — CLONAZEPAM 1 MG/1
1 TABLET ORAL 4 TIMES DAILY
Qty: 120 TABLET | Refills: 0 | Status: SHIPPED | OUTPATIENT
Start: 2017-11-01 | End: 2017-11-30

## 2017-11-01 NOTE — TELEPHONE ENCOUNTER
Patient would like RX filled at [facility] pharmacy, please walk to pharmacy when complete.    clonazePAM (KLONOPIN) 1 MG tablet        Last Written Prescription Date:  10/4/2017  Last Fill Quantity: 120,   # refills: 0  Future Office visit:    Next 5 appointments (look out 90 days)     Jan 12, 2018 10:00 AM CST   Return Visit with Reba Vallecillo MD   [facility] ([facility])    [address] [address] [address]   [phone]                   Routing refill request to provider for review/approval because:  Drug not on the [facility], [facility] or [facility] refill protocol or controlled substance

## 2017-11-01 NOTE — TELEPHONE ENCOUNTER
Printed, signed, in my outbox.    Priscilla Murphy MD  Internal Medicine/Pediatrics  [facility]

## 2017-11-02 ENCOUNTER — OFFICE VISIT (OUTPATIENT)
Dept: PEDIATRICS | Facility: CLINIC | Age: 42
End: 2017-11-02
Payer: COMMERCIAL

## 2017-11-02 VITALS
DIASTOLIC BLOOD PRESSURE: 70 MMHG | WEIGHT: 214.6 LBS | TEMPERATURE: 98.2 F | BODY MASS INDEX: 30.04 KG/M2 | HEART RATE: 70 BPM | OXYGEN SATURATION: 98 % | HEIGHT: 71 IN | SYSTOLIC BLOOD PRESSURE: 116 MMHG

## 2017-11-02 DIAGNOSIS — J02.9 PHARYNGITIS WITH VIRAL SYNDROME: ICD-10-CM

## 2017-11-02 DIAGNOSIS — B34.9 PHARYNGITIS WITH VIRAL SYNDROME: ICD-10-CM

## 2017-11-02 DIAGNOSIS — R07.0 THROAT PAIN: Primary | ICD-10-CM

## 2017-11-02 LAB
DEPRECATED S PYO AG THROAT QL EIA: NORMAL
SPECIMEN SOURCE: NORMAL

## 2017-11-02 PROCEDURE — 87081 CULTURE SCREEN ONLY: CPT | Performed by: STUDENT IN AN ORGANIZED HEALTH CARE EDUCATION/TRAINING PROGRAM

## 2017-11-02 PROCEDURE — 87880 STREP A ASSAY W/OPTIC: CPT | Performed by: STUDENT IN AN ORGANIZED HEALTH CARE EDUCATION/TRAINING PROGRAM

## 2017-11-02 PROCEDURE — 99213 OFFICE O/P EST LOW 20 MIN: CPT | Mod: GE | Performed by: STUDENT IN AN ORGANIZED HEALTH CARE EDUCATION/TRAINING PROGRAM

## 2017-11-02 NOTE — MR AVS SNAPSHOT
After Visit Summary   11/2/2017    Diego Meng    MRN: [id number]           Patient Information     Date Of Birth          1975        Visit Information        Provider Department      11/2/2017 1:15 PM David Hernandes MD [facility]        Today's Diagnoses     Throat pain    -  1    Pharyngitis with viral syndrome          Care Instructions    - rapid strep test is negative; your symptoms are likely secondary viral infection  - can use flonase 1-2 sprays to each nostril daily  - humidified air to prevent dehydration  - warm water with honey for cough and sore throat  - continue using tylenol as needed for pain  - xylocaine solution has been prescribed; use this every 3 hours as needed for sore throat; gargle in your mouth for couple minutes and discard  - if symptoms do not improve in 2-3 weeks, please call our clinic and re-evaluate      Viral Pharyngitis (Sore Throat)    You (or your child, if your child is the patient) have pharyngitis (sore throat). This infection is caused by a virus. It can cause throat pain that is worse when swallowing, aching all over, headache, and fever. The infection may be spread by coughing, kissing, or touching others after touching your mouth or nose. Antibiotic medications do not work against viruses, so they are not used for treating this condition.  Home care    If your symptoms are severe, rest at home. Return to work or school when you feel well enough.     Drink plenty of fluids to avoid dehydration.    For children: Use acetaminophen for fever, fussiness or discomfort. In infants over six months of age, you may use ibuprofen instead of acetaminophen. (NOTE: If your child has chronic liver or kidney disease or ever had a stomach ulcer or GI bleeding, talk with your doctor before using these medicines.) (NOTE: Aspirin should never be used in anyone under 18 years of age who is ill with a fever. It may cause severe liver  damage.)     For adults: You may use acetaminophen or ibuprofen to control pain or fever, unless another medicine was prescribed for this. (NOTE: If you have chronic liver or kidney disease or ever had a stomach ulcer or GI bleeding, talk with your doctor before using these medicines.)    Throat lozenges or numbing throat sprays can help reduce pain. Gargling with warm salt water will also help reduce throat pain. For this, dissolve 1/2 teaspoon of salt in 1 glass of warm water. To help soothe a sore throat, children can sip on juice or a popsicle. Children 5 years and older can also suck on a lollipop or hard candy.    Avoid salty or spicy foods, which can be irritating to the throat.  Follow-up care  Follow up with your healthcare provider or our staff if you are not improving over the next week.  When to seek medical advice  Call your healthcare provider right away if any of these occur:    Fever as directed by your doctor.  For children, seek care if:    Your child is of any age and has repeated fevers above 104 F (40 C).    Your child is younger than 2 years of age and has a fever of 100.4 F (38 C) that continues for more than 1 day.    Your child is 2 years old or older and has a fever of 100.4 F (38 C) that continues for more than 3 days.    New or worsening ear pain, sinus pain, or headache    Painful lumps in the back of neck    Stiff neck    Lymph nodes are getting larger    Inability to swallow liquids, excessive drooling, or inability to open mouth wide due to throat pain    Signs of dehydration (very dark urine or no urine, sunken eyes, dizziness)    Trouble breathing or noisy breathing    Muffled voice    New rash    Child appears to be getting sicker  Date Last Reviewed: 4/13/2015 2000-2017 [employer]. [address] [place] [address], [place] [address]. All rights reserved. This information is not intended as a substitute for professional medical care. Always follow your healthcare  "professional's instructions.                Follow-ups after your visit        Follow-up notes from your care team     Return if symptoms worsen or fail to improve.      Your next 10 appointments already scheduled     Jan 12, 2018 10:00 AM CST   Return Visit with MD [facility] ([facility] [place])    [address]  [facility] [address]   [phone]              Who to contact     If you have questions or need follow up information about today's clinic visit or your schedule please contact [facility] directly at [phone].  Normal or non-critical lab and imaging results will be communicated to you by Leapforcehart, letter or phone within 4 business days after the clinic has received the results. If you do not hear from us within 7 days, please contact the clinic through Luc[employer] or phone. If you have a critical or abnormal lab result, we will notify you by phone as soon as possible.  Submit refill requests through [employer]stel or call your pharmacy and they will forward the refill request to us. Please allow 3 business days for your refill to be completed.          Additional Information About Your Visit        MyChart Information     [employer] gives you secure access to your electronic health record. If you see a primary care provider, you can also send messages to your care team and make appointments. If you have questions, please call your primary care clinic.  If you do not have a primary care provider, please call [phone] and they will assist you.        Care EveryWhere ID     This is your Care EveryWhere ID. This could be used by other organizations to access your [facility] medical records  [id number]        Your Vitals Were     Pulse Temperature Height Pulse Oximetry BMI (Body Mass Index)       70 98.2  F (36.8  C) (Oral) 5' 11\" (1.803 m) 98% 29.93 kg/m2        Blood Pressure from Last 3 Encounters:   11/02/17 116/70   10/12/17 " 104/57   08/17/17 112/72    Weight from Last 3 Encounters:   11/02/17 214 lb 9.6 oz (97.3 kg)   10/12/17 203 lb (92.1 kg)   08/17/17 203 lb (92.1 kg)              We Performed the Following     Beta strep group A culture     Strep, Rapid Screen          Today's Medication Changes          These changes are accurate as of: 11/2/17  1:49 PM.  If you have any questions, ask your nurse or doctor.               Start taking these medicines.        Dose/Directions    lidocaine (viscous) 2 % solution   Commonly known as:  XYLOCAINE   Used for:  Throat pain, Pharyngitis with viral syndrome   Started by:  David Hernandes MD        Dose:  15 mL   Take 15 mLs by mouth every 3 hours as needed for moderate pain swish and spit every 3-8 hours as needed; max 8 doses/24 hour period   Quantity:  100 mL   Refills:  1            Where to get your medicines      These medications were sent to [facility] [address] [place] [id number]    Hours:  24-hours Phone:  [phone]     lidocaine (viscous) 2 % solution                Primary Care Provider Office Phone # Fax #    Priscilla Murphy [phone] [address] [place] [address]        Equal Access to Services     [facility] AH: Hadii aad ku hadasho Soashali, waaxda luqadaha, qaybta kaalmada adeegyada, jamel crouch. So [facility] [phone].    ATENCIÓN: Si habla español, tiene a yanez disposición servicios gratuitos de asistencia lingüística. Huey al [phone].    We comply with applicable federal civil rights laws and [place] laws. We do not discriminate on the basis of race, color, national origin, age, disability, sex, sexual orientation, or gender identity.            Thank you!     Thank you for [facility]  [facility]. Our goal is always to provide you with excellent care. Hearing back  from our patients is one way we can continue to improve our services. Please take a few minutes to complete the written survey that you may receive in the mail after your visit with us. Thank you!             Your Updated Medication List - Protect others around you: Learn how to safely use, store and throw away your medicines at www.disposemymeds.org.          This list is accurate as of: 11/2/17  1:49 PM.  Always use your most recent med list.                   Brand Name Dispense Instructions for use Diagnosis    atorvastatin 40 MG tablet    LIPITOR    90 tablet    Take 1 tablet (40 mg) by mouth daily    Hyperlipidemia LDL goal <160       buprenorphine HCl-naloxone HCl 8-2 MG per film    SUBOXONE    60 Film    Place 1 Film under the tongue every 12 hours    Chronic pain syndrome, Continuous opioid dependence (H)       clonazePAM 1 MG tablet    klonoPIN    120 tablet    Take 1 tablet (1 mg) by mouth 4 times daily    Panic attack, BRIDGER (generalized anxiety disorder)       escitalopram 20 MG tablet    LEXAPRO    90 tablet    Take 1 tablet (20 mg) by mouth daily    BRIDGER (generalized anxiety disorder)       esomeprazole 20 MG CR capsule    nexIUM    30 capsule    Take 1 capsule (20 mg) by mouth every morning (before breakfast) Take 30-60 minutes before eating.        lidocaine (viscous) 2 % solution    XYLOCAINE    100 mL    Take 15 mLs by mouth every 3 hours as needed for moderate pain swish and spit every 3-8 hours as needed; max 8 doses/24 hour period    Throat pain, Pharyngitis with viral syndrome       methocarbamol 500 MG tablet    ROBAXIN    240 tablet    Take 1-2 tablets (500-1,000 mg) by mouth 4 times daily as needed for muscle spasms    Back muscle spasm       omega 3 1000 MG Caps     90 capsule    Take 1 g by mouth daily

## 2017-11-02 NOTE — PROGRESS NOTES
SUBJECTIVE:   Diego Meng is a 42 year old male who presents to clinic today for the following health issues:      RESPIRATORY SYMPTOMS      Duration: 2 days ago     Description  sore throat, cough, ear pain right, headache, fatigue/malaise, hoarse voice, myalgias and post nasal drainage    Severity: sore throat severe other symptoms-moderate     Accompanying signs and symptoms: nasal and chest congestion    History (predisposing factors):  strep exposure and exposure to smoke    Precipitating or alleviating factors: swallowing and talking     Therapies tried and outcome:  Tylenol effective     Diego is a 41 yo M w/ hx of chronic pain followed by pain management and BRIDGER who presents to clinic today with a 2-day hx of sore throat and upper respiratory symptoms. His wife and daughter had similar symptoms (daugther recovered 3 weeks ago and wife recovering now). He complains of sore throat, myalgia, fatigue, postnasal drainage but denies fever, SOB, chest pain, abdominal pain, dysuria, diarrhea, or rash. He has been using tylenol and throat spray for pain control. He has history of recurrent pharyngitis prior to tonsillectomy 5 years ago. He has not had flu vaccine this year yet.     Problem list and histories reviewed & adjusted, as indicated.  Additional history: as documented    Patient Active Problem List   Diagnosis     Postoperative back pain     Intractable back pain     Continuous opioid dependence (H)     Panic attack     BRIDGER (generalized anxiety disorder)     Hyperlipidemia LDL goal <160     Chronic pain syndrome     Encounter for long-term (current) use of high-risk medication     Past Surgical History:   Procedure Laterality Date     ARTHROSCOPY KNEE WITH MEDIAL MENISCECTOMY Left 11/28/2016    Procedure: ARTHROSCOPY KNEE WITH MEDIAL MENISCECTOMY;  Surgeon: Geovani Kaufman MD;  Location: [facility] OR     BACK SURGERY  5/2015    laminectomy, discectomy L4-L5       Social History   Substance Use  Topics     Smoking status: Former Smoker     Packs/day: 1.00     Years: 20.00     Types: Cigarettes     Quit date: 11/25/2015     Smokeless tobacco: Never Used     Alcohol use No     Family History   Problem Relation Age of Onset     DIABETES Father      DIABETES Paternal Grandfather      Hyperlipidemia Sister      Coronary Artery Disease No family hx of      Colon Cancer No family hx of      Prostate Cancer No family hx of          Current Outpatient Prescriptions   Medication Sig Dispense Refill     lidocaine, viscous, (XYLOCAINE) 2 % solution Take 15 mLs by mouth every 3 hours as needed for moderate pain swish and spit every 3-8 hours as needed; max 8 doses/24 hour period 100 mL 1     clonazePAM (KLONOPIN) 1 MG tablet Take 1 tablet (1 mg) by mouth 4 times daily 120 tablet 0     buprenorphine HCl-naloxone HCl (SUBOXONE) 8-2 MG per film Place 1 Film under the tongue every 12 hours 60 Film 2     escitalopram (LEXAPRO) 20 MG tablet Take 1 tablet (20 mg) by mouth daily 90 tablet 1     atorvastatin (LIPITOR) 40 MG tablet Take 1 tablet (40 mg) by mouth daily 90 tablet 1     esomeprazole (NEXIUM) 20 MG capsule Take 1 capsule (20 mg) by mouth every morning (before breakfast) Take 30-60 minutes before eating. 30 capsule 0     omega 3 1000 MG CAPS Take 1 g by mouth daily 90 capsule      methocarbamol (ROBAXIN) 500 MG tablet Take 1-2 tablets (500-1,000 mg) by mouth 4 times daily as needed for muscle spasms (Patient not taking: Reported on 11/2/2017) 240 tablet 3     Allergies   Allergen Reactions     Nsaids Rash         Reviewed and updated as needed this visit by clinical staffTobacco  Allergies  Meds  Problems  Med Hx  Surg Hx  Fam Hx  Soc Hx        Reviewed and updated as needed this visit by Provider  Allergies  Meds  Problems         ROS:  - please refer to HPI for complete ROS    OBJECTIVE:     /70 (BP Location: Right arm, Patient Position: Chair, Cuff Size: Adult Regular)  Pulse 70  Temp 98.2  F  "(36.8  C) (Oral)   5' 11\" (1.803 m)  Wt 214 lb 9.6 oz (97.3 kg)  SpO2 98%  BMI 29.93 kg/m2  Body mass index is 29.93 kg/(m^2).     GENERAL: healthy, alert and no distress  EYES: Normal conjunctivae, PERRLA, EOMI  HENT: ear canals and TM's normal, nose and mouth without ulcers or lesions, MMM, erythematous oropharynx but no exudates noted  NECK: no adenopathy, no asymmetry, masses, or scars  RESP: lungs clear to auscultation - no rales, rhonchi or wheezes  CV: regular rate and rhythm, normal S1 S2, no S3 or S4, no murmur, click or rub, no peripheral edema and peripheral pulses strong  MS: no gross musculoskeletal defects noted, no edema  SKIN: no suspicious lesions or rashes  NEURO: Normal strength and tone, mentation intact and speech normal    Diagnostic Test Results:  Strep screen - Negative    ASSESSMENT/PLAN:   1. Throat pain  Patient's main complaint is sore throat and has been using tylenol and throat spray. He has history of recurrent throat infection but has not had one since tonsillectomy 5 years ago. Daughter and wife had similar symptoms. Centor score shows that strep pharyngitis is very unlikely. Physical exam is also unremarkable other than erythematous oropharynx. His symptoms are likely secondary to viral pharyngitis and would need conservative management and time  - Strep, Rapid Screen  - Beta strep group A culture  - lidocaine, viscous, (XYLOCAINE) 2 % solution; Take 15 mLs by mouth every 3 hours as needed for moderate pain swish and spit every 3-8 hours as needed; max 8 doses/24 hour period  Dispense: 100 mL; Refill: 1  - warm water with honey  - continue using tylenol as needed  - humidified air    2. Pharyngitis with viral syndrome  He had other upper respiratory symptoms such as cough, post-nasal drainage with sore throat, which would take couple days for full recovery.   - lidocaine, viscous, (XYLOCAINE) 2 % solution; Take 15 mLs by mouth every 3 hours as needed for moderate pain swish " and spit every 3-8 hours as needed; max 8 doses/24 hour period  Dispense: 100 mL; Refill: 1  - conservative management as above    FUTURE APPOINTMENTS:       - Follow-up visit as needed    Patient was discussed with Dr. El, who agrees with the plan above    David Hernandes MD  [facility]    I have discussed this patient with Dr. Hernandes and agree with joint documentation and plan as noted above.    Prince El MD  Attending Internal Medicine/Pediatrics Physician

## 2017-11-02 NOTE — NURSING NOTE
"Chief Complaint   Patient presents with     Pharyngitis       Initial /70 (BP Location: Right arm, Patient Position: Chair, Cuff Size: Adult Regular)  Pulse 70  Temp 98.2  F (36.8  C) (Oral)  Ht 5' 11\" (1.803 m)  Wt 214 lb 9.6 oz (97.3 kg)  SpO2 98%  BMI 29.93 kg/m2 Estimated body mass index is 29.93 kg/(m^2) as calculated from the following:    Height as of this encounter: 5' 11\" (1.803 m).    Weight as of this encounter: 214 lb 9.6 oz (97.3 kg).  Medication Reconciliation: complete   Trena Vallejo MA      "

## 2017-11-02 NOTE — PATIENT INSTRUCTIONS
- rapid strep test is negative; your symptoms are likely secondary viral infection  - can use flonase 1-2 sprays to each nostril daily  - humidified air to prevent dehydration  - warm water with honey for cough and sore throat  - continue using tylenol as needed for pain  - xylocaine solution has been prescribed; use this every 3 hours as needed for sore throat; gargle in your mouth for couple minutes and discard  - if symptoms do not improve in 2-3 weeks, please call our clinic and re-evaluate      Viral Pharyngitis (Sore Throat)    You (or your child, if your child is the patient) have pharyngitis (sore throat). This infection is caused by a virus. It can cause throat pain that is worse when swallowing, aching all over, headache, and fever. The infection may be spread by coughing, kissing, or touching others after touching your mouth or nose. Antibiotic medications do not work against viruses, so they are not used for treating this condition.  Home care    If your symptoms are severe, rest at home. Return to work or school when you feel well enough.     Drink plenty of fluids to avoid dehydration.    For children: Use acetaminophen for fever, fussiness or discomfort. In infants over six months of age, you may use ibuprofen instead of acetaminophen. (NOTE: If your child has chronic liver or kidney disease or ever had a stomach ulcer or GI bleeding, talk with your doctor before using these medicines.) (NOTE: Aspirin should never be used in anyone under 18 years of age who is ill with a fever. It may cause severe liver damage.)     For adults: You may use acetaminophen or ibuprofen to control pain or fever, unless another medicine was prescribed for this. (NOTE: If you have chronic liver or kidney disease or ever had a stomach ulcer or GI bleeding, talk with your doctor before using these medicines.)    Throat lozenges or numbing throat sprays can help reduce pain. Gargling with warm salt water will also help reduce  throat pain. For this, dissolve 1/2 teaspoon of salt in 1 glass of warm water. To help soothe a sore throat, children can sip on juice or a popsicle. Children 5 years and older can also suck on a lollipop or hard candy.    Avoid salty or spicy foods, which can be irritating to the throat.  Follow-up care  Follow up with your healthcare provider or our staff if you are not improving over the next week.  When to seek medical advice  Call your healthcare provider right away if any of these occur:    Fever as directed by your doctor.  For children, seek care if:    Your child is of any age and has repeated fevers above 104 F (40 C).    Your child is younger than 2 years of age and has a fever of 100.4 F (38 C) that continues for more than 1 day.    Your child is 2 years old or older and has a fever of 100.4 F (38 C) that continues for more than 3 days.    New or worsening ear pain, sinus pain, or headache    Painful lumps in the back of neck    Stiff neck    Lymph nodes are getting larger    Inability to swallow liquids, excessive drooling, or inability to open mouth wide due to throat pain    Signs of dehydration (very dark urine or no urine, sunken eyes, dizziness)    Trouble breathing or noisy breathing    Muffled voice    New rash    Child appears to be getting sicker  Date Last Reviewed: 4/13/2015 2000-2017 [employer] [address] [place] [address]. All rights reserved. This information is not intended as a substitute for professional medical care. Always follow your healthcare professional's instructions.

## 2017-11-03 LAB
BACTERIA SPEC CULT: NORMAL
SPECIMEN SOURCE: NORMAL

## 2017-11-06 ENCOUNTER — MYC MEDICAL ADVICE (OUTPATIENT)
Dept: PEDIATRICS | Facility: CLINIC | Age: 42
End: 2017-11-06

## 2017-11-10 ENCOUNTER — OFFICE VISIT (OUTPATIENT)
Dept: PEDIATRICS | Facility: CLINIC | Age: 42
End: 2017-11-10
Payer: COMMERCIAL

## 2017-11-10 VITALS
OXYGEN SATURATION: 98 % | HEART RATE: 76 BPM | DIASTOLIC BLOOD PRESSURE: 62 MMHG | WEIGHT: 209.5 LBS | BODY MASS INDEX: 29.33 KG/M2 | SYSTOLIC BLOOD PRESSURE: 122 MMHG | TEMPERATURE: 98.2 F | HEIGHT: 71 IN

## 2017-11-10 DIAGNOSIS — H65.02 ACUTE SEROUS OTITIS MEDIA OF LEFT EAR, RECURRENCE NOT SPECIFIED: Primary | ICD-10-CM

## 2017-11-10 PROCEDURE — 99213 OFFICE O/P EST LOW 20 MIN: CPT | Performed by: PHYSICIAN ASSISTANT

## 2017-11-10 ASSESSMENT — PAIN SCALES - GENERAL: PAINLEVEL: MODERATE PAIN (4)

## 2017-11-10 NOTE — NURSING NOTE
"Chief Complaint   Patient presents with     Ear Problem     ear pain       Initial /62 (BP Location: Right arm, Patient Position: Chair, Cuff Size: Adult Regular)  Pulse 76  Temp 98.2  F (36.8  C) (Oral)  Ht 5' 11\" (1.803 m)  Wt 209 lb 8 oz (95 kg)  SpO2 98%  BMI 29.22 kg/m2 Estimated body mass index is 29.22 kg/(m^2) as calculated from the following:    Height as of this encounter: 5' 11\" (1.803 m).    Weight as of this encounter: 209 lb 8 oz (95 kg).  Medication Reconciliation: complete   Nahed Loza MA 3:37 PM 11/10/2017     "

## 2017-11-10 NOTE — PROGRESS NOTES
"  SUBJECTIVE:   Diego Meng is a 42 year old male who presents to clinic today for the following health issues:    Acute Illness   Acute illness concerns: Ear pain  Onset: 2 weeks    Fever: no     Chills/Sweats: no     Headache (location?): YES- back of head    Sinus Pressure:YES    Conjunctivitis:  no    Ear Pain: YES: left, feels jammed \"can only hear about 20% with it), ringing in both ears, but stronger in the left.    Rhinorrhea: YES    Congestion: YES    Sore Throat: no      Cough: no    Wheeze: no     Decreased Appetite: no     Nausea: no     Vomiting: no     Diarrhea:  no     Dysuria/Freq.: no     Fatigue/Achiness: YES, fatigue    Sick/Strep Exposure: no      Therapies Tried and outcome: Hot tea, Mucinex, no treatments tried for the ear  ROS:  ROS otherwise negative    OBJECTIVE:                                                    /62 (BP Location: Right arm, Patient Position: Chair, Cuff Size: Adult Regular)  Pulse 76  Temp 98.2  F (36.8  C) (Oral)  Ht 5' 11\" (1.803 m)  Wt 209 lb 8 oz (95 kg)  SpO2 98%  BMI 29.22 kg/m2  Body mass index is 29.22 kg/(m^2).   GENERAL:alert, no distress  HENT: ear canals- normal; TMs- erythemic and bulging TM on left; Nose- normal; Mouth- no ulcers, no lesions  NECK: ant LAD  RESP: lungs clear to auscultation - no rales, no rhonchi, no wheezes  CV: regular rates and rhythm, normal S1 S2, no S3 or S4 and no murmur, no click or rub    Diagnostic test results:  No results found for this or any previous visit (from the past 24 hour(s)).       ASSESSMENT/PLAN:                                                    (H65.02) Acute serous otitis media of left ear, recurrence not specified  (primary encounter diagnosis)  Comment: begin antibiotics.   Plan: amoxicillin-clavulanate (AUGMENTIN) 875-125 MG         per tablet          See Patient Instructions    Lisbet Thakkar PA-C  [facility]  "

## 2017-11-10 NOTE — MR AVS SNAPSHOT
After Visit Summary   11/10/2017    Diego Meng    MRN: [id number]           Patient Information     Date Of Birth          1975        Visit Information        Provider Department      11/10/2017 3:10 PM Lisbet Thakkar PA-C [facility]        Today's Diagnoses     Acute serous otitis media of left ear, recurrence not specified    -  1      Care Instructions    Begin antibiotics   Increase fluid intake            Follow-ups after your visit        Your next 10 appointments already scheduled     Jan 12, 2018 10:00 AM CST   Return Visit with Reba Vallecillo MD   [facility] ([facility])    [address]  [address]  [place] [id number]   [phone]              Who to contact     If you have questions or need follow up information about today's clinic visit or your schedule please contact [facility] directly at [phone].  Normal or non-critical lab and imaging results will be communicated to you by MyChart, letter or phone within 4 business days after the clinic has received the results. If you do not hear from us within 7 days, please contact the clinic through [employer] or phone. If you have a critical or abnormal lab result, we will notify you by phone as soon as possible.  Submit refill requests through [employer] or call your pharmacy and they will forward the refill request to us. Please allow 3 business days for your refill to be completed.          Additional Information About Your Visit        MyChart Information     [employer] gives you secure access to your electronic health record. If you see a primary care provider, you can also send messages to your care team and make appointments. If you have questions, please call your primary care clinic.  If you do not have a primary care provider, please call [phone] and they will assist you.        Care EveryWhere ID     This is your Care EveryWhere ID.  "This could be used by other organizations to access your [facility] medical records  [id number]        Your Vitals Were     Pulse Temperature Height Pulse Oximetry BMI (Body Mass Index)       76 98.2  F (36.8  C) (Oral) 5' 11\" (1.803 m) 98% 29.22 kg/m2        Blood Pressure from Last 3 Encounters:   11/10/17 122/62   11/02/17 116/70   10/12/17 104/57    Weight from Last 3 Encounters:   11/10/17 209 lb 8 oz (95 kg)   11/02/17 214 lb 9.6 oz (97.3 kg)   10/12/17 203 lb (92.1 kg)              Today, you had the following     No orders found for display         Today's Medication Changes          These changes are accurate as of: 11/10/17  3:42 PM.  If you have any questions, ask your nurse or doctor.               Start taking these medicines.        Dose/Directions    amoxicillin-clavulanate 875-125 MG per tablet   Commonly known as:  AUGMENTIN   Used for:  Acute serous otitis media of left ear, recurrence not specified   Started by:  Lisbet Thakkar PA-C        Dose:  1 tablet   Take 1 tablet by mouth 2 times daily   Quantity:  20 tablet   Refills:  0            Where to get your medicines      These medications were sent to [facility] [address] [place] [id number]    Hours:  24-hours Phone:  [phone]     amoxicillin-clavulanate 875-125 MG per tablet                Primary Care Provider Office Phone # Fax #    Priscilla Murphy [phone] [address] [place] [address]        Equal Access to Services     [facility] TIM AH: Hadii em Eaton, wavikda luwilliamadaha, qaybta kaalmada rohan, jamel crouch. So [facility] [phone].    ATENCIÓN: Si habla español, tiene a yanez disposición servicios gratuitos de asistencia lingüística. Llame al [phone].    We comply with applicable federal civil rights laws and [place] laws. We do not " discriminate on the basis of race, color, national origin, age, disability, sex, sexual orientation, or gender identity.            Thank you!     Thank you for choosing [facility]  for your care. Our goal is always to provide you with excellent care. Hearing back from our patients is one way we can continue to improve our services. Please take a few minutes to complete the written survey that you may receive in the mail after your visit with us. Thank you!             Your Updated Medication List - Protect others around you: Learn how to safely use, store and throw away your medicines at www.disposemymeds.org.          This list is accurate as of: 11/10/17  3:42 PM.  Always use your most recent med list.                   Brand Name Dispense Instructions for use Diagnosis    amoxicillin-clavulanate 875-125 MG per tablet    AUGMENTIN    20 tablet    Take 1 tablet by mouth 2 times daily    Acute serous otitis media of left ear, recurrence not specified       atorvastatin 40 MG tablet    LIPITOR    90 tablet    Take 1 tablet (40 mg) by mouth daily    Hyperlipidemia LDL goal <160       buprenorphine HCl-naloxone HCl 8-2 MG per film    SUBOXONE    60 Film    Place 1 Film under the tongue every 12 hours    Chronic pain syndrome, Continuous opioid dependence (H)       clonazePAM 1 MG tablet    klonoPIN    120 tablet    Take 1 tablet (1 mg) by mouth 4 times daily    Panic attack, BRIDGER (generalized anxiety disorder)       escitalopram 20 MG tablet    LEXAPRO    90 tablet    Take 1 tablet (20 mg) by mouth daily    BRIDGER (generalized anxiety disorder)       esomeprazole 20 MG CR capsule    nexIUM    30 capsule    Take 1 capsule (20 mg) by mouth every morning (before breakfast) Take 30-60 minutes before eating.        lidocaine (viscous) 2 % solution    XYLOCAINE    100 mL    Take 15 mLs by mouth every 3 hours as needed for moderate pain swish and spit every 3-8 hours as needed; max 8 doses/24 hour period    Throat  pain, Pharyngitis with viral syndrome       methocarbamol 500 MG tablet    ROBAXIN    240 tablet    Take 1-2 tablets (500-1,000 mg) by mouth 4 times daily as needed for muscle spasms    Back muscle spasm       omega 3 1000 MG Caps     90 capsule    Take 1 g by mouth daily

## 2017-11-21 ENCOUNTER — OFFICE VISIT (OUTPATIENT)
Dept: PEDIATRICS | Facility: CLINIC | Age: 42
End: 2017-11-21
Payer: COMMERCIAL

## 2017-11-21 VITALS
HEART RATE: 83 BPM | BODY MASS INDEX: 29.55 KG/M2 | WEIGHT: 211.1 LBS | DIASTOLIC BLOOD PRESSURE: 62 MMHG | HEIGHT: 71 IN | TEMPERATURE: 97.5 F | SYSTOLIC BLOOD PRESSURE: 116 MMHG | OXYGEN SATURATION: 97 %

## 2017-11-21 DIAGNOSIS — H92.02 LEFT EAR PAIN: Primary | ICD-10-CM

## 2017-11-21 PROCEDURE — 99213 OFFICE O/P EST LOW 20 MIN: CPT | Performed by: NURSE PRACTITIONER

## 2017-11-21 NOTE — PROGRESS NOTES
"HPI Review of SystemsPhysical Exam     SUBJECTIVE:   Diego Meng is a 42 year old male who presents to clinic today for the following health issues:    Patient declines vaccines today.    Patient here for recheck of ears after infection - last seen by RAHEL Thakkar 11/10/17.  Patient states just finished 10 days of augmentin, still having problems with left ear - pain, can't hear well, popping..:    No fever.     ROS: const/heent/resp otherwise negative     OBJECTIVE:  /62 (Cuff Size: Adult Large)  Pulse 83  Temp 97.5  F (36.4  C) (Tympanic)  Ht 5' 11\" (1.803 m)  Wt 211 lb 1.6 oz (95.8 kg)  SpO2 97%  BMI 29.44 kg/m2  CONSTITUTIONAL: Alert, well-nourished, well-groomed, NAD  RESP: Lungs CTA. No wheeze, rhonchi, rales.  CV: HRRR S1 S2 No MRG. No peripheral edema  HEENT: Eyes: Conjunctiva pink and moist. Ears: Ear canals unremarkable. Left TM slightly retracted. No erythema or purulent DC behind the drum.  Nose: Turbinates pink and moist. Throat: OP pink and moist. No tonsillar enlargement or exudates. No postnasal drip.      ASSESSMENT/PLAN:  (H92.02) Left ear pain  (primary encounter diagnosis)  Comment: Likely eustachian tube dysfunction with retracted TM after an episode of AOM. Already on flonase and doing sinus rinses.   Plan: OTOLARYNGOLOGY REFERRAL        Referred to ENT.      LIONEL Schulz-DEMARCUS.          "

## 2017-11-21 NOTE — MR AVS SNAPSHOT
After Visit Summary   11/21/2017    Diego Meng    MRN: [id number]           Patient Information     Date Of Birth          1975        Visit Information        Provider Department      11/21/2017 10:00 AM Priscilla Son APRN CNP [facility]        Today's Diagnoses     Left ear pain    -  1       Follow-ups after your visit        Additional Services     OTOLARYNGOLOGY REFERRAL       Your provider has referred you to: N: [facility] [place] [phone]   [email or web address]    Please be aware that coverage of these services is subject to the terms and limitations of your health insurance plan.  Call member services at your health plan with any benefit or coverage questions.      Please bring the following with you to your appointment:    (1) Any X-Rays, CTs or MRIs which have been performed.  Contact the facility where they were done to arrange for  prior to your scheduled appointment.   (2) List of current medications  (3) This referral request   (4) Any documents/labs given to you for this referral                  Your next 10 appointments already scheduled     Jan 12, 2018 10:00 AM CST   Return Visit with Reba Vallecillo MD   [facility] ([facility])    [address] [place] [address]   [phone]              Who to contact     If you have questions or need follow up information about today's clinic visit or your schedule please contact [facility] directly at [phone].  Normal or non-critical lab and imaging results will be communicated to you by MyChart, letter or phone within 4 business days after the clinic has received the results. If you do not hear from us within 7 days, please contact the clinic through MyChart or phone. If you have a critical or abnormal lab result, we will notify you by phone as soon as  "possible.  Submit refill requests through [employer] or call your pharmacy and they will forward the refill request to us. Please allow 3 business days for your refill to be completed.          Additional Information About Your Visit        myhubhart Information     [employer] gives you secure access to your electronic health record. If you see a primary care provider, you can also send messages to your care team and make appointments. If you have questions, please call your primary care clinic.  If you do not have a primary care provider, please call [phone] and they will assist you.        Care EveryWhere ID     This is your Care EveryWhere ID. This could be used by other organizations to access your [facility] medical records  [id number]        Your Vitals Were     Pulse Temperature Height Pulse Oximetry BMI (Body Mass Index)       83 97.5  F (36.4  C) (Tympanic) 5' 11\" (1.803 m) 97% 29.44 kg/m2        Blood Pressure from Last 3 Encounters:   11/21/17 116/62   11/10/17 122/62   11/02/17 116/70    Weight from Last 3 Encounters:   11/21/17 211 lb 1.6 oz (95.8 kg)   11/10/17 209 lb 8 oz (95 kg)   11/02/17 214 lb 9.6 oz (97.3 kg)              We Performed the Following     OTOLARYNGOLOGY REFERRAL        Primary Care Provider Office Phone # Fax #    Priscilla uMrphy [phone] [address] [place] [address]        Equal Access to Services     [facility] AH: Hadii aad ku hadasho Soomaali, waaxda luqadaha, qaybta kaalmada adeegyada, [employer] jeri alexander . So [facility] [phone].    ATENCIÓN: Si habla español, tiene a yanez disposición servicios gratuitos de asistencia lingüística. Huey al [phone].    We comply with applicable federal civil rights laws and [place] laws. We do not discriminate on the basis of race, color, national origin, age, disability, sex, sexual orientation, or gender identity.            Thank you!     Thank you for choosing [facility]  for your care. Our goal is always to provide you with excellent care. Hearing back from our patients is one way we can continue to improve our services. Please take a few minutes to complete the written survey that you may receive in the mail after your visit with us. Thank you!             Your Updated Medication List - Protect others around you: Learn how to safely use, store and throw away your medicines at www.disposemymeds.org.          This list is accurate as of: 11/21/17 10:22 AM.  Always use your most recent med list.                   Brand Name Dispense Instructions for use Diagnosis    amoxicillin-clavulanate 875-125 MG per tablet    AUGMENTIN    20 tablet    Take 1 tablet by mouth 2 times daily    Acute serous otitis media of left ear, recurrence not specified       atorvastatin 40 MG tablet    LIPITOR    90 tablet    Take 1 tablet (40 mg) by mouth daily    Hyperlipidemia LDL goal <160       buprenorphine HCl-naloxone HCl 8-2 MG per film    SUBOXONE    60 Film    Place 1 Film under the tongue every 12 hours    Chronic pain syndrome, Continuous opioid dependence (H)       clonazePAM 1 MG tablet    klonoPIN    120 tablet    Take 1 tablet (1 mg) by mouth 4 times daily    Panic attack, BRIDGER (generalized anxiety disorder)       escitalopram 20 MG tablet    LEXAPRO    90 tablet    Take 1 tablet (20 mg) by mouth daily    BRIDGER (generalized anxiety disorder)       esomeprazole 20 MG CR capsule    nexIUM    30 capsule    Take 1 capsule (20 mg) by mouth every morning (before breakfast) Take 30-60 minutes before eating.        lidocaine (viscous) 2 % solution    XYLOCAINE    100 mL    Take 15 mLs by mouth every 3 hours as needed for moderate pain swish and spit every 3-8 hours as needed; max 8 doses/24 hour period    Throat pain, Pharyngitis with viral syndrome       methocarbamol 500 MG tablet    ROBAXIN    240 tablet    Take 1-2 tablets (500-1,000 mg) by mouth 4 times daily as needed for muscle  spasms    Back muscle spasm       omega 3 1000 MG Caps     90 capsule    Take 1 g by mouth daily

## 2017-11-30 DIAGNOSIS — F41.0 PANIC ATTACK: ICD-10-CM

## 2017-11-30 DIAGNOSIS — F41.1 GAD (GENERALIZED ANXIETY DISORDER): ICD-10-CM

## 2017-11-30 RX ORDER — CLONAZEPAM 1 MG/1
1 TABLET ORAL 4 TIMES DAILY
Qty: 120 TABLET | Refills: 0 | Status: SHIPPED | OUTPATIENT
Start: 2017-11-30 | End: 2018-01-02

## 2017-11-30 NOTE — TELEPHONE ENCOUNTER
Pt LM at 12:10 pm requesting refill on klonopin, requesting us to leave it with FD when ready. Need to notify pt at [phone] when ready.     Klonopin 1 mg      Last Written Prescription Date:  11/01/17  Last Fill Quantity: 120,   # refills: 0  Last Office Visit: 11/21/17  Future Office visit:    Next 5 appointments (look out 90 days)     Jan 12, 2018 10:00 AM CST   Return Visit with Reba Vallecillo MD   [facility] ([facility])    [address] [place] [id number]   [phone]                   Routing refill request to provider for review/approval because:  Drug not on the [facility], P or [facility] refill protocol or controlled substance    Parish, RN  Triage Nurse

## 2017-11-30 NOTE — TELEPHONE ENCOUNTER
Printed, signed, in my outbox.    Priscilla Murphy MD  Internal Medicine/Pediatrics  [facility]

## 2018-01-02 DIAGNOSIS — F41.0 PANIC ATTACK: ICD-10-CM

## 2018-01-02 DIAGNOSIS — F41.1 GAD (GENERALIZED ANXIETY DISORDER): ICD-10-CM

## 2018-01-02 DIAGNOSIS — E78.5 HYPERLIPIDEMIA LDL GOAL <160: ICD-10-CM

## 2018-01-02 RX ORDER — ESCITALOPRAM OXALATE 20 MG/1
20 TABLET ORAL DAILY
Qty: 30 TABLET | Refills: 0 | Status: SHIPPED | OUTPATIENT
Start: 2018-01-02 | End: 2018-01-05

## 2018-01-02 RX ORDER — ATORVASTATIN CALCIUM 40 MG/1
40 TABLET, FILM COATED ORAL DAILY
Qty: 30 TABLET | Refills: 0 | Status: SHIPPED | OUTPATIENT
Start: 2018-01-02 | End: 2018-01-05

## 2018-01-02 RX ORDER — CLONAZEPAM 1 MG/1
1 TABLET ORAL 4 TIMES DAILY
Qty: 120 TABLET | Refills: 0 | Status: SHIPPED | OUTPATIENT
Start: 2018-01-02 | End: 2018-01-05

## 2018-01-05 ENCOUNTER — OFFICE VISIT (OUTPATIENT)
Dept: PEDIATRICS | Facility: CLINIC | Age: 43
End: 2018-01-05
Payer: COMMERCIAL

## 2018-01-05 VITALS
WEIGHT: 206.7 LBS | OXYGEN SATURATION: 97 % | DIASTOLIC BLOOD PRESSURE: 64 MMHG | HEIGHT: 70 IN | SYSTOLIC BLOOD PRESSURE: 106 MMHG | BODY MASS INDEX: 29.59 KG/M2 | TEMPERATURE: 97.9 F | HEART RATE: 78 BPM

## 2018-01-05 DIAGNOSIS — F41.1 GAD (GENERALIZED ANXIETY DISORDER): ICD-10-CM

## 2018-01-05 DIAGNOSIS — E78.5 HYPERLIPIDEMIA LDL GOAL <160: ICD-10-CM

## 2018-01-05 DIAGNOSIS — F41.0 PANIC ATTACK: ICD-10-CM

## 2018-01-05 DIAGNOSIS — Z00.00 HEALTHCARE MAINTENANCE: Primary | ICD-10-CM

## 2018-01-05 DIAGNOSIS — Z23 NEED FOR VACCINATION: ICD-10-CM

## 2018-01-05 LAB
ALBUMIN SERPL-MCNC: 4.2 G/DL (ref 3.4–5)
ALP SERPL-CCNC: 53 U/L (ref 40–150)
ALT SERPL W P-5'-P-CCNC: 81 U/L (ref 0–70)
ANION GAP SERPL CALCULATED.3IONS-SCNC: 6 MMOL/L (ref 3–14)
AST SERPL W P-5'-P-CCNC: 28 U/L (ref 0–45)
BILIRUB SERPL-MCNC: 0.3 MG/DL (ref 0.2–1.3)
BUN SERPL-MCNC: 15 MG/DL (ref 7–30)
CALCIUM SERPL-MCNC: 9 MG/DL (ref 8.5–10.1)
CHLORIDE SERPL-SCNC: 107 MMOL/L (ref 94–109)
CHOLEST SERPL-MCNC: 184 MG/DL
CO2 SERPL-SCNC: 29 MMOL/L (ref 20–32)
CREAT SERPL-MCNC: 0.78 MG/DL (ref 0.66–1.25)
GFR SERPL CREATININE-BSD FRML MDRD: >90 ML/MIN/1.7M2
GLUCOSE SERPL-MCNC: 89 MG/DL (ref 70–99)
HDLC SERPL-MCNC: 39 MG/DL
LDLC SERPL CALC-MCNC: 122 MG/DL
NONHDLC SERPL-MCNC: 145 MG/DL
POTASSIUM SERPL-SCNC: 4.2 MMOL/L (ref 3.4–5.3)
PROT SERPL-MCNC: 7.4 G/DL (ref 6.8–8.8)
SODIUM SERPL-SCNC: 142 MMOL/L (ref 133–144)
TRIGL SERPL-MCNC: 116 MG/DL

## 2018-01-05 PROCEDURE — 99396 PREV VISIT EST AGE 40-64: CPT | Mod: 25 | Performed by: NURSE PRACTITIONER

## 2018-01-05 PROCEDURE — 36415 COLL VENOUS BLD VENIPUNCTURE: CPT | Performed by: NURSE PRACTITIONER

## 2018-01-05 PROCEDURE — 80061 LIPID PANEL: CPT | Performed by: NURSE PRACTITIONER

## 2018-01-05 PROCEDURE — 90715 TDAP VACCINE 7 YRS/> IM: CPT | Performed by: NURSE PRACTITIONER

## 2018-01-05 PROCEDURE — 80053 COMPREHEN METABOLIC PANEL: CPT | Performed by: NURSE PRACTITIONER

## 2018-01-05 PROCEDURE — 90471 IMMUNIZATION ADMIN: CPT | Performed by: NURSE PRACTITIONER

## 2018-01-05 RX ORDER — ESCITALOPRAM OXALATE 20 MG/1
20 TABLET ORAL DAILY
Qty: 90 TABLET | Refills: 3 | Status: SHIPPED | OUTPATIENT
Start: 2018-01-05

## 2018-01-05 RX ORDER — CLONAZEPAM 1 MG/1
1 TABLET ORAL 4 TIMES DAILY
Qty: 120 TABLET | Refills: 0 | Status: SHIPPED | OUTPATIENT
Start: 2018-01-05 | End: 2018-01-05

## 2018-01-05 RX ORDER — ATORVASTATIN CALCIUM 40 MG/1
40 TABLET, FILM COATED ORAL DAILY
Qty: 90 TABLET | Refills: 3 | Status: SHIPPED | OUTPATIENT
Start: 2018-01-05 | End: 2019-01-29

## 2018-01-05 NOTE — Clinical Note
Hi there,  I've got a patient interested in weaning his clonazepam. He currently takes 1mg QID! He is super med focused, so I think it will take him probably years to come off.   I know, in the past, you have recommended switching to valium in circumstances like this. Would you be able to tell me what dose of valium and how often you would start with, then how much go go down by and how often?  Thanks!  Priscilla

## 2018-01-05 NOTE — NURSING NOTE
"Chief Complaint   Patient presents with     Physical       Initial /64 (BP Location: Right arm, Cuff Size: Adult Large)  Pulse 78  Temp 97.9  F (36.6  C) (Tympanic)  Ht 5' 10.25\" (1.784 m)  Wt 206 lb 11.2 oz (93.8 kg)  SpO2 97%  BMI 29.45 kg/m2 Estimated body mass index is 29.45 kg/(m^2) as calculated from the following:    Height as of this encounter: 5' 10.25\" (1.784 m).    Weight as of this encounter: 206 lb 11.2 oz (93.8 kg).  Medication Reconciliation: complete   Cally Dubon CMA    "

## 2018-01-05 NOTE — PATIENT INSTRUCTIONS
1. The untethered soul, mindfulness based stress reduction  2. I'll ask about weaning from psychiatry and let you know  3. I'll get back to you about labs        Preventive Health Recommendations  Male Ages 40 to 49    Yearly exam:             See your health care provider every year in order to  o   Review health changes.   o   Discuss preventive care.    o   Review your medicines if your doctor has prescribed any.    You should be tested each year for STDs (sexually transmitted diseases) if you re at risk.     Have a cholesterol test every 5 years.     Have a colonoscopy (test for colon cancer) if someone in your family has had colon cancer or polyps before age 50.     After age 45, have a diabetes test (fasting glucose). If you are at risk for diabetes, you should have this test every 3 years.      Talk with your health care provider about whether or not a prostate cancer screening test (PSA) is right for you.    Shots: Get a flu shot each year. Get a tetanus shot every 10 years.     Nutrition:    Eat at least 5 servings of fruits and vegetables daily.     Eat whole-grain bread, whole-wheat pasta and brown rice instead of white grains and rice.     Talk to your provider about Calcium and Vitamin D.     Lifestyle    Exercise for at least 150 minutes a week (30 minutes a day, 5 days a week). This will help you control your weight and prevent disease.     Limit alcohol to one drink per day.     No smoking.     Wear sunscreen to prevent skin cancer.     See your dentist every six months for an exam and cleaning.

## 2018-01-05 NOTE — PROGRESS NOTES
SUBJECTIVE:   CC: Diego Meng is an 42 year old male who presents for preventative health visit.     Physical   Annual:     Getting at least 3 servings of Calcium per day::  Yes    Bi-annual eye exam::  NO    Dental care twice a year::  Yes    Sleep apnea or symptoms of sleep apnea::  None    Diet::  Regular (no restrictions)    Frequency of exercise::  6-7 days/week    Duration of exercise::  30-45 minutes    Taking medications regularly::  Yes    Medication side effects::  Other (drowsiness)    Additional concerns today::  YES (medications)            -------------------------------------    Today's PHQ-2 Score:   PHQ-2 ( 1999 Pfizer) 1/5/2018   Q1: Little interest or pleasure in doing things 0   Q2: Feeling down, depressed or hopeless 0   PHQ-2 Score 0     Abuse: Current or Past(Physical, Sexual or Emotional)- No  Do you feel safe in your environment - Yes    Social History   Substance Use Topics     Smoking status: Former Smoker     Packs/day: 1.00     Years: 20.00     Types: Cigarettes     Quit date: 11/25/2015     Smokeless tobacco: Never Used     Alcohol use No     No flowsheet data found.No flowsheet data found.      Last PSA: No results found for: PSA    Reviewed orders with patient. Reviewed health maintenance and updated orders accordingly - Yes  Labs reviewed in EPIC    Reviewed and updated as needed this visit by clinical staff  Tobacco  Allergies  Meds  Med Hx  Surg Hx  Fam Hx  Soc Hx        Reviewed and updated as needed this visit by Provider            Review of Systems  C: NEGATIVE for fever, chills, change in weight  I: NEGATIVE for worrisome rashes, moles or lesions  E: NEGATIVE for vision changes or irritation  ENT: NEGATIVE for ear, mouth and throat problems  R: NEGATIVE for significant cough or SOB  CV: NEGATIVE for chest pain, palpitations or peripheral edema  GI: NEGATIVE for nausea, abdominal pain, heartburn, or change in bowel habits   male: negative for dysuria,  "hematuria, decreased urinary stream, erectile dysfunction, urethral discharge  M: NEGATIVE for significant arthralgias or myalgia  N: NEGATIVE for weakness, dizziness or paresthesias  P: NEGATIVE for changes in mood or affect    OBJECTIVE:   /64 (BP Location: Right arm, Cuff Size: Adult Large)  Pulse 78  Temp 97.9  F (36.6  C) (Tympanic)  Ht 5' 10.25\" (1.784 m)  Wt 206 lb 11.2 oz (93.8 kg)  SpO2 97%  BMI 29.45 kg/m2    Physical Exam  GENERAL: healthy, alert and no distress  EYES: Eyes grossly normal to inspection, PERRL and conjunctivae and sclerae normal  HENT: ear canals and TM's normal, nose and mouth without ulcers or lesions  NECK: no adenopathy, no asymmetry, masses, or scars and thyroid normal to palpation  RESP: lungs clear to auscultation - no rales, rhonchi or wheezes  CV: regular rate and rhythm, normal S1 S2, no S3 or S4, no murmur, click or rub, no peripheral edema and peripheral pulses strong  ABDOMEN: soft, nontender, no hepatosplenomegaly, no masses and bowel sounds normal  MS: no gross musculoskeletal defects noted, no edema  SKIN: no suspicious lesions or rashes  NEURO: Normal strength and tone, mentation intact and speech normal  PSYCH: mentation appears normal, affect normal/bright    ASSESSMENT/PLAN:   1. Healthcare maintenance  - Lipid panel reflex to direct LDL Fasting  - Comprehensive metabolic panel    2. Need for vaccination  - TDAP VACCINE (ADACEL)  - ADMIN 1st VACCINE  - SCREENING QUESTIONS FOR ADULT IMMUNIZATIONS    3. BRIDGER (generalized anxiety disorder)  Ongoing for years. I think, based on his pain med usage and what he tells me about his anxiety, he is using chemical coping. Discussed the risks of long term benzos, especially with opioids, and that I'm not comfortable with this high of a dose. I will fill while his PCP is out but not after. He did say he is interested in weaning. I recommended a very slow Valium taper (over the course of a few years). I will contact Dr." "Adson for a recommended tapering schedule. He agrees to consider this in the future.  - escitalopram (LEXAPRO) 20 MG tablet; Take 1 tablet (20 mg) by mouth daily  Dispense: 90 tablet; Refill: 3  -Discussed that I have low confidence in his ability to taper unless he engage himself in some other coping strategy including mindfulness (recommended MBSR), spiritual practice, intensive CBT or ACT therapy, etc.     4. Panic attack    5. Hyperlipidemia LDL goal <160  Ongoing, needs refills.   -FLP today.   - atorvastatin (LIPITOR) 40 MG tablet; Take 1 tablet (40 mg) by mouth daily  Dispense: 90 tablet; Refill: 3    COUNSELING:   Reviewed preventive health counseling, as reflected in patient instructions           reports that he quit smoking about 2 years ago. His smoking use included Cigarettes. He has a 20.00 pack-year smoking history. He has never used smokeless tobacco.      Estimated body mass index is 29.45 kg/(m^2) as calculated from the following:    Height as of this encounter: 5' 10.25\" (1.784 m).    Weight as of this encounter: 206 lb 11.2 oz (93.8 kg).   Weight management plan: Discussed healthy diet and exercise guidelines and patient will follow up in 12 months in clinic to re-evaluate.    Counseling Resources:  ATP IV Guidelines  Pooled Cohorts Equation Calculator  FRAX Risk Assessment  ICSI Preventive Guidelines  Dietary Guidelines for Americans, 2010  USDA's MyPlate  ASA Prophylaxis  Lung CA Screening    GUERO Ferreira [facility]  "

## 2018-01-05 NOTE — MR AVS SNAPSHOT
After Visit Summary   1/5/2018    Diego Meng    MRN: [id number]           Patient Information     Date Of Birth          1975        Visit Information        Provider Department      1/5/2018 8:20 AM Priscilla Son APRN [facility] [place]        Today's Diagnoses     Healthcare maintenance    -  1    Need for vaccination        BRIDGER (generalized anxiety disorder)        Panic attack        Hyperlipidemia LDL goal <160          Care Instructions    1. The untethered soul, mindfulness based stress reduction  2. I'll ask about weaning from psychiatry and let you know  3. I'll get back to you about labs        Preventive Health Recommendations  Male Ages 40 to 49    Yearly exam:             See your health care provider every year in order to  o   Review health changes.   o   Discuss preventive care.    o   Review your medicines if your doctor has prescribed any.    You should be tested each year for STDs (sexually transmitted diseases) if you re at risk.     Have a cholesterol test every 5 years.     Have a colonoscopy (test for colon cancer) if someone in your family has had colon cancer or polyps before age 50.     After age 45, have a diabetes test (fasting glucose). If you are at risk for diabetes, you should have this test every 3 years.      Talk with your health care provider about whether or not a prostate cancer screening test (PSA) is right for you.    Shots: Get a flu shot each year. Get a tetanus shot every 10 years.     Nutrition:    Eat at least 5 servings of fruits and vegetables daily.     Eat whole-grain bread, whole-wheat pasta and brown rice instead of white grains and rice.     Talk to your provider about Calcium and Vitamin D.     Lifestyle    Exercise for at least 150 minutes a week (30 minutes a day, 5 days a week). This will help you control your weight and prevent disease.     Limit alcohol to one drink per day.     No smoking.     Wear sunscreen to  "prevent skin cancer.     See your dentist every six months for an exam and cleaning.              Follow-ups after your visit        Your next 10 appointments already scheduled     Jan 12, 2018 10:00 AM CST   Return Visit with Reba Vallecillo MD   [facility] ([facility])    [address] [address] [facility] [address]   [phone]              Who to contact     If you have questions or need follow up information about today's clinic visit or your schedule please contact [facility] directly at [phone].  Normal or non-critical lab and imaging results will be communicated to you by [employer], letter or phone within 4 business days after the clinic has received the results. If you do not hear from us within 7 days, please contact the clinic through [employer] or phone. If you have a critical or abnormal lab result, we will notify you by phone as soon as possible.  Submit refill requests through [employer] or call your pharmacy and they will forward the refill request to us. Please allow 3 business days for your refill to be completed.          Additional Information About Your Visit        MyChart Information     [employer] gives you secure access to your electronic health record. If you see a primary care provider, you can also send messages to your care team and make appointments. If you have questions, please call your primary care clinic.  If you do not have a primary care provider, please call [phone] and they will assist you.        Care EveryWhere ID     This is your Care EveryWhere ID. This could be used by other organizations to access your [place] medical records  [id number]        Your Vitals Were     Pulse Temperature Height Pulse Oximetry BMI (Body Mass Index)       78 97.9  F (36.6  C) (Tympanic) 5' 10.25\" (1.784 m) 97% 29.45 kg/m2        Blood Pressure from Last 3 Encounters:   01/05/18 106/64   11/21/17 116/62   11/10/17 122/62    Weight " from Last 3 Encounters:   01/05/18 206 lb 11.2 oz (93.8 kg)   11/21/17 211 lb 1.6 oz (95.8 kg)   11/10/17 209 lb 8 oz (95 kg)              We Performed the Following     ADMIN 1st VACCINE     Comprehensive metabolic panel     Lipid panel reflex to direct LDL Fasting     SCREENING QUESTIONS FOR ADULT IMMUNIZATIONS     TDAP VACCINE (ADACEL)          Today's Medication Changes          These changes are accurate as of: 1/5/18  8:57 AM.  If you have any questions, ask your nurse or doctor.               Stop taking these medicines if you haven't already. Please contact your care team if you have questions.     clonazePAM 1 MG tablet   Commonly known as:  klonoPIN   Stopped by:  Priscilla Son APRN CNP                Where to get your medicines      These medications were sent to [facility] [address] [place] [address] AT [address] [address]    Hours:  24-hours Phone:  [phone]     atorvastatin 40 MG tablet    escitalopram 20 MG tablet                Primary Care Provider Office Phone # Fax #    Priscilla Murphy [phone] [address] [place] [address]        Equal Access to Services     MATHEW DUMONT AH: Hadii em ku hadasho Soomaali, waaxda luqadaha, qaybta kaalmada adeegyada, jamel wilcox hayan crouch. So [facility] [phone].    ATENCIÓN: Si habla español, tiene a yanez disposición servicios gratuitos de asistencia lingüística. [facility] [phone].    We comply with applicable federal civil rights laws and [place] laws. We do not discriminate on the basis of race, color, national origin, age, disability, sex, sexual orientation, or gender identity.            Thank you!     Thank you for choosing [facility]  for your care. Our goal is always to provide you with excellent care. Hearing back from our patients is one way we can continue to improve our services.  Please take a few minutes to complete the written survey that you may receive in the mail after your visit with us. Thank you!             Your Updated Medication List - Protect others around you: Learn how to safely use, store and throw away your medicines at www.disposemymeds.org.          This list is accurate as of: 1/5/18  8:57 AM.  Always use your most recent med list.                   Brand Name Dispense Instructions for use Diagnosis    atorvastatin 40 MG tablet    LIPITOR    90 tablet    Take 1 tablet (40 mg) by mouth daily    Hyperlipidemia LDL goal <160       buprenorphine HCl-naloxone HCl 8-2 MG per film    SUBOXONE    60 Film    Place 1 Film under the tongue every 12 hours    Chronic pain syndrome, Continuous opioid dependence (H)       escitalopram 20 MG tablet    LEXAPRO    90 tablet    Take 1 tablet (20 mg) by mouth daily    BRIDGER (generalized anxiety disorder)       esomeprazole 20 MG CR capsule    nexIUM    30 capsule    Take 1 capsule (20 mg) by mouth every morning (before breakfast) Take 30-60 minutes before eating.        omega 3 1000 MG Caps     90 capsule    Take 1 g by mouth daily

## 2018-01-05 NOTE — Clinical Note
"Hi there,  This patient is seeing me while his PCP is out on maternity leave.  What is your impression of Diego? It is my impression that he is very med focused and not interested in other therapies. It is also my opinion that he should continue to wean the suboxone. I didn't see in your notes if that was the plan, or if he is just a \"lifer.\"  Also, you mentioned his psychiatrist prescribes the clonazepam. In fact, his PCP has been prescribing it. I'm assuming the risks of benzos plus suboxone are lower than with opioids alone? I may require him to see psychiatry as I'm not comfortable with daily benzos.  Just wanted to get your thoughts.  Thanks!  Priscilla"

## 2018-01-08 DIAGNOSIS — R74.8 ELEVATED LIVER ENZYMES: Primary | ICD-10-CM

## 2018-01-10 ENCOUNTER — TELEPHONE (OUTPATIENT)
Dept: PALLIATIVE MEDICINE | Facility: CLINIC | Age: 43
End: 2018-01-10

## 2018-01-10 NOTE — TELEPHONE ENCOUNTER
"Patient states he is having an \"explosion\" of pain in his lower back.   Describes his pain level at an 8/10.   Knows he has an appt this Friday with Reba but wants to give a heads up as it is getting too bothersome.       Patricia CHA    [facility]    "

## 2018-01-10 NOTE — TELEPHONE ENCOUNTER
Pt saw provider in primary care on 1/5/18 and no reports of new pain issues.     TARI MoodyN, RN-BC  Patient Care Supervisor/Care Coordinator  [facility]

## 2018-01-11 NOTE — TELEPHONE ENCOUNTER
Patient has an appointment tomorrow and can discuss at his office visit. Routing to provider as FYI. OK to close when reviewed.     Trena MARCN-RN Care Coordinator  [facility]

## 2018-01-12 ENCOUNTER — HOSPITAL ENCOUNTER (OUTPATIENT)
Dept: LAB | Facility: CLINIC | Age: 43
Discharge: HOME | End: 2018-01-12
Attending: ANESTHESIOLOGY | Admitting: ANESTHESIOLOGY
Payer: COMMERCIAL

## 2018-01-12 ENCOUNTER — OFFICE VISIT (OUTPATIENT)
Dept: PALLIATIVE MEDICINE | Facility: CLINIC | Age: 43
End: 2018-01-12
Payer: COMMERCIAL

## 2018-01-12 VITALS
DIASTOLIC BLOOD PRESSURE: 75 MMHG | SYSTOLIC BLOOD PRESSURE: 113 MMHG | OXYGEN SATURATION: 97 % | BODY MASS INDEX: 30.06 KG/M2 | WEIGHT: 211 LBS | HEART RATE: 71 BPM

## 2018-01-12 DIAGNOSIS — F11.20 CONTINUOUS OPIOID DEPENDENCE (H): Primary | ICD-10-CM

## 2018-01-12 DIAGNOSIS — M25.50 MULTIPLE JOINT PAIN: ICD-10-CM

## 2018-01-12 DIAGNOSIS — M25.50 PAIN IN JOINT, MULTIPLE SITES: Primary | ICD-10-CM

## 2018-01-12 DIAGNOSIS — M79.18 MYOFASCIAL PAIN: ICD-10-CM

## 2018-01-12 DIAGNOSIS — G89.4 CHRONIC PAIN SYNDROME: ICD-10-CM

## 2018-01-12 DIAGNOSIS — F41.1 GAD (GENERALIZED ANXIETY DISORDER): ICD-10-CM

## 2018-01-12 LAB
CRP SERPL-MCNC: <2.9 MG/L (ref 0–8)
ERYTHROCYTE [SEDIMENTATION RATE] IN BLOOD BY WESTERGREN METHOD: 6 MM/H (ref 0–15)

## 2018-01-12 PROCEDURE — 99214 OFFICE O/P EST MOD 30 MIN: CPT | Performed by: ANESTHESIOLOGY

## 2018-01-12 PROCEDURE — 36415 COLL VENOUS BLD VENIPUNCTURE: CPT | Performed by: ANESTHESIOLOGY

## 2018-01-12 PROCEDURE — 86431 RHEUMATOID FACTOR QUANT: CPT | Performed by: ANESTHESIOLOGY

## 2018-01-12 PROCEDURE — 86140 C-REACTIVE PROTEIN: CPT | Performed by: ANESTHESIOLOGY

## 2018-01-12 PROCEDURE — 85652 RBC SED RATE AUTOMATED: CPT | Performed by: ANESTHESIOLOGY

## 2018-01-12 RX ORDER — BUPRENORPHINE AND NALOXONE 8; 2 MG/1; MG/1
1 FILM, SOLUBLE BUCCAL; SUBLINGUAL EVERY 12 HOURS
Qty: 60 FILM | Refills: 2 | Status: SHIPPED | OUTPATIENT
Start: 2018-01-12 | End: 2018-04-06

## 2018-01-12 ASSESSMENT — PAIN SCALES - GENERAL: PAINLEVEL: EXTREME PAIN (8)

## 2018-01-12 NOTE — MR AVS SNAPSHOT
After Visit Summary   1/12/2018    Diego Meng    MRN: [id number]           Patient Information     Date Of Birth          1975        Visit Information        Provider Department      1/12/2018 10:00 AM Reba Vallecillo MD [facility]        Today's Diagnoses     Multiple joint pain    -  1    Chronic pain syndrome        Continuous opioid dependence (H)          Care Instructions    1. Follow up in 3 months  2. Go get your labs drawn at the hospital   3. Split your films in half and take 4 times a day instead of twice daily - this will help with your pain.     Reba Vallecillo MD     ----------------------------------------------------------------  Nurse Triage line:  [phone]   Call this number with any questions or concerns. You may leave a detailed message anytime. Calls are typically returned Monday through Friday between 8 AM and 4:30 PM. We usually get back to you within 2 business days depending on the issue/request.       Medication refills:    For non-narcotic medications, call your pharmacy directly to request a refill. The pharmacy will contact the [facility] Management Center for authorization. Please allow 3-4 days for these refills to be processed.     For narcotic refills, call the nurse triage line or send a [employer] message. Please contact us 7-10 days before your refill is due. The message MUST include the name of the specific medication(s) requested and how you would like to receive the prescription(s). The options are as follows:    Pain Clinic staff can mail the prescription to your pharmacy. Please tell us the name of the pharmacy.    You may pick the prescription up at the Pain Clinic (tell us the location) or during a clinic visit with your pain provider    Pain Clinic staff can deliver the prescription to the [place] pharmacy in the clinic building. Please tell us the location.      Scheduling number: [phone].  Call this number to schedule or  change appointments.    We believe regular attendance is key to your success in our program.    Any time you are unable to keep your appointment we ask that you call us at least 24 hours in advance to let us know. This will allow us to offer the appointment time to another patient.                 Follow-ups after your visit        Who to contact     If you have questions or need follow up information about today's clinic visit or your schedule please contact [facility] directly at [phone].  Normal or non-critical lab and imaging results will be communicated to you by "[employer]hart, letter or phone within 4 business days after the clinic has received the results. If you do not hear from us within 7 days, please contact the clinic through [employer] or phone. If you have a critical or abnormal lab result, we will notify you by phone as soon as possible.  Submit refill requests through [employer] or call your pharmacy and they will forward the refill request to us. Please allow 3 business days for your refill to be completed.          Additional Information About Your Visit        Brand[employer]er Information     [employer] gives you secure access to your electronic health record. If you see a primary care provider, you can also send messages to your care team and make appointments. If you have questions, please call your primary care clinic.  If you do not have a primary care provider, please call [phone] and they will assist you.        Care EveryWhere ID     This is your Care EveryWhere ID. This could be used by other organizations to access your [place] medical records  [id number]        Your Vitals Were     Pulse Pulse Oximetry BMI (Body Mass Index)             71 97% 30.06 kg/m2          Blood Pressure from Last 3 Encounters:   01/12/18 113/75   01/05/18 106/64   11/21/17 116/62    Weight from Last 3 Encounters:   01/12/18 95.7 kg (211 lb)   01/05/18 93.8 kg (206 lb 11.2 oz)   11/21/17 95.8 kg (211 lb 1.6  oz)              We Performed the Following     CRP inflammation     Erythrocyte sedimentation rate auto     Rheumatoid factor          Where to get your medicines      Some of these will need a paper prescription and others can be bought over the counter.  Ask your nurse if you have questions.     Bring a paper prescription for each of these medications     buprenorphine HCl-naloxone HCl 8-2 MG per film          Primary Care Provider Office Phone # Fax #    Priscilla Murphy [phone] [address] [place] [address]        Equal Access to Services     MATHEW DUMONT : Hadii aad ku hadasho Soomaali, waaxda luqadaha, qaybta kaalmada adeegyada, waxay idiin hayaan adeeg kharash laviviana . So [facility] [phone].    ATENCIÓN: Si habla español, tiene a yanez disposición servicios gratuitos de asistencia lingüística. [facility] [phone].    We comply with applicable federal civil rights laws and [place] laws. We do not discriminate on the basis of race, color, national origin, age, disability, sex, sexual orientation, or gender identity.            Thank you!     Thank you for choosing [facility]  for your care. Our goal is always to provide you with excellent care. Hearing back from our patients is one way we can continue to improve our services. Please take a few minutes to complete the written survey that you may receive in the mail after your visit with us. Thank you!             Your Updated Medication List - Protect others around you: Learn how to safely use, store and throw away your medicines at www.disposemymeds.org.          This list is accurate as of: 1/12/18 10:37 AM.  Always use your most recent med list.                   Brand Name Dispense Instructions for use Diagnosis    atorvastatin 40 MG tablet    LIPITOR    90 tablet    Take 1 tablet (40 mg) by mouth daily    Hyperlipidemia LDL goal <160       buprenorphine HCl-naloxone HCl 8-2 MG per film     SUBOXONE    60 Film    Place 1 Film under the tongue every 12 hours    Chronic pain syndrome, Continuous opioid dependence (H)       escitalopram 20 MG tablet    LEXAPRO    90 tablet    Take 1 tablet (20 mg) by mouth daily    BRIDGER (generalized anxiety disorder)       esomeprazole 20 MG CR capsule    nexIUM    30 capsule    Take 1 capsule (20 mg) by mouth every morning (before breakfast) Take 30-60 minutes before eating.        omega 3 1000 MG Caps     90 capsule    Take 1 g by mouth daily

## 2018-01-12 NOTE — PATIENT INSTRUCTIONS
1. Follow up in 3 months  2. Go get your labs drawn at the hospital   3. Split your films in half and take 4 times a day instead of twice daily - this will help with your pain.     Reba Vallecillo MD     ----------------------------------------------------------------  Nurse Triage line:  [phone]   Call this number with any questions or concerns. You may leave a detailed message anytime. Calls are typically returned Monday through Friday between 8 AM and 4:30 PM. We usually get back to you within 2 business days depending on the issue/request.       Medication refills:    For non-narcotic medications, call your pharmacy directly to request a refill. The pharmacy will contact the [facility] for authorization. Please allow 3-4 days for these refills to be processed.     For narcotic refills, call the nurse triage line or send a [employer] message. Please contact us 7-10 days before your refill is due. The message MUST include the name of the specific medication(s) requested and how you would like to receive the prescription(s). The options are as follows:    Pain Clinic staff can mail the prescription to your pharmacy. Please tell us the name of the pharmacy.    You may pick the prescription up at the Pain Clinic (tell us the location) or during a clinic visit with your pain provider    Pain Clinic staff can deliver the prescription to the [place] pharmacy in the clinic building. Please tell us the location.      Scheduling number: [phone].  Call this number to schedule or change appointments.    We believe regular attendance is key to your success in our program.    Any time you are unable to keep your appointment we ask that you call us at least 24 hours in advance to let us know. This will allow us to offer the appointment time to another patient.

## 2018-01-12 NOTE — PROGRESS NOTES
"                          [facility]    Date of visit: 1/12/2017    Chief complaint:   No chief complaint on file.    Interval history:  Deigo Meng is a 42 year old male last seen by me for follow up on 10/12/17.      Brief history: 42 YEAR OLD MALE who began having low back pain at age 20.  Pain continued and progressed to neck and right shoulder pain for the last 20 years.  History of high dose opioids after having a L4-5 decompression at [facility] by Dr. Rios. Dose escalated and got \"out of control\".  He admits to overuse.  He was transitioned to buprenorphine by Dr. Almonte and was on very high dose 32mg when he was transferred to myself for ongoing care.  He has been successful in decreasing to 16mg daily with better pain control.   PMHx is significant for anxiety.     Since his last visit, Diego Tatepietromike reports:  - PCP Dr. Murphy has been prescribing his Klonopin 1mg 4 times/day for anxiety for years.  She is on maternity leave and he saw Ria Son last week.  She is suggesting he wean off slowly over the next couple years and he is in agreement.  He has been on them for 10 years.  He is having side effects from the clonazepam - memory issues and trouble getting up in the morning.   - Flare of low back pain for the last week.  Bilateral low back radiating to his buttocks.  He had to call in sick to work yesterday the pain was so intense.  He has had flares like this before and they have always resolved with time.  He is using robaxin which is helpful and suboxone.  He also does some stretching.   - He is worried he has ankylosing spondylitits.  He heard about it on the radio and his grandfather was \"crippled with rheumatism\".  He would like to at least have some labs drawn.    -His chronic back pain is located in his bilateral low back and radiates to the left posterolateral hip.  The pain is aggravated by sitting and standing. His low back pain also is " radiating down the right posterior thigh and leg to the heel. The pain is much worse in his back than in his leg. The right leg gives way at times. Denies any numbness but is having some tingling.  - Left foot pain - plantar fascitis. No change  -Currently debating having a fusion with Dr. Rios and would like to get a second opinion with Dr. Ayala.  He does not want to do this unless he is forced to.   -He did not follow up with sports medicine - Dr. Kaufman.  He has a torn medial meniscus in his left knee and will likely need surgery for that as well. He had a knee injection a month ago which was not helpful.   -He has had many different injections over the years none of which were helpful for more than a few days.  Recent Trigger point injections (lumbar spine) not helpful   - Full time  (designes health care buildings)   -He has not scheduled with Dr. Benitez yet. He reports that his anxiety has settled down since getting a new job 3 months ago and he does not need pain psychology  -His headaches are unchanged. They start at the back of his head/neck and radiate through his head to his eyes.  He attributes his headaches to rebound and has a very difficult time discontinuing excedrin.     Pain scores:  Pain intensity on average is 8 (5 last visit) on a scale of 0-10.     Current pain treatments:   Suboxone 8-2 mg #2/day  Excedrin #2-6/day for 20 years  lexapro 20 mg daily  Clonazepam 1 mg #1-2/day  Robaxin 500-1000mg PRN QID    Side Effects: no side effect    Past pain treatments:  Diego Meng has been seen at a pain clinic in the past. [facility] Dr. Vázquez and [facility]  Medications:     Medrol dose carolin -no relief              botox -no relief              Anti-migraine: fioricet, migranal, eletriptan, frovatriptan, midrin, naraptriptan, sumatriptan, zolmitripatn -no relief from any of these              Anti-convulsants: depakote, topiramate, gabapentin, lyrica -all of  these made him feel fuzzy and forgetful and they were not helpful              Opioids: oxycontin, morphine -no relief, hydrocodone -no relief, tramadol -no relief                Muscle relaxants: soma -no relief, flexeril -no relief and made him tired, norflex -no relief, tizanidine -no relief and made him tired              NSAIDs: allergic to this class of medications -rash, abdominal pain and cramping, toradol -upset stomach              Anti-depressants: cymbalta -no relief                Topicals: lidocaine -no relief  PT: [facility]las in 2016 -helped for shoulder but flared up back and neck  Psychology: yes for pain at the [facility] -'continues to use these skills'  Acupuncture: tried it -no relief  Chiropractic care: tried it -helped initially but then flared up pain  TENS Unit: no relief  Injections: R L4 and L5 TF BAY 12/24/16 -flared pain, cervical epidural steroid injection -provided 2-4 weeks of relief but had significant flare up for 2 weeks post-procedure, cervical trigger point injections -minimal relief, lumbar medial branch block -no relief, lumbar paraspinal trigger point injection 5/17/17 - not helpful  Surgery: right L4-L5 hemilaminectomy May 2015 Dr. Rios    Medications:  Current Outpatient Prescriptions   Medication Sig Dispense Refill     escitalopram (LEXAPRO) 20 MG tablet Take 1 tablet (20 mg) by mouth daily 90 tablet 3     atorvastatin (LIPITOR) 40 MG tablet Take 1 tablet (40 mg) by mouth daily 90 tablet 3     buprenorphine HCl-naloxone HCl (SUBOXONE) 8-2 MG per film Place 1 Film under the tongue every 12 hours 60 Film 2     esomeprazole (NEXIUM) 20 MG capsule Take 1 capsule (20 mg) by mouth every morning (before breakfast) Take 30-60 minutes before eating. 30 capsule 0     omega 3 1000 MG CAPS Take 1 g by mouth daily 90 capsule        Medical History: any changes in medical history since they were last seen? no    Review of Systems:  The [employer] system  ROS was reviewed from the intake questionnaire, and is positive for: headache, joint pain, anxiety, stress, back pain and neck pain  Any bowel or bladder problems: none  Mood: anxiety, stress    Physical Exam:  Blood pressure 113/75, pulse 71, weight 95.7 kg (211 lb), SpO2 97 %.  General: no acute distress, pleasant, sitting comfortably but leaning to the left  Gait: antalgic  MSK: full strength but with some guarding due to pain    Imaging:  Lumbar MRI completed on 4/24/17:  FINDINGS: Five lumbar vertebrae are assumed. Degenerative loss of disc  signal with relatively normal disc height is noted at L4-L5 and L5-S1.  Minimal discogenic endplate reactive marrow edema is noted posteriorly  at L4-L5 as well. Apophyseal joints are essentially within normal  limits. Vertebral body heights and sagittal alignment are within  normal limits. The conus medullaris is unremarkable in appearance on  the sagittal images.      T12-L1, L1-L2, L2-L3, L3-L4: Normal.     L4-L5: Mild annular bulging and endplate spurring with mild foraminal  narrowing below the exiting nerve roots. Posterior high signal annular  fissuring with minimal central disc protrusion. No central stenosis.     L5-S1: Small 0.3 cm caudally dissecting disc extrusion to the left of  midline, similar in appearance to 3/10/2015. This does not indent the  thecal sac or compress/displace the left S1 nerve root. No abnormal  enhancement. No central or foraminal stenosis.       IMPRESSION:  1. L5-S1 small caudally dissecting 0.3 cm disc extrusion slightly to  the left of midline without apparent neural impingement or stenosis.  2. L4-L5 small central disc protrusion, without apparent neural  impingement or central stenosis, and mild bilateral foraminal  stenosis.    Lumbar MRI completed on 8/1/15 showed:  CONCLUSION:    1. Interval right hemilaminotomy and partial facetectomy at L4-L5. No recurrent disc herniation.  2. Other minor degenerative changes. Annular fissures  L4-L5 and L5-S1. No high-grade central canal or foraminal stenosis at any level.    Cervical spine MRI completed on 11/14/14:  FINDINGS: Cervical spine visualized through the upper thoracic spine. Lordosis normal. Slight retrolisthesis at each level between C3 and C6. Vertebral body heights are normal. Marrow signal is normal, including the facets. Paraspinous soft tissues are unremarkable. Visible posterior fossa contents are unremarkable. Cord signal and morphology is normal.  CRANIOCERVICAL JUNCTION: Mild anterior spurring at C1-C2. Foramen magnum and central canal are adequate.  C2-C3: Disc height normal. Mild facet hypertrophy. No central canal or foraminal stenosis.  C3-C4: Disc height normal. Slight right uncinate hypertrophy. Minimal facet hypertrophy. No significant central canal or foraminal stenosis.    C4-C5: Slight disc height loss. Mild bilateral facet hypertrophy. No central canal or foraminal stenosis.  C5-C6: Mild disc height loss with left eccentric disc osteophyte complex. Mild bilateral facet hypertrophy. No central canal or right foraminal stenosis. Moderate left foraminal stenosis.    C6-C7: Slight disc height loss. Mild facet hypertrophy. No central canal or foraminal stenosis.  C7-T1: Mild disc height loss. Mild to moderate bilateral facet hypertrophy. No central canal or foraminal stenosis.  CONCLUSION:  1. Moderate left foraminal stenosis at C5-C6.  2. Other relatively minor degenerative changes, no further high-grade central canal or foraminal stenosis.     Right shoulder x-ray completed on 4/2/15:  FINDINGS: No evidence of fracture or dislocation. Normal glenohumeral joint alignment without evidence of degenerative changes. No evidence of significant acromioclavicular joint degenerative joint disease. No acromial or coracoid enthesophyte. Mild undersurface acromial sclerosis. Downslope type three acromion. No blastic or lytic lesions.  IMPRESSION:  Right shoulder type three acromion  "otherwise within normal limits.     [place] Board of Pharmacy Data Base Reviewed: YES; as expected, no concern for abuse or misuse    Assessment:   1. Chronic pain syndrome - widespread pain  2. Opioid dependency uncomplicated  3. Acute on Chronic low back pain. One week of bilateral low back and buttock pain likely myofascial in etiology.  Improved with robaxin and suboxone.  S/P Right L4-L5 hemilaminectomy partial facetectomy in May 2015 with Dr. Rios.   4. Chronic neck pain. Cervical facet arthropathy.  5. Chronic headaches. Likely related to facet arthropathy, psychogenic, and rebound (excedrin)  6. Anxiety with panic attacks. Takes lexapro and clonazepam. Stable.  7. Benzodiazepine dependence - has been on Clonazepam for about 10 years without any abuse or misuse.  Plan is to taper off this over the next couple years secondary to side effects of sedation and memory loss.  His PCP is managing this taper.   8. Tobacco use disorder  9. Chronic bilateral knee pain. Left knee meniscus repair on 11/28/16 with Dr. Kaufman.    10. Chronic right shoulder pain.  11. No self cares. Reliant on medication. Chemically coping.     Plan:  1. Pain Psychology - Recommended he schedule with Jaye, however, he is reluctant to do this and states he has done this at [facility] and they discharged him as \"stable\".   2. PT - YES - highly recommended and discussed in detail at today's visit, however he does not feel he will benefit from this.    3. Medication Management:    1. UDS -  8/17/2017 normal / Opioid agreement - 6/30/2017 - will need a repeat UDS at next follow up   2. Suboxone 8mg films - BID - 2 refills given  He was able to decrease his dose from 32mg daily to 16mg daily.  Recommend he split this in half and take 4 times/day for better pain control.  The pain relieving properties of Suboxone last about 4-6 hours.      4. Labs - CRP, ESR, RF - get these drawn today  5. Follow up with Dr. Vallecillo - 3 months - April " 6th, 2018    Total time spent was 30 minutes, and more than 50% of face to face time was spent in counseling and/or coordination of care regarding the above assessment and plan.    Reba Vallecillo MD [facility]

## 2018-01-12 NOTE — NURSING NOTE
"Chief Complaint   Patient presents with     Pain     lumbar       Initial /75  Pulse 71  Wt 95.7 kg (211 lb)  SpO2 97%  BMI 30.06 kg/m2 Estimated body mass index is 30.06 kg/(m^2) as calculated from the following:    Height as of 1/5/18: 1.784 m (5' 10.25\").    Weight as of this encounter: 95.7 kg (211 lb).    "

## 2018-01-15 LAB — RHEUMATOID FACT SER NEPH-ACNC: <20 IU/ML (ref 0–20)

## 2018-01-30 ENCOUNTER — MYC MEDICAL ADVICE (OUTPATIENT)
Dept: PEDIATRICS | Facility: CLINIC | Age: 43
End: 2018-01-30

## 2018-01-30 DIAGNOSIS — F41.1 GAD (GENERALIZED ANXIETY DISORDER): ICD-10-CM

## 2018-01-30 DIAGNOSIS — F41.0 PANIC ATTACK: ICD-10-CM

## 2018-01-30 DIAGNOSIS — G89.4 CHRONIC PAIN SYNDROME: ICD-10-CM

## 2018-01-30 RX ORDER — CLONAZEPAM 1 MG/1
1 TABLET ORAL 4 TIMES DAILY
Qty: 120 TABLET | Refills: 0 | Status: CANCELLED | OUTPATIENT
Start: 2018-01-30

## 2018-01-30 RX ORDER — DIAZEPAM 10 MG
20 TABLET ORAL 3 TIMES DAILY
Qty: 20 TABLET | Refills: 0 | Status: CANCELLED | OUTPATIENT
Start: 2018-01-30

## 2018-01-30 NOTE — TELEPHONE ENCOUNTER
Klonopin 1 mg      Last Written Prescription Date:  01/05/18  Last Fill Quantity: 120,   # refills: 0  Last Office Visit: 01/05/18  Future Office visit:    Next 5 appointments (look out 90 days)     Apr 06, 2018 10:30 AM CDT   Return Visit with Reba Vallecillo MD   [facility] ([facility])    [address] [address] [address]   [phone]                 Per My chart message of 01/05/18-If you have already filled the clonazepam lets just stick with that for this month, then switch to the valium at the end of the month.     In the mean time, I'd consider taking only a 1/2 tab or 3/4 of a tab (may be difficult to cut, but definitely do-able) for one of your doses. You might figure out which time of day you are the most zombie like and reduce the previous dose by a little bit.   Routing refill request to provider for review/approval because:  Drug not on the [facility], [facility] or [facility] refill protocol or controlled substance  Klonopin is discontinued from medication list.  CARLOS Priest RN

## 2018-02-01 RX ORDER — CLONAZEPAM 1 MG/1
TABLET ORAL
Qty: 105 TABLET | Refills: 0 | Status: SHIPPED | OUTPATIENT
Start: 2018-02-01 | End: 2018-04-04

## 2018-02-05 DIAGNOSIS — F41.1 GAD (GENERALIZED ANXIETY DISORDER): ICD-10-CM

## 2018-03-23 ENCOUNTER — TRANSFERRED RECORDS (OUTPATIENT)
Dept: HEALTH INFORMATION MANAGEMENT | Facility: CLINIC | Age: 43
End: 2018-03-23

## 2018-03-26 NOTE — TELEPHONE ENCOUNTER
Message was received that patient, and/or caregiver Kateryna Was inquiring about plan for follow up scans and IR follow up post left renal cryoablation done 2/14/18.     Attempted to contact patient at listed number in chart, but was unable to leave a voicemail as no voicemail box is set up.     Patient will need CT and IR follow up around 5/14/18 (3 months post cryoablation) and IR office will contact patient when it is closer to that date to set up appointments.        Provider recently talked with pt re: MRI results and pt was recently seen on 4/17.      Will route to Dr. Almonte for initial impression of pt's request.     TARI MoodyN, RN-BC  Patient Care Supervisor/Care Coordinator  [facility]

## 2018-03-27 ENCOUNTER — HOSPITAL ENCOUNTER (EMERGENCY)
Facility: CLINIC | Age: 43
Discharge: HOME | End: 2018-03-27
Attending: EMERGENCY MEDICINE | Admitting: EMERGENCY MEDICINE
Payer: COMMERCIAL

## 2018-03-27 ENCOUNTER — TELEPHONE (OUTPATIENT)
Dept: PEDIATRICS | Facility: CLINIC | Age: 43
End: 2018-03-27

## 2018-03-27 VITALS
TEMPERATURE: 98.1 F | SYSTOLIC BLOOD PRESSURE: 119 MMHG | DIASTOLIC BLOOD PRESSURE: 74 MMHG | RESPIRATION RATE: 16 BRPM | HEART RATE: 77 BPM | OXYGEN SATURATION: 98 %

## 2018-03-27 DIAGNOSIS — M54.6 CHRONIC RIGHT-SIDED THORACIC BACK PAIN: ICD-10-CM

## 2018-03-27 DIAGNOSIS — G89.29 CHRONIC RIGHT-SIDED THORACIC BACK PAIN: ICD-10-CM

## 2018-03-27 DIAGNOSIS — M54.6 RIGHT-SIDED THORACIC BACK PAIN, UNSPECIFIED CHRONICITY: Primary | ICD-10-CM

## 2018-03-27 PROCEDURE — 99282 EMERGENCY DEPT VISIT SF MDM: CPT

## 2018-03-27 RX ORDER — OXYCODONE HYDROCHLORIDE 5 MG/1
5 TABLET ORAL EVERY 6 HOURS PRN
Qty: 8 TABLET | Refills: 0 | Status: SHIPPED | OUTPATIENT
Start: 2018-03-27 | End: 2018-07-05

## 2018-03-27 RX ORDER — METHYLPREDNISOLONE 4 MG
TABLET, DOSE PACK ORAL
Qty: 21 TABLET | Refills: 0 | Status: SHIPPED | OUTPATIENT
Start: 2018-03-27 | End: 2018-07-05

## 2018-03-27 ASSESSMENT — ENCOUNTER SYMPTOMS
FREQUENCY: 0
COUGH: 1
CHILLS: 0
NUMBNESS: 0
WEAKNESS: 0
NAUSEA: 0
BACK PAIN: 1
DIFFICULTY URINATING: 0
SHORTNESS OF BREATH: 0
FLANK PAIN: 0
DIARRHEA: 1
FEVER: 0
VOMITING: 0

## 2018-03-27 NOTE — ED AVS SNAPSHOT
[facility] Emergency Department    [address]    [facility] [address]    Phone:  [phone]    Fax:  [phone]                                       Diego Meng   MRN: [id number]    Department:  [facility] Emergency Department   Date of Visit:  3/27/2018           Patient Information     Date Of Birth          1975        Your diagnoses for this visit were:     Chronic right-sided thoracic back pain        You were seen by Tess Morrissey MD.      Follow-up Information     Follow up with Reba Vallecillo MD. Schedule an appointment as soon as possible for a visit in 1 week.    Specialties:  Anesthesiology, ANESTHESIOLOGY-PAIN MEDICINE    Why:  for recheck     Contact information:    [facility]  [address] [place], [place] [address]E [facility] [id number] [phone]          Discharge Instructions       Discharge Instructions  Back Pain  You were seen today for back pain. Back pain can have many causes, but most will get better without surgery or other specific treatment. Sometimes there is a herniated ( slipped ) disc. We do not usually do MRI scans to look for these right away, since most herniated discs will get better on their own with time.  Today, we did not find any evidence that your back pain was caused by a serious condition. However, sometimes symptoms develop over time and cannot be found during an emergency visit, so it is very important that you follow up with your primary provider.  Generally, every Emergency Department visit should have a follow-up clinic visit with either a primary or a specialty clinic/provider. Please follow-up as instructed by your emergency provider today.    Return to the Emergency Department if:    You develop a fever with your back pain.     You have weakness or change in sensation in one or both legs.    You lose control of your bowels or bladder, or cannot empty your bladder (cannot pee).    Your pain gets much worse.      Follow-up with your provider:    Unless your pain has completely gone away, please make an appointment with your provider within one week. Most of the routine care for back pain is available in a clinic and not the Emergency Department. You may need further management of your back pain, such as more pain medication, imaging such as an X-ray or MRI, or physical therapy.    What can I do to help myself?    Remain Active -- People are often afraid that they will hurt their back further or delay recovery by remaining active, but this is one of the best things you can do for your back. In fact, staying in bed for a long time to rest is not recommended. Studies have shown that people with low back pain recover faster when they remain active. Movement helps to bring blood flow to the muscles and relieve muscle spasms as well as preventing loss of muscle strength.    Heat -- Using a heating pad can help with low back pain during the first few weeks. Do not sleep with a heating pad, as you can be burned.     Pain medications - You may take a pain medication such as Tylenol  (acetaminophen), Advil , Motrin  (ibuprofen) or Aleve  (naproxen).  If you were given a prescription for medicine here today, be sure to read all of the information (including the package insert) that comes with your prescription.  This will include important information about the medicine, its side effects, and any warnings that you need to know about.  The pharmacist who fills the prescription can provide more information and answer questions you may have about the medicine.  If you have questions or concerns that the pharmacist cannot address, please call or return to the Emergency Department.   Remember that you can always come back to the Emergency Department if you are not able to see your regular provider in the amount of time listed above, if you get any new symptoms, or if there is anything that worries you.      Your next 10 appointments  already scheduled     Apr 06, 2018 10:30 AM CDT   Return Visit with Reba Vallecillo MD   [facility] ([facility])    [address]  [address]  [facility] [address]   [phone]              24 Hour Appointment Hotline       To make an appointment at any [facility], call [phone] ([phone]). If you don't have a family doctor or clinic, we will help you find one. [facility] are conveniently located to serve the needs of you and your family.             Review of your medicines      START taking        Dose / Directions Last dose taken    methylPREDNISolone 4 MG tablet   Commonly known as:  MEDROL DOSEPAK   Quantity:  21 tablet        Follow package instructions   Refills:  0          Our records show that you are taking the medicines listed below. If these are incorrect, please call your family doctor or clinic.        Dose / Directions Last dose taken    atorvastatin 40 MG tablet   Commonly known as:  LIPITOR   Dose:  40 mg   Quantity:  90 tablet        Take 1 tablet (40 mg) by mouth daily   Refills:  3        buprenorphine HCl-naloxone HCl 8-2 MG per film   Commonly known as:  SUBOXONE   Dose:  1 Film   Quantity:  60 Film        Place 1 Film under the tongue every 12 hours   Refills:  2        clonazePAM 1 MG tablet   Commonly known as:  klonoPIN   Quantity:  105 tablet        Take a maximum of 3.5 tablets per day for the next month (tablets must be spread out throughout the day).   Refills:  0        escitalopram 20 MG tablet   Commonly known as:  LEXAPRO   Dose:  20 mg   Quantity:  90 tablet        Take 1 tablet (20 mg) by mouth daily   Refills:  3        esomeprazole 20 MG CR capsule   Commonly known as:  nexIUM   Dose:  20 mg   Quantity:  30 capsule        Take 1 capsule (20 mg) by mouth every morning (before breakfast) Take 30-60 minutes before eating.   Refills:  0        omega 3 1000 MG Caps   Dose:  1 g   Quantity:  90 capsule         Take 1 g by mouth daily   Refills:  0        ROBAXIN PO        Refills:  0                Prescriptions were sent or printed at these locations (1 Prescription)                   Other Prescriptions                Printed at Department/Unit printer (1 of 1)         methylPREDNISolone (MEDROL DOSEPAK) 4 MG tablet                Orders Needing Specimen Collection     None      Pending Results     No orders found from 3/25/2018 to 3/28/2018.            Pending Culture Results     No orders found from 3/25/2018 to 3/28/2018.            Pending Results Instructions     If you had any lab results that were not finalized at the time of your Discharge, you can call the ED Lab Result RN at [phone]. You will be contacted by this team for any positive Lab results or changes in treatment. The nurses are available 7 days a week from 10A to 6:30P.  You can leave a message 24 hours per day and they will return your call.        Test Results From Your Hospital Stay               Clinical Quality Measure: Blood Pressure Screening     Your blood pressure was checked while you were in the emergency department today. The last reading we obtained was  BP: 119/74 . Please read the guidelines below about what these numbers mean and what you should do about them.  If your systolic blood pressure (the top number) is less than 120 and your diastolic blood pressure (the bottom number) is less than 80, then your blood pressure is normal. There is nothing more that you need to do about it.  If your systolic blood pressure (the top number) is 120-139 or your diastolic blood pressure (the bottom number) is 80-89, your blood pressure may be higher than it should be. You should have your blood pressure rechecked within a year by a primary care provider.  If your systolic blood pressure (the top number) is 140 or greater or your diastolic blood pressure (the bottom number) is 90 or greater, you may have high blood pressure. High blood pressure  is treatable, but if left untreated over time it can put you at risk for heart attack, stroke, or kidney failure. You should have your blood pressure rechecked by a primary care provider within the next 4 weeks.  If your provider in the emergency department today gave you specific instructions to follow-up with your doctor or provider even sooner than that, you should follow that instruction and not wait for up to 4 weeks for your follow-up visit.        Thank you for choosing [facility]       Thank you for choosing [facility] for your care. Our goal is always to provide you with excellent care. Hearing back from our patients is one way we can continue to improve our services. Please take a few minutes to complete the written survey that you may receive in the mail after you visit with us. Thank you!        Granularhar[a]list games Information     [facility] gives you secure access to your electronic health record. If you see a primary care provider, you can also send messages to your care team and make appointments. If you have questions, please call your primary care clinic.  If you do not have a primary care provider, please call [phone] and they will assist you.        Care EveryWhere ID     This is your Care EveryWhere ID. This could be used by other organizations to access your [facility] medical records  [id number]        Equal Access to Services     PA DUMONT : Olga Eaton, rere roa, willam madrigal, jamel crouch. So [facility] [phone].    ATENCIÓN: Si habla español, tiene a yanez disposición servicios gratuitos de asistencia lingüística. Huey al [phone].    We comply with applicable federal civil rights laws and [place] laws. We do not discriminate on the basis of race, color, national origin, age, disability, sex, sexual orientation, or gender identity.            After Visit Summary       This is your record. Keep this with you and show to your community  pharmacist(s) and doctor(s) at your next visit.

## 2018-03-27 NOTE — TELEPHONE ENCOUNTER
Spouse called, no CTC on file.    Patient requesting same day appointment with A Adelaida today d/t being 'turned away' from ED in regarding to his back pain.     Ciara IRELAND RN, BSN, PHN  [place] [facility] RN

## 2018-03-27 NOTE — TELEPHONE ENCOUNTER
Patient informed. He stated he already had an MRI on his thoracic spine on Friday at [facility]. Patient has a disc.       Here is a little bit of the findings: Disc bulging was seen on T3, T4, L4, L5, S1. Patient overall stated he has had sharp pain on the right lower side of thoracic spine close to rib cage in which the radiologist did not see anyting.    OV scheduled for Thursday. Advised him to keep appointment. AM any change to plan?    Ciara IRELAND RN, BSN, PHN  [place] [facility] RN

## 2018-03-27 NOTE — ED AVS SNAPSHOT
[facility] Emergency Department    [address]    [facility] [address]    Phone:  [phone]    Fax:  [phone]                                       Diego Meng   MRN: [id number]    Department:  [facility] Emergency Department   Date of Visit:  3/27/2018           After Visit Summary Signature Page     I have received my discharge instructions, and my questions have been answered. I have discussed any challenges I see with this plan with the nurse or doctor.    ..........................................................................................................................................  Patient/Patient Representative Signature      ..........................................................................................................................................  Patient Representative Print Name and Relationship to Patient    ..................................................               ................................................  Date                                            Time    ..........................................................................................................................................  Reviewed by Signature/Title    ...................................................              ..............................................  Date                                                            Time

## 2018-03-27 NOTE — ED NOTES
"Pt provided d/c instructions and prescription fr medrol pack, pt states \"this is inhumane to not give me pain meds, I don;t that paperwork or prescription it is garbage.\" Pt ambulatory a/ox 3, Declines to sign paperwork.  "

## 2018-03-27 NOTE — TELEPHONE ENCOUNTER
The ED MD said the MRI she was aware of was from August. I cant see any outside records unless I have an encounter with him, so I can't see it.    Skip the MRI and have him come see me Thursday.  Please let him know there is a 99.9% chance I won't continue opioids so he heeds to know that.  Ok to do the opiod script I put in my outbox.

## 2018-03-27 NOTE — TELEPHONE ENCOUNTER
Patient expressed understanding to plan.     Please walk prescription to FD as wife Ifeoma will .    Ciara IRELAND RN, BSN, PHN  [place] [facility] RN

## 2018-03-27 NOTE — ED PROVIDER NOTES
History     Chief Complaint:  Back Pain       HPI   Diego Meng is a 42 year old male with history of radicular lumbar pain, who presents with thoracic back pain.  Patient has a history of chronic low back pain.  Was on chronic opiates and is followed by Dr. Vallecillo in pain clinic.  About 2 months ago he started having pain in his right thoracic spine.  The pain is constant.  It is worse with belching and coughing.  He denies shortness of breath.  Pain is pleuritic.  Denies numbness, tingling or weakness.  Patient had an MRI of his thoracic and lumbar spine on 3/23.  Patient has been taking suboxone and robixone for pain control.  Patient states he still has these medications at home, but that his pain is poorly controlled.  He was unable to get in with his pain specialist Dr. Vallecillo so he came to the ED.  Denies fevers, urinary symptoms, or neurologic changes.      MRI thoracic spine  CONCLUSION:   1.  Essentially normal MRI of the thoracic spine aside from a tiny left  paracentral disc protrusion at the T3-T4 level which does not result in any  spinal cord or nerve root compression.   2. No significant disc or facet degeneration.   3. Central canal adequate in the thoracic region.    Allergies:  Nsaids     Medications:      Methocarbamol (ROBAXIN PO)   methylPREDNISolone (MEDROL DOSEPAK) 4 MG tablet   clonazePAM (KLONOPIN) 1 MG tablet   buprenorphine HCl-naloxone HCl (SUBOXONE) 8-2 MG per film   escitalopram (LEXAPRO) 20 MG tablet   atorvastatin (LIPITOR) 40 MG tablet   esomeprazole (NEXIUM) 20 MG capsule   omega 3 1000 MG CAPS       Past Medical History:    Past Medical History:   Diagnosis Date     Anxiety      Chronic radicular lumbar pain      Opioid dependence (H)      Other chronic pain        Patient Active Problem List    Diagnosis Date Noted     Encounter for long-term (current) use of high-risk medication 08/17/2017     Priority: Medium     Continuous opioid dependence (H) 09/08/2016      "Priority: Medium     Suboxone treatment with [facility]       Panic attack 09/08/2016     Priority: Medium     Starting 3/6: ok to fill 94.5 tabs Clonazepam \"Next month it should be about 3.14 tabs per day, which is difficult to figure out on a day to day basis, but it's about  22 tabs total per week.\"    FYI FILLED 2/1 BUT WASN'T SUPPOSED TO FILL UNTIL 2/4. WE ARE WORKING ON A NEW TAPER OF 10% PER MONTH.       BRIDGER (generalized anxiety disorder) 09/08/2016     Priority: Medium     Hyperlipidemia LDL goal <160 09/08/2016     Priority: Medium     Chronic pain syndrome 09/08/2016     Priority: Medium     Starting 3/6: ok to fill 94.5 tabs Clonazepam \"Next month it should be about 3.14 tabs per day, which is difficult to figure out on a day to day basis, but it's about  22 tabs total per week.\"    FYI FILLED 2/1 BUT WASN'T SUPPOSED TO FILL UNTIL 2/4. WE ARE WORKING ON A NEW TAPER OF 10% PER MONTH.   Last refill: 8/16/16     Last clinic visit: 9/8/16     Clinic visit frequency required: Q 6  months  Next appt: 6 months    Controlled substance agreement on file: Yes:  Date 9/8/16.    Documentation in problem list reviewed:  Yes    Processing:  Patient will  in clinic    RX [place] ([place] reviewed:  reviewed- no concerns  [place]:  [email or web address]         Intractable back pain 05/11/2015     Priority: Medium     [facility] prescribing suboxone, Dr. Serrano       Postoperative back pain 05/10/2015     Priority: Medium     Secondary to diskectomy and laminectomy 5/2015          Past Surgical History:    Past Surgical History:   Procedure Laterality Date     ARTHROSCOPY KNEE WITH MEDIAL MENISCECTOMY Left 11/28/2016    Procedure: ARTHROSCOPY KNEE WITH MEDIAL MENISCECTOMY;  Surgeon: Geovani Kaufman MD;  Location: [facility] OR     BACK SURGERY  5/2015    laminectomy, discectomy L4-L5        Family History:    family history includes DIABETES in his father and paternal " grandfather; Hyperlipidemia in his sister. There is no history of Coronary Artery Disease, Colon Cancer, or Prostate Cancer.    Social History:   reports that he quit smoking about 2 years ago. His smoking use included Cigarettes. He has a 20.00 pack-year smoking history. He has never used smokeless tobacco. He reports that he does not drink alcohol or use illicit drugs.    PCP: Priscilla Murphy     Review of Systems   Constitutional: Negative for chills and fever.   Respiratory: Positive for cough. Negative for shortness of breath.    Cardiovascular: Negative for chest pain.   Gastrointestinal: Positive for diarrhea. Negative for nausea and vomiting.   Genitourinary: Negative for difficulty urinating, flank pain and frequency.   Musculoskeletal: Positive for back pain and gait problem.   Neurological: Negative for weakness and numbness.   All other systems reviewed and are negative.        Physical Exam     Patient Vitals for the past 24 hrs:   BP Temp Temp src Pulse Resp SpO2   03/27/18 1244 119/74 98.1  F (36.7  C) Oral 77 16 98 %        Physical Exam  Constitutional: Alert, attentive, GCS 15, middle aged male sitting up in bed appears uncomfortable   HENT:    Nose: Nose normal.    Mouth/Throat: Oropharynx is clear, mucous membranes are moist   Eyes: Normal conjunctiva. Pupils are equal, round, and reactive to light.   CV: regular rate and rhythm; no murmurs, rubs or gallups  Chest: Effort normal and breath sounds normal.   GI:  There is no tenderness. No distension. Normal bowel sounds  MSK: Normal range of motion.   Back: upper cervical and lower thoracic spinal tenderness, mid-thoracic back tenderness reproducible, no skin changes   Neurological: Alert, attentive, oriented x 4, strength grossly intact, sensation intact   Skin: Skin is warm and dry. No vesicular rashes.     Emergency Department Course     Imaging:  none    Laboratory:  none    Interventions:  none    Emergency Department Course:  Past  medical records, nursing notes, and vitals reviewed.  I performed an exam of the patient and obtained history, as documented above. I discussed recent MRI report with the patient.  Findings and plan explained to the Patient.    At discharge, patient asked nurse for another physician.  Pain policy was discussed with patient.  He then threw away his medrol dose pack prescription and discharge instructions.      Impression & Plan      Medical Decision Makin-year-old male with history of chronic low back pain and opiate abuse, presenting with thoracic back pain.  Differential diagnosis includes thoracic strain, herniated disc, epidural hematoma, acute exacerbation of chronic pain, pulmonary embolism, pneumothorax.  Patient had recent MRI within the last week of his lumbar and thoracic spine given his symptoms of the past 2 months.  There is a small disc protrusion at the T3/T4 level without any nerve root compression   Based on the MRI read.  Patient has not had any new acute symptoms.  His pain is very reproducible with tenderness to palpation of his mid right thoracic back.  He does not have any risk factors for pulmonary embolism and is PERC negative.  This appears to be acute exacerbation of his chronic pain.  There is no indication for repeat emergent imaging.  Patient is already on Suboxone and is followed by a pain management clinic.  I discussed that I did not feel comfortable prescribing him additional narcotics as he has a pain agreement.  Patient did admit to me that he is addicted to opiates, and that he is here today for additional pills to treat his pain.  I discussed alternative options including starting a Solu-Medrol Dosepak to decrease inflammation.  I discussed return precautions with the patient.  Patient was provided discharge instructions and prescription by the nurse, but he felt dissatisfied with his care.    Diagnosis:    ICD-10-CM    1. Chronic right-sided thoracic back pain M54.6      G89.29         Discharge Medications:  New Prescriptions    METHYLPREDNISOLONE (MEDROL DOSEPAK) 4 MG TABLET    Follow package instructions        3/27/2018   Tess Morrissey MD Lum, Marija Margaret, MD  03/27/18 1737

## 2018-03-27 NOTE — ED NOTES
Arrives with thoracic spine pain, hx of chronic pain. Pt PCP and pain MD are out of town. MRI done Friday pt brought in CD taking robaxin no relief. CMS intact. A/ox 3. ABC's intact.

## 2018-03-27 NOTE — TELEPHONE ENCOUNTER
WILTON jimenez was handling this. You may want to forward to her      MRI thoracic spine ordered.  Please help get him in today or tomorrow, then have him scheduled with me on Thursday. Ok to use same day.   Very small amount of oxycodone ordered. Please discuss risk of death, etc with already being on Suboxone (and admitting to using extra), being on high dose benzos, and adding oxycodone.  Please take script from my outbox and have someone walk it to FD. Wife to .

## 2018-04-04 DIAGNOSIS — F41.1 GAD (GENERALIZED ANXIETY DISORDER): ICD-10-CM

## 2018-04-04 DIAGNOSIS — F41.0 PANIC ATTACK: ICD-10-CM

## 2018-04-05 NOTE — TELEPHONE ENCOUNTER
Requested Prescriptions   Pending Prescriptions Disp Refills     clonazePAM (KLONOPIN) 1 MG tablet      Last Written Prescription Date:  2/1/2018  Last Fill Quantity: 105 tablet,   # refills: 0  Last Office Visit: 1/5/2018  Future Office visit:    Next 5 appointments (look out 90 days)     May 04, 2018 11:30 AM CDT   Return Visit with Reba Vallecillo MD   [facility] ([facility])    [address] [address] [address]   [phone]                   Routing refill request to provider for review/approval because:  Drug not on the [facility], [facility] or [facility] refill protocol or controlled substance   105 tablet 0     Sig: Take a maximum of 3.5 tablets per day for the next month (tablets must be spread out throughout the day).    There is no refill protocol information for this order

## 2018-04-06 ENCOUNTER — TELEPHONE (OUTPATIENT)
Dept: PALLIATIVE MEDICINE | Facility: CLINIC | Age: 43
End: 2018-04-06

## 2018-04-06 DIAGNOSIS — Z79.899 ENCOUNTER FOR LONG-TERM (CURRENT) USE OF HIGH-RISK MEDICATION: ICD-10-CM

## 2018-04-06 DIAGNOSIS — G89.4 CHRONIC PAIN SYNDROME: ICD-10-CM

## 2018-04-06 DIAGNOSIS — F11.20 CONTINUOUS OPIOID DEPENDENCE (H): Primary | ICD-10-CM

## 2018-04-06 RX ORDER — CLONAZEPAM 1 MG/1
TABLET ORAL
Qty: 105 TABLET | Refills: 0 | Status: SHIPPED | OUTPATIENT
Start: 2018-04-06 | End: 2018-05-05

## 2018-04-06 RX ORDER — BUPRENORPHINE AND NALOXONE 8; 2 MG/1; MG/1
1 FILM, SOLUBLE BUCCAL; SUBLINGUAL EVERY 12 HOURS
Qty: 52 FILM | Refills: 0 | Status: SHIPPED | OUTPATIENT
Start: 2018-04-09 | End: 2018-05-04

## 2018-04-06 NOTE — TELEPHONE ENCOUNTER
Faxed script to [facility] pharmacy in [place].      Raisa SIEGEL    04/06/18 1:43 PM

## 2018-04-06 NOTE — TELEPHONE ENCOUNTER
Script was faxed by provider to pharmacy. Jailene Washburn  RN-BSN Care Coordinator  [facility]

## 2018-04-06 NOTE — TELEPHONE ENCOUNTER
Patient called he is almost out of the medication and willrun out over the weekend, can it be filled today

## 2018-04-06 NOTE — TELEPHONE ENCOUNTER
Called patient. Advised per below, advised that if must take medication as prescribed otherwise Dr. Vallecillo may not be able to continue to prescribe. Advised that he needs to call sooner for his refills, he states that he called his pharmacy for refill for this earlier in te week. Advised that for Suboxone he should be calling in himself to avoid any missed faxes/calls from pharmacy, advised that Suboxone can only be prescribed by certain providers and therefore it is even more pertinent that he call us in advance. Advised that he had an appointment today that would have been beneficial for him to attend given the recent pain he had.  He states that he had to cancel today due to a sick child. Advised that Dr. Vallecillo has provided a script to last until his next appt.         Trena Washburn  RN-BSN Care Coordinator  [facility]

## 2018-04-06 NOTE — TELEPHONE ENCOUNTER
Called and LM with no patient identifiers, advised that nursing would need to speak to him regarding his refill request. Advised that he will need to call earlier in advance in the future to avoid delays in getting the medication.  Advised that as he is out of his medication this will also need to be discussed with nursing. Advised that it would be pertinent that he attend his follow up appt scheduled for 05/04, particularly as he canceled his appointment for today.    Nursing will need to stress to patient that he must take medication as prescribed-failure to do so would potentially mean provider would no longer be able to prescribe, that attending appointments with his pain provider would be important especially in light of his increased pain/ER visit and needing to obtain oxycodone from PCP.     Routing to provider to review script prepped per below. Please do not close encounter so that nursing can discuss above with patient.  Called pharmacy-  Last picked up Suboxone 8-2mg films, #60, Refill:No  Sig:Ok to dispense on 04/08/18 and start 04/09/18  Last picked up from pharmacy on 03/14/18-#60  Due date Per patient he will be out on Monday 04/09/18    Pt last seen on 01/12/18  Next appt scheduled for 05/04/18     checked in the past 6 months? Yes If no, print current report and give to RN    Last urine drug screen 06/30/18  Current opioid agreement on file Yes Date: 06/30/18- Annual UDS and OC note added to appt note 05/04/18    Processing (pick one):    Fax to [facility]    Will facilitate refill.        I will take my medicines as prescribed.  I will not change the dose or schedule unless my provider tells me to.  There will be no refills if I  run out early.   I may be contacted at any time without warning and asked to complete a drug test or pill count.   I will use one pharmacy to fill all of my controlled substance prescriptions.  If my prescription is mailed to my pharmacy, it may take 5 to 7 days for my  medicine to be ready.

## 2018-04-06 NOTE — TELEPHONE ENCOUNTER
Patient left VM 11:39 am    Rx-Suboxone, will need this Monday.        Mitzi MONTOYA    [facility]

## 2018-04-06 NOTE — TELEPHONE ENCOUNTER
Routing to MA pool gather info    Tierra Vázquez   BSN-RN Care Coordinator  [facility] [place]

## 2018-04-06 NOTE — TELEPHONE ENCOUNTER
VM left today at: 2:27 pm    Patient returning phone call. Please advise.  Ph. [phone]      Patricia CHA    [facility] [place]

## 2018-04-06 NOTE — TELEPHONE ENCOUNTER
Printed, signed, in my outbox.    Priscilla Murphy MD  Internal Medicine/Pediatrics  [facility]

## 2018-04-09 NOTE — TELEPHONE ENCOUNTER
VM left today at: 12:59 pm    Pt calling in regards to his Suboxone medication and if it was filled.   States he is completely out. Would like a phone call.   PH. [phone] (H)      Patricia CHA    [facility] [place]

## 2018-04-09 NOTE — TELEPHONE ENCOUNTER
Called and spoke to pharmacist-script was recieved patient had tried to  too early. Patient picked up his Suboxone today at 1:51pm. Disregarding message below as patient has picked up his medication already.      Trena Washburn  RN-BSN Care Coordinator  [facility]

## 2018-05-03 NOTE — PROGRESS NOTES
"                          [facility]    Date of visit: 5/04/2018    Chief complaint:   Chief Complaint   Patient presents with     Pain     Interval history:  Diego Meng is a 42 year old male last seen by me for follow up on 1/12/18.      Brief history: 42 YEAR OLD MALE who began having low back pain at age 20.  Pain continued and progressed to neck and right shoulder pain for the last 20 years.  History of high dose opioids after having a L4-5 decompression at [facility] by Dr. Rios. Dose escalated and got \"out of control\".  He admits to overuse.  He was transitioned to buprenorphine by Dr. Almonte and was on very high dose 32mg when he was transferred to myself for ongoing care.  He has been successful in decreasing to 16mg daily with better pain control.   PMHx is significant for anxiety.     Since his last visit, Diego Meng reports:  - Had an epidose of thoracic back pain at the end of March.  Was given some oxycodone by he PCP Dr. Murphy that he does not feel was even helpful.  He is upset about how he was treated in the ER.  Feels like he was discriminated against because of his Suboxone.  He continues to have this pain, but it is improved.  Pain is located under his ribcage on the right and is worse upon waking in the am.  Better with pain meds.   -Klonopin 1mg 4 times/day for anxiety for years.  He has been weaning down and is now taking 3.5/day. PCP is suggesting he wean off slowly over the next couple years and he is in agreement.  He has been on them for 10 years.  He is having side effects from the clonazepam - memory issues and trouble getting up in the morning.   - His low back pain has also been worse. Bilateral low back radiating to his buttocks.  He is planning on having a fusion with Dr. Rios in the near future as his quality of life has greatly diminished as a result of this pain.  Has questions about what to do perioperatively with his " suboxone.    - Left foot pain - plantar fascitis. No change  -He did not follow up with sports medicine - Dr. Kaufman.  He has a torn medial meniscus in his left knee and thinks he will likely need surgery for that as well. He had a knee injection a month ago which was not helpful.   -He has had many different injections over the years none of which were helpful for more than a few days.  Recent Trigger point injections (lumbar spine) not helpful   - Full time  (designes health care buildings)   -He has not scheduled with Dr. Benitez. He reports that his anxiety has settled down since getting a new job 3 months ago and he does not need pain psychology  -His headaches are unchanged. They start at the back of his head/neck and radiate through his head to his eyes.  He attributes his headaches to rebound and has a very difficult time discontinuing excedrin.     Pain scores:  Pain intensity on average is 7 (8 last visit) on a scale of 0-10.     Current pain treatments:   Suboxone 8-2 mg #2/day  Excedrin #2-6/day for 20 years  lexapro 20 mg daily  Clonazepam 1 mg #3.5/day  Robaxin 500-1000mg PRN QID    Labs: CRP <2.9, RF <20, ESR 6    Side Effects: no side effects    Past pain treatments:  Diego Meng has been seen at a pain clinic in the past. [facility] Dr. Vázquez and [facility]  Medications:     Medrol dose carolin -no relief              botox -no relief              Anti-migraine: fioricet, migranal, eletriptan, frovatriptan, midrin, naraptriptan, sumatriptan, zolmitripatn -no relief from any of these              Anti-convulsants: depakote, topiramate, gabapentin, lyrica -all of these made him feel fuzzy and forgetful and they were not helpful              Opioids: oxycontin, morphine -no relief, hydrocodone -no relief, tramadol -no relief                Muscle relaxants: soma -no relief, flexeril -no relief and made him tired, norflex -no relief, tizanidine -no relief and made him  tired              NSAIDs: allergic to this class of medications -rash, abdominal pain and cramping, toradol -upset stomach              Anti-depressants: cymbalta -no relief                Topicals: lidocaine -no relief  PT: [facility] in 2016 -helped for shoulder but flared up back and neck  Psychology: yes for pain at the [facility] -'continues to use these skills'  Acupuncture: tried it -no relief  Chiropractic care: tried it -helped initially but then flared up pain  TENS Unit: no relief  Injections: R L4 and L5 TF BAY 12/24/16 -flared pain, cervical epidural steroid injection -provided 2-4 weeks of relief but had significant flare up for 2 weeks post-procedure, cervical trigger point injections -minimal relief, lumbar medial branch block -no relief, lumbar paraspinal trigger point injection 5/17/17 - not helpful  Surgery: right L4-L5 hemilaminectomy May 2015 Dr. Rios    Medications:  Current Outpatient Prescriptions   Medication Sig Dispense Refill     atorvastatin (LIPITOR) 40 MG tablet Take 1 tablet (40 mg) by mouth daily 90 tablet 3     buprenorphine HCl-naloxone HCl (SUBOXONE) 8-2 MG per film Place 1 Film under the tongue every 12 hours for 26 days Ok to dispense on 04/08/18 and start 04/09/18 52 Film 0     clonazePAM (KLONOPIN) 1 MG tablet Take a maximum of 3.5 tablets per day for the next month (tablets must be spread out throughout the day). 105 tablet 0     escitalopram (LEXAPRO) 20 MG tablet Take 1 tablet (20 mg) by mouth daily 90 tablet 3     esomeprazole (NEXIUM) 20 MG capsule Take 1 capsule (20 mg) by mouth every morning (before breakfast) Take 30-60 minutes before eating. 30 capsule 0     Methocarbamol (ROBAXIN PO)        methylPREDNISolone (MEDROL DOSEPAK) 4 MG tablet Follow package instructions 21 tablet 0     naloxone (NARCAN) nasal spray Spray 1 spray (4 mg) into one nostril alternating nostrils as needed for opioid reversal every 2-3 minutes until  "assistance arrives 0.2 mL 0     omega 3 1000 MG CAPS Take 1 g by mouth daily 90 capsule      oxyCODONE IR (ROXICODONE) 5 MG tablet Take 1 tablet (5 mg) by mouth every 6 hours as needed for severe pain maximum 6 tablet(s) per day 8 tablet 0       Medical History: any changes in medical history since they were last seen? no    Review of Systems:  The 14 system ROS was reviewed from the intake questionnaire, and is positive for: headache, joint pain, stiffness, back pain, neck pain and anxiety  Any bowel or bladder problems: none  Mood: \"okay\"    Physical Exam:  Blood pressure 127/73, pulse 72, SpO2 95 %.  General: no acute distress, pleasant, sitting comfortably   Gait: antalgic gait  MSK: full strength but with some guarding due to pain    Imaging:  Thoracic MRI 3/23/2018:       Lumbar MRI completed on 4/24/17:  IMPRESSION:  1. L5-S1 small caudally dissecting 0.3 cm disc extrusion slightly to  the left of midline without apparent neural impingement or stenosis.  2. L4-L5 small central disc protrusion, without apparent neural  impingement or central stenosis, and mild bilateral foraminal  stenosis.    Cervical spine MRI completed on 11/14/14:  CONCLUSION:  1. Moderate left foraminal stenosis at C5-C6.  2. Other relatively minor degenerative changes, no further high-grade central canal or foraminal stenosis.     Right shoulder x-ray completed on 4/2/15:  FINDINGS: No evidence of fracture or dislocation. Normal glenohumeral joint alignment without evidence of degenerative changes. No evidence of significant acromioclavicular joint degenerative joint disease. No acromial or coracoid enthesophyte. Mild undersurface acromial sclerosis. Downslope type three acromion. No blastic or lytic lesions.  IMPRESSION:  Right shoulder type three acromion otherwise within normal limits.     [place] Board of Pharmacy Data Base Reviewed: YES; as expected, no concern for abuse or misuse    Assessment:   1. Chronic pain syndrome - " "widespread pain  2. Opioid dependency   3. Chronic low back pain.   S/P Right L4-L5 hemilaminectomy partial facetectomy in May 2015 with Dr. Rios.   Currently planning to have a lumbar fusion in the future.  4. Chronic neck pain. Cervical facet arthropathy.  5. Chronic headaches. Likely related to facet arthropathy, psychogenic, and rebound (excedrin)  6. Anxiety with panic attacks. Takes lexapro and clonazepam. Stable.  7. Benzodiazepine dependence - has been on Clonazepam for about 10 years without any abuse or misuse.  Plan is to taper off this over the next couple years secondary to side effects of sedation and memory loss.  His PCP is managing this taper.   8. Tobacco use disorder  9. Chronic bilateral knee pain. Left knee meniscus repair on 11/28/16 with Dr. Kaufman.    10. Chronic right shoulder pain.  11. No self cares. Reliant on medication. Chemically coping.     Plan:  1. Pain Psychology - Recommended he schedule with Jaye, however, he is reluctant to do this and states he has done this at [facility] and they discharged him as \"stable\".   2. PT - YES - highly recommended and discussed in detail at today's visit, however he does not feel he will benefit from this.    3. Medication Management:    1. UDS -  8/17/2017 normal / Opioid agreement - 6/30/2017 -  repeat UDS done today   2. Suboxone 8mg films - Increased from BID to TID today- 2 refills given  He was able to decrease his dose from 32mg daily to 16mg daily in the past without any problem.     3. Discussed his recent pain crisis in an effort to come up with a plan if this should happen again.  He felt abandoned by the health care team.  Explained that if he is having 10/10 pain I cannot simply give him a prescription for oxycodone without a workup.  Acute new pain needs to be evaluated in an ER/UC or by his PCP.    4. Labs - CRP, ESR, RF were reviewed, all normal  5. OTHER:  Call if you are planning on having surgery so we can come up with a " jeanine-operative pain plan.   5. Follow up with Dr. Vallecillo - 3 months - August 2018      Total time spent was 30 minutes, and more than 50% of face to face time was spent in counseling and/or coordination of care regarding the above assessment and plan.    Reba Vallecillo MD [facility]  [facility]

## 2018-05-04 ENCOUNTER — OFFICE VISIT (OUTPATIENT)
Dept: PALLIATIVE MEDICINE | Facility: CLINIC | Age: 43
End: 2018-05-04
Payer: COMMERCIAL

## 2018-05-04 VITALS — SYSTOLIC BLOOD PRESSURE: 127 MMHG | DIASTOLIC BLOOD PRESSURE: 73 MMHG | HEART RATE: 72 BPM | OXYGEN SATURATION: 95 %

## 2018-05-04 DIAGNOSIS — Z79.899 ENCOUNTER FOR LONG-TERM (CURRENT) USE OF HIGH-RISK MEDICATION: ICD-10-CM

## 2018-05-04 DIAGNOSIS — G89.4 CHRONIC PAIN SYNDROME: ICD-10-CM

## 2018-05-04 DIAGNOSIS — F41.1 GAD (GENERALIZED ANXIETY DISORDER): ICD-10-CM

## 2018-05-04 DIAGNOSIS — M54.9 INTRACTABLE BACK PAIN: ICD-10-CM

## 2018-05-04 DIAGNOSIS — F41.0 PANIC ATTACK: ICD-10-CM

## 2018-05-04 DIAGNOSIS — F11.20 CONTINUOUS OPIOID DEPENDENCE (H): Primary | ICD-10-CM

## 2018-05-04 DIAGNOSIS — F11.20 CONTINUOUS OPIOID DEPENDENCE (H): ICD-10-CM

## 2018-05-04 LAB
AMPHETAMINES UR QL: NOT DETECTED NG/ML
BARBITURATES UR QL SCN: NOT DETECTED NG/ML
BENZODIAZ UR QL SCN: ABNORMAL NG/ML
BUPRENORPHINE UR QL: ABNORMAL NG/ML
CANNABINOIDS UR QL: NOT DETECTED NG/ML
COCAINE UR QL SCN: NOT DETECTED NG/ML
D-METHAMPHET UR QL: NOT DETECTED NG/ML
METHADONE UR QL SCN: NOT DETECTED NG/ML
OPIATES UR QL SCN: NOT DETECTED NG/ML
OXYCODONE UR QL SCN: NOT DETECTED NG/ML
PCP UR QL SCN: NOT DETECTED NG/ML
PROPOXYPH UR QL: NOT DETECTED NG/ML
TRICYCLICS UR QL SCN: NOT DETECTED NG/ML

## 2018-05-04 PROCEDURE — 80306 DRUG TEST PRSMV INSTRMNT: CPT | Performed by: ANESTHESIOLOGY

## 2018-05-04 PROCEDURE — 99214 OFFICE O/P EST MOD 30 MIN: CPT | Performed by: ANESTHESIOLOGY

## 2018-05-04 RX ORDER — BUPRENORPHINE AND NALOXONE 8; 2 MG/1; MG/1
1 FILM, SOLUBLE BUCCAL; SUBLINGUAL EVERY 8 HOURS
Qty: 90 FILM | Refills: 2 | Status: SHIPPED | OUTPATIENT
Start: 2018-05-04 | End: 2018-07-27

## 2018-05-04 ASSESSMENT — PAIN SCALES - GENERAL: PAINLEVEL: SEVERE PAIN (7)

## 2018-05-04 NOTE — NURSING NOTE
"Chief Complaint   Patient presents with     Pain       Initial /73  Pulse 72  SpO2 95% Estimated body mass index is 30.06 kg/(m^2) as calculated from the following:    Height as of 1/5/18: 1.784 m (5' 10.25\").    Weight as of 1/12/18: 95.7 kg (211 lb).        Minoo GONZALEZ LPN  [facility] [place]     "

## 2018-05-04 NOTE — NURSING NOTE
Rx faxed to:        [id number] - [place], [place] - [address] AT [place] [address]  [address] [facility] [address]  Phone: [phone] Fax: [phone]      Fax confirmation received. Hardcopy destroyed.     Joana Gutierrez, [facility]

## 2018-05-04 NOTE — MR AVS SNAPSHOT
After Visit Summary   5/4/2018    Diego Meng    MRN: [id number]           Patient Information     Date Of Birth          1975        Visit Information        Provider Department      5/4/2018 11:30 AM Reba Vallecillo MD [facility]        Today's Diagnoses     Chronic pain syndrome        Continuous opioid dependence (H)          Care Instructions    1. Follow up in 3 months - you have been given a prescription for suboxone with 2 refills. We are increasing the dose from 8mg twice a day to 8mg three times a day.     Reba Vallecillo MD           Follow-ups after your visit        Who to contact     If you have questions or need follow up information about today's clinic visit or your schedule please contact [facility] directly at [phone].  Normal or non-critical lab and imaging results will be communicated to you by MyChart, letter or phone within 4 business days after the clinic has received the results. If you do not hear from us within 7 days, please contact the clinic through MyChart or phone. If you have a critical or abnormal lab result, we will notify you by phone as soon as possible.  Submit refill requests through [facility] or call your pharmacy and they will forward the refill request to us. Please allow 3 business days for your refill to be completed.          Additional Information About Your Visit        MyChart Information     [facility] gives you secure access to your electronic health record. If you see a primary care provider, you can also send messages to your care team and make appointments. If you have questions, please call your primary care clinic.  If you do not have a primary care provider, please call [phone] and they will assist you.        Care EveryWhere ID     This is your Care EveryWhere ID. This could be used by other organizations to access your [facility] medical records  [id number]        Your Vitals Were     Pulse Pulse  Oximetry                72 95%           Blood Pressure from Last 3 Encounters:   05/04/18 127/73   03/27/18 119/74   01/12/18 113/75    Weight from Last 3 Encounters:   01/12/18 95.7 kg (211 lb)   01/05/18 93.8 kg (206 lb 11.2 oz)   11/21/17 95.8 kg (211 lb 1.6 oz)              Today, you had the following     No orders found for display         Today's Medication Changes          These changes are accurate as of 5/4/18 12:10 PM.  If you have any questions, ask your nurse or doctor.               These medicines have changed or have updated prescriptions.        Dose/Directions    buprenorphine HCl-naloxone HCl 8-2 MG per film   Commonly known as:  SUBOXONE   This may have changed:    - when to take this  - additional instructions   Used for:  Chronic pain syndrome, Continuous opioid dependence (H)        Dose:  1 Film   Place 1 Film under the tongue every 8 hours Ok to dispense on 05/04/18 and start 05/05/18   Quantity:  90 Film   Refills:  2            Where to get your medicines      Some of these will need a paper prescription and others can be bought over the counter.  Ask your nurse if you have questions.     Bring a paper prescription for each of these medications     buprenorphine HCl-naloxone HCl 8-2 MG per film                Primary Care Provider Office Phone # Fax #    Priscilla Murphy [phone]       [address] [place] [address]        Equal Access to Services     PA DUMONT AH: Olga Eaton, waaxda janina, qaybta kaaljamel montiel adeblane crouch. So [facility] [phone].    ATENCIÓN: Si habla español, tiene a yanez disposición servicios gratuitos de asistencia lingüística. Huey al [phone].    We comply with applicable federal civil rights laws and [place] laws. We do not discriminate on the basis of race, color, national origin, age, disability, sex, sexual orientation, or gender identity.            Thank you!      Thank you for choosing [facility]  for your care. Our goal is always to provide you with excellent care. Hearing back from our patients is one way we can continue to improve our services. Please take a few minutes to complete the written survey that you may receive in the mail after your visit with us. Thank you!             Your Updated Medication List - Protect others around you: Learn how to safely use, store and throw away your medicines at www.disposemymeds.org.          This list is accurate as of 5/4/18 12:10 PM.  Always use your most recent med list.                   Brand Name Dispense Instructions for use Diagnosis    atorvastatin 40 MG tablet    LIPITOR    90 tablet    Take 1 tablet (40 mg) by mouth daily    Hyperlipidemia LDL goal <160       buprenorphine HCl-naloxone HCl 8-2 MG per film    SUBOXONE    90 Film    Place 1 Film under the tongue every 8 hours Ok to dispense on 05/04/18 and start 05/05/18    Chronic pain syndrome, Continuous opioid dependence (H)       clonazePAM 1 MG tablet    klonoPIN    105 tablet    Take a maximum of 3.5 tablets per day for the next month (tablets must be spread out throughout the day).    Panic attack, BRIDGER (generalized anxiety disorder)       escitalopram 20 MG tablet    LEXAPRO    90 tablet    Take 1 tablet (20 mg) by mouth daily    BRIDGER (generalized anxiety disorder)       esomeprazole 20 MG CR capsule    nexIUM    30 capsule    Take 1 capsule (20 mg) by mouth every morning (before breakfast) Take 30-60 minutes before eating.        methylPREDNISolone 4 MG tablet    MEDROL DOSEPAK    21 tablet    Follow package instructions        naloxone nasal spray    NARCAN    0.2 mL    Spray 1 spray (4 mg) into one nostril alternating nostrils as needed for opioid reversal every 2-3 minutes until assistance arrives    Bilateral thoracic back pain, unspecified chronicity       omega 3 1000 MG Caps     90 capsule    Take 1 g by mouth daily        oxyCODONE IR 5 MG  tablet    ROXICODONE    8 tablet    Take 1 tablet (5 mg) by mouth every 6 hours as needed for severe pain maximum 6 tablet(s) per day    Right-sided thoracic back pain, unspecified chronicity       ROBAXIN PO

## 2018-05-04 NOTE — LETTER
[facility] [place]    05/04/18    Patient: Diego Meng  YOB: 1975  Medical Record Number: [id number]                                                                  Controlled Substance Agreement  I understand that my care provider has prescribed controlled substances (narcotics, tranquilizers, and/or stimulants) to help manage my condition(s).  I am taking this medicine to help me function or work.  I know that this is strong medicine.  It could have serious side effects and even cause a dependency on the drug.  If I stop these medicines suddenly, I could have severe withdrawal symptoms.    The risks, benefits, and side effects of these medication(s) were explained to me.  I agree that:  1. I will take part in other treatments as advised by my provider.  This may be psychiatry or counseling, physical therapy, behavioral therapy, group treatment, or a referral to a pain clinic.  I will reduce or stop my medicine when my provider tells me to do so.   2. I will take my medicines as prescribed.  I will not change the dose or schedule unless my provider tells me to.  There will be no refills if I  run out early.   I may be contacted at any time without warning and asked to complete a drug test or pill count.   3. I will keep all my appointments at the clinic.  If I miss appointments or fail to follow instructions, my provider may stop my medicine.  4. I will not ask other providers to prescribe controlled substances. And I will not accept controlled substances from other people. If I need another prescribed controlled substance for a new reason, I will notify my provider within one business day.  5. If I enroll in the [place] Medical Marijuana program, I will tell my provider.  I will also sign an agreement to share my medical records with my provider.  6. I will use one pharmacy to fill all of my controlled substance prescriptions.  If my prescription is mailed to my  pharmacy, it may take 5 to 7 days for my medicine to be ready.  7. I understand that my provider, clinic care team, and pharmacy can track controlled substance prescriptions from other providers through a central database (prescription monitoring program).  8. I will bring in my list of medications (or my medicine bottles) each time I come to the clinic.  REV- 04/2016                                                                                                                                            Page 1 of 2      [facility] [place]    05/04/18    Patient: Diego Meng  YOB: 1975  Medical Record Number: [id number]    9. Refills of controlled substances will be made only during office hours.  It is up to me to make sure that I do not run out of my medicines on weekends or holidays.    10. I am responsible for my prescriptions.  If the medicine is lost or stolen, it will not be replaced.   I also agree not to share these medicines with anyone.  11. I agree to not use ANY illegal or recreational drugs.  This includes marijuana, cocaine, bath salts or other drugs.  I agree not to use alcohol unless my provider says I may.  I agree to give urine samples whenever asked.  If I fail to give a urine sample, the provider may stop my medicine.     12. I will tell my nurse or provider right away if I become pregnant or have a new medical problem treated outside of [facility].  13. I understand that this medicine can affect my thinking and judgment.  It may be unsafe for me to drive, use machinery and do dangerous tasks.  I will not do any of these things until I know how the medicine affects me.  If my dose changes, I will wait to see how it affects me.  I will contact my provider if I have concerns about medicine side effects.  I understand that if I do not follow any of the conditions above, my prescriptions or treatment may be stopped.    I agree that my provider,  clinic care team, and pharmacy may work with any city, state or federal law enforcement agency that investigates the misuse, sale, or other diversion of my controlled medicine. I will allow my provider to discuss my care with or share a copy of this agreement with any other treating provider, pharmacy or emergency room where I receive care.  I agree to give up (waive) any right of privacy or confidentiality with respect to these authorizations.   I have read this agreement and have asked questions about anything I did not understand.   ___________________________________    ___________________________  Patient Signature                                                           Date and Time  ___________________________________     ____________________________  Witness                                                                            Date and Time  ___________________________________  Reba Vallecillo MD  REV-  04/2016                                                                                                                                                                 Page 2 of 2

## 2018-05-04 NOTE — LETTER
[facility] [place]    05/04/18    Patient: Diego Meng  YOB: 1975  Medical Record Number: [id number]                                                                  Controlled Substance Agreement  I understand that my care provider has prescribed controlled substances (narcotics, tranquilizers, and/or stimulants) to help manage my condition(s).  I am taking this medicine to help me function or work.  I know that this is strong medicine.  It could have serious side effects and even cause a dependency on the drug.  If I stop these medicines suddenly, I could have severe withdrawal symptoms.    The risks, benefits, and side effects of these medication(s) were explained to me.  I agree that:  1. I will take part in other treatments as advised by my provider.  This may be psychiatry or counseling, physical therapy, behavioral therapy, group treatment, or a referral to a pain clinic.  I will reduce or stop my medicine when my provider tells me to do so.   2. I will take my medicines as prescribed.  I will not change the dose or schedule unless my provider tells me to.  There will be no refills if I  run out early.   I may be contacted at any time without warning and asked to complete a drug test or pill count.   3. I will keep all my appointments at the clinic.  If I miss appointments or fail to follow instructions, my provider may stop my medicine.  4. I will not ask other providers to prescribe controlled substances. And I will not accept controlled substances from other people. If I need another prescribed controlled substance for a new reason, I will notify my provider within one business day.  5. If I enroll in the [place] Medical Marijuana program, I will tell my provider.  I will also sign an agreement to share my medical records with my provider.  6. I will use one pharmacy to fill all of my controlled substance prescriptions.  If my prescription is mailed to my  pharmacy, it may take 5 to 7 days for my medicine to be ready.  7. I understand that my provider, clinic care team, and pharmacy can track controlled substance prescriptions from other providers through a central database (prescription monitoring program).  8. I will bring in my list of medications (or my medicine bottles) each time I come to the clinic.  REV- 04/2016                                                                                                                                            Page 1 of 2      [facility] [place]    05/04/18    Patient: Diego Meng  YOB: 1975  Medical Record Number: [id number]    9. Refills of controlled substances will be made only during office hours.  It is up to me to make sure that I do not run out of my medicines on weekends or holidays.    10. I am responsible for my prescriptions.  If the medicine is lost or stolen, it will not be replaced.   I also agree not to share these medicines with anyone.  11. I agree to not use ANY illegal or recreational drugs.  This includes marijuana, cocaine, bath salts or other drugs.  I agree not to use alcohol unless my provider says I may.  I agree to give urine samples whenever asked.  If I fail to give a urine sample, the provider may stop my medicine.     12. I will tell my nurse or provider right away if I become pregnant or have a new medical problem treated outside of [facility].  13. I understand that this medicine can affect my thinking and judgment.  It may be unsafe for me to drive, use machinery and do dangerous tasks.  I will not do any of these things until I know how the medicine affects me.  If my dose changes, I will wait to see how it affects me.  I will contact my provider if I have concerns about medicine side effects.  I understand that if I do not follow any of the conditions above, my prescriptions or treatment may be stopped.    I agree that my provider,  clinic care team, and pharmacy may work with any city, state or federal law enforcement agency that investigates the misuse, sale, or other diversion of my controlled medicine. I will allow my provider to discuss my care with or share a copy of this agreement with any other treating provider, pharmacy or emergency room where I receive care.  I agree to give up (waive) any right of privacy or confidentiality with respect to these authorizations.   I have read this agreement and have asked questions about anything I did not understand.   ___________________________________    ___________________________  Patient Signature                                                           Date and Time  ___________________________________     ____________________________  Witness                                                                            Date and Time  ___________________________________  Reba Vallecillo MD  REV-  04/2016                                                                                                                                                                 Page 2 of 2

## 2018-05-04 NOTE — LETTER
[facility] [place]    05/04/18    Patient: Diego Meng  YOB: 1975  Medical Record Number: [id number]                                                                  Controlled Substance Agreement  I understand that my care provider has prescribed controlled substances (narcotics, tranquilizers, and/or stimulants) to help manage my condition(s).  I am taking this medicine to help me function or work.  I know that this is strong medicine.  It could have serious side effects and even cause a dependency on the drug.  If I stop these medicines suddenly, I could have severe withdrawal symptoms.    The risks, benefits, and side effects of these medication(s) were explained to me.  I agree that:  1. I will take part in other treatments as advised by my provider.  This may be psychiatry or counseling, physical therapy, behavioral therapy, group treatment, or a referral to a pain clinic.  I will reduce or stop my medicine when my provider tells me to do so.   2. I will take my medicines as prescribed.  I will not change the dose or schedule unless my provider tells me to.  There will be no refills if I  run out early.   I may be contacted at any time without warning and asked to complete a drug test or pill count.   3. I will keep all my appointments at the clinic.  If I miss appointments or fail to follow instructions, my provider may stop my medicine.  4. I will not ask other providers to prescribe controlled substances. And I will not accept controlled substances from other people. If I need another prescribed controlled substance for a new reason, I will notify my provider within one business day.  5. If I enroll in the [place] Medical Marijuana program, I will tell my provider.  I will also sign an agreement to share my medical records with my provider.  6. I will use one pharmacy to fill all of my controlled substance prescriptions.  If my prescription is mailed to my  pharmacy, it may take 5 to 7 days for my medicine to be ready.  7. I understand that my provider, clinic care team, and pharmacy can track controlled substance prescriptions from other providers through a central database (prescription monitoring program).  8. I will bring in my list of medications (or my medicine bottles) each time I come to the clinic.  REV- 04/2016                                                                                                                                            Page 1 of 2      [facility] [place]    05/04/18    Patient: Diego Meng  YOB: 1975  Medical Record Number: [id number]    9. Refills of controlled substances will be made only during office hours.  It is up to me to make sure that I do not run out of my medicines on weekends or holidays.    10. I am responsible for my prescriptions.  If the medicine is lost or stolen, it will not be replaced.   I also agree not to share these medicines with anyone.  11. I agree to not use ANY illegal or recreational drugs.  This includes marijuana, cocaine, bath salts or other drugs.  I agree not to use alcohol unless my provider says I may.  I agree to give urine samples whenever asked.  If I fail to give a urine sample, the provider may stop my medicine.     12. I will tell my nurse or provider right away if I become pregnant or have a new medical problem treated outside of [facility].  13. I understand that this medicine can affect my thinking and judgment.  It may be unsafe for me to drive, use machinery and do dangerous tasks.  I will not do any of these things until I know how the medicine affects me.  If my dose changes, I will wait to see how it affects me.  I will contact my provider if I have concerns about medicine side effects.  I understand that if I do not follow any of the conditions above, my prescriptions or treatment may be stopped.    I agree that my provider,  clinic care team, and pharmacy may work with any city, state or federal law enforcement agency that investigates the misuse, sale, or other diversion of my controlled medicine. I will allow my provider to discuss my care with or share a copy of this agreement with any other treating provider, pharmacy or emergency room where I receive care.  I agree to give up (waive) any right of privacy or confidentiality with respect to these authorizations.   I have read this agreement and have asked questions about anything I did not understand.   ___________________________________    ___________________________  Patient Signature                                                           Date and Time  ___________________________________     ____________________________  Witness                                                                            Date and Time  ___________________________________  Reba Vallecillo MD  REV-  04/2016                                                                                                                                                                 Page 2 of 2

## 2018-05-07 ENCOUNTER — TELEPHONE (OUTPATIENT)
Dept: PALLIATIVE MEDICINE | Facility: CLINIC | Age: 43
End: 2018-05-07

## 2018-05-07 NOTE — TELEPHONE ENCOUNTER
Prior Authorization Retail Medication Request    Medication/Dose: buprenorphine HCl-naloxone HCl (SUBOXONE) 8-2 MG per film  ICD code (if different than what is on RX):  Continuous opioid dependence (H) [F11.20, AND Chronic pain syndrome [G89.4  Previously Tried and Failed:    Rationale:      Insurance Name:  [facility]  Insurance ID:  [id number]      Pharmacy Information (if different than what is on RX)    [facility] [address] [place] [address] [id number] [place] [id number]  Phone: [phone] Fax: [phone]

## 2018-05-07 NOTE — TELEPHONE ENCOUNTER
Central Prior Authorization Team   Phone: [phone]    PA Initiation    Medication: buprenorphine HCl-naloxone HCl (SUBOXONE) 8-2 MG per film  Insurance Company: [employer] [place] - Phone [phone] Fax [phone]  Pharmacy Filling the Rx: [facility] [address] [place] [address] [id number] AT [place] [address]  Filling Pharmacy Phone: [phone]  Filling Pharmacy Fax:    Start Date: 5/7/2018      ]

## 2018-05-07 NOTE — TELEPHONE ENCOUNTER
Prior Authorization Approval    Authorization Effective Date: 5/4/2018  Authorization Expiration Date: 11/4/2018  Medication: buprenorphine HCl-naloxone HCl (SUBOXONE) 8-2 MG per film  Approved Dose/Quantity:    Reference #:     Insurance Company: [employer] [place] - Phone [phone] Fax [phone]  Expected CoPay:       CoPay Card Available:      Foundation Assistance Needed:    Which Pharmacy is filling the prescription (Not needed for infusion/clinic administered[facility] [facility] [address] [place] [address] [id number] AT [place] [address]  Pharmacy Notified: Yes  Patient Notified: Yes

## 2018-05-15 ENCOUNTER — TELEPHONE (OUTPATIENT)
Dept: PALLIATIVE MEDICINE | Facility: CLINIC | Age: 43
End: 2018-05-15

## 2018-05-15 NOTE — TELEPHONE ENCOUNTER
Please note Mychart encounter dated 05/15/18 as well.     Trena MARCN, RN Care Coordinator  [facility]

## 2018-05-15 NOTE — TELEPHONE ENCOUNTER
PA was approve on 5/7/2018.(See telephone encounter 5/7/2018). Patient notified of approval. He will contact [facility] about getting the last 30 films.     Routing to RN pool to call patient to discuss Lumbar pain.     Joana Gutierrez, [facility]

## 2018-05-15 NOTE — TELEPHONE ENCOUNTER
Called patient. He states that he is having an acute event in his lower back. His lower back is locked and can barley move. Currently taking 3 suboxone a day, this is barley touching it. He states that he has not touch base to with PCP yet. No new numbness/tingling or weakness. Advised per below from his last AVS.  Advised that he should touch base with his PCP/UC or ER. Advised that I would also route to provider to review and nursing would contact him accordingly.          3. Discussed his recent pain crisis in an effort to come up with a plan if this should happen again.  He felt abandoned by the health care team.  Explained that if he is having 10/10 pain I cannot simply give him a prescription for oxycodone without a workup.  Acute new pain needs to be evaluated in an ER/UC or by his PCP.      Trena MARCN, RN Care Coordinator  [facility]

## 2018-05-15 NOTE — TELEPHONE ENCOUNTER
"Pt LM at 1024 checking on the status of the PA for his Suboxone. He states he was able to  60 from the pharmacy as he was out, but wasn't able to  the extra 30 that needed the PA.    Patient also states he's having a \"complete meltdown\" with lumbar spine. He is wondering if Dr. Vallecillo can see him ASAP or prescribe a short dose of something to help as it spasms/is locked up. Please call. Phone #[phone]        Samina Cuellar    [facility]    "

## 2018-05-15 NOTE — TELEPHONE ENCOUNTER
Routing to MA pool to check on PA.    Will call patient to discuss his lumbar spine.     Tierra MARCN-RN Care Coordinator  [facility] [place]

## 2018-05-16 NOTE — TELEPHONE ENCOUNTER
I am going to recommend he see his PCP or his surgical team (Dr. Ayala) to have this new acute pain evaluated.  Also, I would reiterate that he continues to chemically cope and could benefit from pain psychology and pain PT to learn other methods to cope (self cares) with his pain that do not involve taking a pill.   I would be willing to give him a muscle relaxant if he thinks that would help.     Reba Vallecillo MD

## 2018-05-17 NOTE — TELEPHONE ENCOUNTER
Providers response below was sent to patient through another open encounter. Closing this to avoid duplicate documentation.     Trena MACIAS, RN Care Coordinator  [facility]

## 2018-07-05 ENCOUNTER — OFFICE VISIT (OUTPATIENT)
Dept: PEDIATRICS | Facility: CLINIC | Age: 43
End: 2018-07-05
Payer: COMMERCIAL

## 2018-07-05 ENCOUNTER — MYC MEDICAL ADVICE (OUTPATIENT)
Dept: ADDICTION MEDICINE | Facility: CLINIC | Age: 43
End: 2018-07-05

## 2018-07-05 VITALS
SYSTOLIC BLOOD PRESSURE: 120 MMHG | DIASTOLIC BLOOD PRESSURE: 64 MMHG | WEIGHT: 207.2 LBS | BODY MASS INDEX: 29.01 KG/M2 | HEIGHT: 71 IN | OXYGEN SATURATION: 96 % | HEART RATE: 81 BPM | TEMPERATURE: 98 F

## 2018-07-05 DIAGNOSIS — M54.9 INTRACTABLE BACK PAIN: ICD-10-CM

## 2018-07-05 DIAGNOSIS — F11.20 CONTINUOUS OPIOID DEPENDENCE (H): ICD-10-CM

## 2018-07-05 DIAGNOSIS — Z76.89 ENCOUNTER TO ESTABLISH CARE: Primary | ICD-10-CM

## 2018-07-05 DIAGNOSIS — G89.4 CHRONIC PAIN SYNDROME: ICD-10-CM

## 2018-07-05 DIAGNOSIS — F33.2 SEVERE EPISODE OF RECURRENT MAJOR DEPRESSIVE DISORDER, WITHOUT PSYCHOTIC FEATURES (H): ICD-10-CM

## 2018-07-05 DIAGNOSIS — F41.1 GAD (GENERALIZED ANXIETY DISORDER): ICD-10-CM

## 2018-07-05 PROCEDURE — 99214 OFFICE O/P EST MOD 30 MIN: CPT | Performed by: INTERNAL MEDICINE

## 2018-07-05 ASSESSMENT — ANXIETY QUESTIONNAIRES
5. BEING SO RESTLESS THAT IT IS HARD TO SIT STILL: NEARLY EVERY DAY
3. WORRYING TOO MUCH ABOUT DIFFERENT THINGS: MORE THAN HALF THE DAYS
1. FEELING NERVOUS, ANXIOUS, OR ON EDGE: NEARLY EVERY DAY
7. FEELING AFRAID AS IF SOMETHING AWFUL MIGHT HAPPEN: NOT AT ALL
6. BECOMING EASILY ANNOYED OR IRRITABLE: NEARLY EVERY DAY
IF YOU CHECKED OFF ANY PROBLEMS ON THIS QUESTIONNAIRE, HOW DIFFICULT HAVE THESE PROBLEMS MADE IT FOR YOU TO DO YOUR WORK, TAKE CARE OF THINGS AT HOME, OR GET ALONG WITH OTHER PEOPLE: VERY DIFFICULT
2. NOT BEING ABLE TO STOP OR CONTROL WORRYING: SEVERAL DAYS
GAD7 TOTAL SCORE: 15

## 2018-07-05 ASSESSMENT — PATIENT HEALTH QUESTIONNAIRE - PHQ9: 5. POOR APPETITE OR OVEREATING: NEARLY EVERY DAY

## 2018-07-05 NOTE — MR AVS SNAPSHOT
After Visit Summary   7/5/2018    Diego Meng    MRN: [id number]           Patient Information     Date Of Birth          1975        Visit Information        Provider Department      7/5/2018 8:50 AM Yogesh cMfadden MD [facility]        Care Instructions    Nice to meet you today!    I am a big believer of mind/body connection. I think it is really important that we use all the tools possible to help treat your pain and your anxiety/depression.     Look through your papers to see which Pain Psychologist Dr. Vallecillo recommended. I'll try looking as well. Goal to get in in the next 2-4 weeks.     Keep taking the Lexapro - we can talk about adding a medication like Buspar in the future but I think the psychology will help you more.     I'll look into the clonazepam taper - in general I think this will help you feel better but we will go slowly. I'll send you a plan and a contract.     I'd like to see you in 4-6 weeks so we can make sure you're connected with psychology and we can talk more about depression/anxiety.           Follow-ups after your visit        Follow-up notes from your care team     Return in about 6 weeks (around 8/16/2018) for Mental Health Follow Up.      Who to contact     If you have questions or need follow up information about today's clinic visit or your schedule please contact [facility] directly at [phone].  Normal or non-critical lab and imaging results will be communicated to you by MyChart, letter or phone within 4 business days after the clinic has received the results. If you do not hear from us within 7 days, please contact the clinic through [employer] or phone. If you have a critical or abnormal lab result, we will notify you by phone as soon as possible.  Submit refill requests through [employer] or call your pharmacy and they will forward the refill request to us. Please allow 3 business days for your refill to be  "completed.          Additional Information About Your Visit        Urgent.lyhart Information     4moms gives you secure access to your electronic health record. If you see a primary care provider, you can also send messages to your care team and make appointments. If you have questions, please call your primary care clinic.  If you do not have a primary care provider, please call [phone] and they will assist you.        Care EveryWhere ID     This is your Care EveryWhere ID. This could be used by other organizations to access your [facility] medical records  [id number]        Your Vitals Were     Pulse Temperature Height Pulse Oximetry BMI (Body Mass Index)       81 98  F (36.7  C) (Oral) 5' 10.5\" (1.791 m) 96% 29.31 kg/m2        Blood Pressure from Last 3 Encounters:   07/05/18 120/64   05/04/18 127/73   03/27/18 119/74    Weight from Last 3 Encounters:   07/05/18 207 lb 3.2 oz (94 kg)   01/12/18 211 lb (95.7 kg)   01/05/18 206 lb 11.2 oz (93.8 kg)              Today, you had the following     No orders found for display       Primary Care Provider Office Phone # Fax #    Priscilla Murphy [phone]       [address] [place] [address]        Equal Access to Services     [facility] AH: Hadii aad ku hadasho Soomaali, waaxda luqadaha, qaybta kaalmada adeegyada, waxay idiin hayaan maurice khoseas alexander . So [facility] [phone].    ATENCIÓN: Si habla español, tiene a yanez disposición servicios gratuitos de asistencia lingüística. Llame al [phone].    We comply with applicable federal civil rights laws and [place] laws. We do not discriminate on the basis of race, color, national origin, age, disability, sex, sexual orientation, or gender identity.            Thank you!     Thank you for choosing [facility]  for your care. Our goal is always to provide you with excellent care. Hearing back from our patients is one way we can continue to improve our services. Please take " a few minutes to complete the written survey that you may receive in the mail after your visit with us. Thank you!             Your Updated Medication List - Protect others around you: Learn how to safely use, store and throw away your medicines at www.disposemymeds.org.          This list is accurate as of 7/5/18  9:34 AM.  Always use your most recent med list.                   Brand Name Dispense Instructions for use Diagnosis    atorvastatin 40 MG tablet    LIPITOR    90 tablet    Take 1 tablet (40 mg) by mouth daily    Hyperlipidemia LDL goal <160       buprenorphine HCl-naloxone HCl 8-2 MG per film    SUBOXONE    90 Film    Place 1 Film under the tongue every 8 hours Ok to dispense on 05/04/18 and start 05/05/18    Chronic pain syndrome, Continuous opioid dependence (H)       clonazePAM 1 MG tablet    klonoPIN    105 tablet    Take a maximum of 3.5 tablets per day for the next month (tablets must be spread out throughout the day).    Panic attack, BRIDGER (generalized anxiety disorder)       escitalopram 20 MG tablet    LEXAPRO    90 tablet    Take 1 tablet (20 mg) by mouth daily    BRIDGER (generalized anxiety disorder)       esomeprazole 20 MG CR capsule    nexIUM    30 capsule    Take 1 capsule (20 mg) by mouth every morning (before breakfast) Take 30-60 minutes before eating.        naloxone nasal spray    NARCAN    0.2 mL    Spray 1 spray (4 mg) into one nostril alternating nostrils as needed for opioid reversal every 2-3 minutes until assistance arrives    Bilateral thoracic back pain, unspecified chronicity       omega 3 1000 MG Caps     90 capsule    Take 1 g by mouth daily        ROBAXIN PO

## 2018-07-05 NOTE — LETTER
[facility]    07/05/18    Patient: Diego Meng  YOB: 1975  Medical Record Number: [id number]                                                                  Controlled Substance Agreement  I understand that my care provider has prescribed controlled substances (narcotics, tranquilizers, and/or stimulants) to help manage my condition(s).  I am taking this medicine to help me function or work.  I know that this is strong medicine.  It could have serious side effects and even cause a dependency on the drug.  If I stop these medicines suddenly, I could have severe withdrawal symptoms.    The risks, benefits, and side effects of these medication(s) were explained to me.  I agree that:  1. I will take part in other treatments as advised by my provider.  This may be psychiatry or counseling, physical therapy, behavioral therapy, group treatment, or a referral to a pain clinic.  I will reduce or stop my medicine when my provider tells me to do so.   2. I will take my medicines as prescribed.  I will not change the dose or schedule unless my provider tells me to.  There will be no refills if I  run out early.   I may be contacted at any time without warning and asked to complete a drug test or pill count.   3. I will keep all my appointments at the clinic.  If I miss appointments or fail to follow instructions, my provider may stop my medicine.  4. I will not ask other providers to prescribe controlled substances. And I will not accept controlled substances from other people. If I need another prescribed controlled substance for a new reason, I will notify my provider within one business day.  5. If I enroll in the [place] Medical Marijuana program, I will tell my provider.  I will also sign an agreement to share my medical records with my provider.  6. I will use one pharmacy to fill all of my controlled substance prescriptions.  If my prescription is mailed to my pharmacy, it may take  5 to 7 days for my medicine to be ready.  7. I understand that my provider, clinic care team, and pharmacy can track controlled substance prescriptions from other providers through a central database (prescription monitoring program).  8. I will bring in my list of medications (or my medicine bottles) each time I come to the clinic.  REV- 04/2016                                                                                                                                            Page 1 of 2      [facility]    07/05/18    Patient: Diego Meng  YOB: 1975  Medical Record Number: [id number]    9. Refills of controlled substances will be made only during office hours.  It is up to me to make sure that I do not run out of my medicines on weekends or holidays.    10. I am responsible for my prescriptions.  If the medicine/prescription is lost or stolen, it will not be replaced.   I also agree not to share these medicines with anyone.  11. I agree to not use ANY illegal or recreational drugs.  This includes marijuana, cocaine, bath salts or other drugs.  I agree not to use alcohol unless my provider says I may.  I agree to give urine samples whenever asked.  If I fail to give a urine sample, the provider may stop my medicine.     12. I will tell my nurse or provider right away if I become pregnant or have a new medical problem treated outside of [facility].  13. I understand that this medicine can affect my thinking and judgment.  It may be unsafe for me to drive, use machinery and do dangerous tasks.  I will not do any of these things until I know how the medicine affects me.  If my dose changes, I will wait to see how it affects me.  I will contact my provider if I have concerns about medicine side effects.  I understand that if I do not follow any of the conditions above, my prescriptions or treatment may be stopped.    I agree that my provider, clinic care team, and pharmacy  may work with any city, state or federal law enforcement agency that investigates the misuse, sale, or other diversion of my controlled medicine. I will allow my provider to discuss my care with or share a copy of this agreement with any other treating provider, pharmacy or emergency room where I receive care.  I agree to give up (waive) any right of privacy or confidentiality with respect to these authorizations.   I have read this agreement and have asked questions about anything I did not understand.   ___________________________________    ___________________________  Patient Signature                                                           Date and Time  ___________________________________     ____________________________  Witness                                                                            Date and Time  ___________________________________  Yogesh Mcfadden MD  REV-  04/2016                                                                                                                                                                 Page 2 of 2  {Controlled Substance Agreement (CSA) Patient Instructions (Optional):939274}

## 2018-07-05 NOTE — PROGRESS NOTES
SUBJECTIVE:   Diego Meng is a 42 year old male who presents to clinic today for the following health issues:    New Patient/Transfer of Care - had trouble getting into his PCP, would like someone with a bit more access.    # Chronic pain  # Continuous opioid dependence  - Follows with Dr. Vallecillo of Pain. Has been able to wean from very high dose 32 mg buprenorphine to 16 mg daily with better pain control. Last note from 5/4/2018 reviewed.   - Sometimes has flairs of his chronic pain - last time was given small dose of narcotic and this wasn't helpful. Needs to be seen by PCP if having a flair and then would like coordination with Dr. Vallecillo.     # Mental Health  # Depression  PHQ-9 SCORE 11/2/2016 6/19/2017 7/5/2018   Total Score 7 5 24     BRIDGER-7 SCORE 4/27/2017 6/19/2017 7/5/2018   Total Score 13 16 15     Feels like is depression has significantly worsened. Certainly pain and frustration from not getting his surgery yet is playing a role. Had to cut down to part time at work.     No active SI- sometimes thinks it would be easier if he were gone. He does have guns in the home - locked away and ammunition . Discussed the option of having someone else hold onto his guns while he is not doing well.     # Anxiety  Also flaring with depression. Has been on klonopin 1mg four times daily for nearly 10 years per report. He agrees that this is actually not helpful for his anxiety and it doesn't make him feel good. He is worried about withdrawal given how long he has been on the medication. He tells me that Dr. Murphy/Priscilla Son came up with a plan to wean over 1-2 years and he is interested in doing that.    # Axial low back pain with some radicular symptoms ×20 years  - Orthopedics = Dr. Rios [facility]   laminectomy on this patient at L4-5 on the right with some improvement in his right-sided symptoms  - Last OV 4/16/18 with plan for surgery: 1. Anterior spinal Fusions L4-S1 with  instrumentation   2. Posterior spinal fusion and Instrumentation L4-S1 with pelvic fixation  - Awaiting approval from [facility] - currently in appeal bc he hasn't completed physical therapy in the last year.    Problem list and histories reviewed & adjusted, as indicated.  Additional history: as documented    Patient Active Problem List   Diagnosis     Postoperative back pain     Intractable back pain     Continuous opioid dependence (H)     Panic attack     BRIDGER (generalized anxiety disorder)     Hyperlipidemia LDL goal <160     Chronic pain syndrome     Encounter for long-term (current) use of high-risk medication     Past Surgical History:   Procedure Laterality Date     ARTHROSCOPY KNEE WITH MEDIAL MENISCECTOMY Left 11/28/2016    Procedure: ARTHROSCOPY KNEE WITH MEDIAL MENISCECTOMY;  Surgeon: Geovani Kaufman MD;  Location: [facility] OR     BACK SURGERY  5/2015    laminectomy, discectomy L4-L5       Social History   Substance Use Topics     Smoking status: Former Smoker     Packs/day: 1.00     Years: 20.00     Types: Cigarettes     Quit date: 11/25/2015     Smokeless tobacco: Never Used     Alcohol use No     Family History   Problem Relation Age of Onset     Diabetes Father      Diabetes Paternal Grandfather      Hyperlipidemia Sister      Coronary Artery Disease No family hx of      Colon Cancer No family hx of      Prostate Cancer No family hx of          Current Outpatient Prescriptions   Medication Sig Dispense Refill     atorvastatin (LIPITOR) 40 MG tablet Take 1 tablet (40 mg) by mouth daily 90 tablet 3     clonazePAM (KLONOPIN) 1 MG tablet Take a maximum of 3.5 tablets per day for the next month (tablets must be spread out throughout the day). 105 tablet needs appt     escitalopram (LEXAPRO) 20 MG tablet Take 1 tablet (20 mg) by mouth daily 90 tablet 3     esomeprazole (NEXIUM) 20 MG capsule Take 1 capsule (20 mg) by mouth every morning (before breakfast) Take 30-60 minutes before eating. 30 capsule 0      "Methocarbamol (ROBAXIN PO)        omega 3 1000 MG CAPS Take 1 g by mouth daily 90 capsule      buprenorphine HCl-naloxone HCl (SUBOXONE) 8-2 MG per film Place 1 Film under the tongue every 8 hours Ok to dispense on 05/04/18 and start 05/05/18 90 Film 2     naloxone (NARCAN) nasal spray Spray 1 spray (4 mg) into one nostril alternating nostrils as needed for opioid reversal every 2-3 minutes until assistance arrives (Patient not taking: Reported on 7/5/2018) 0.2 mL 0     Allergies   Allergen Reactions     Nsaids Rash       Reviewed and updated as needed this visit by clinical staff       Reviewed and updated as needed this visit by Provider         ROS:  Constitutional, HEENT, cardiovascular, pulmonary, GI, , musculoskeletal, neuro, skin, endocrine and psych systems are negative, except as otherwise noted.    OBJECTIVE:     /64  Pulse 81  Temp 98  F (36.7  C) (Oral)  Ht 5' 10.5\" (1.791 m)  Wt 207 lb 3.2 oz (94 kg)  SpO2 96%  BMI 29.31 kg/m2  Body mass index is 29.31 kg/(m^2).  GENERAL: healthy, alert and no distress  RESP: lungs clear to auscultation - no rales, rhonchi or wheezes  CV: regular rate and rhythm, normal S1 S2, no S3 or S4, no murmur, click or rub, no peripheral edema and peripheral pulses strong  ABDOMEN: soft, nontender, no hepatosplenomegaly, no masses and bowel sounds normal  MS: no gross musculoskeletal defects noted, no edema  BACK: no CVA tenderness, lumbar spine tenderness, limited by pain with forward flexion/extension/lateral flexion/rotation.  PSYCH: mentation appears normal, affect normal/bright    Diagnostic Test Results:  none     ASSESSMENT/PLAN:     1. Encounter to establish care  Last Physical/Wellness in 1/2018.   Due for standard HIV testing with next labs.    2. BRIDGER (generalized anxiety disorder)  3. Severe episode of recurrent major depressive disorder, without psychotic features (H)  Depression flairing and making anxiety worse. He has been on lexapro for some time and " overall thinks it is helpful. I discussed the bio-psycho-social model of mental health and explained that I am a firm believer in the mind-body relationship.  Certainly has chronic pain is playing a role in his mental health but also likely it is certain that his depression is playing a role in making his pain worse.  I highly recommended starting counseling.  In particular I recommended pain psychology with CBT.  I was very upfront that I did not think that a single medication would be able to take away his feelings of depression or fully resolve his pain.  He was very understanding and open to the idea of psychology.  He notes that this is been mentioned several times in the past.  We will both work to get the name of a pain psychologist from Dr. Vallecillo.  I also considered adding to his antidepressant regimen.  He notes he has been on the Lexapro for some time and overall it has been helpful.  I considered adding BuSpar to augment and also to help with his anxiety but for now would like to see how he feels with the initiation of therapy.  He has some passive suicidal ideation but denies ever having a plan.  He does have guns in his home and I encouraged him to re-home these until he is feeling better from a mental health standpoint.  There are trigger locks present and ammunition is stored separately.  Crisis information was provided and he will call should he develop any worsening feelings of wanting to harm himself.  We will have close follow-up as per the patient instructions.    Regarding his anxiety and long-term use of clonazepam I explained that my approach is not to use benzodiazepines for a little long-term.  We reviewed that overall these have actually not been helpful in treating his anxiety and have gone to a place where he is more concerned about the withdrawal from the medication.  He does not feel good on these and describes some memory issues and feeling foggy.  I will be in touch with other  primary care doctors that have worked with him to better understand their meaning plan.  I told him I thought to be reasonable to try to get off over the next year but will work with my colleagues and pharmacists at our clinic to come up with the plan and send him a my chart.  I will also be sending him an updated controlled substance contract.  I explained that I do not do early refills or lost scripts may only prescribe 1 month at a time.    May need to reach out to Psychiatry for help with anxiety/depression if not able to wean BZDs.    4. Chronic pain syndrome  5. Continuous opioid dependence (H)  6. Intractable back pain  Follows very closely with Dr. Vallecillo.  In terms of his continuous opioid dependence it looks like he is stable.  His pain seems reasonably well controlled except for when it flares.  I explained to him that I would need to see him when he was having a flare to determine how best to approach it but that in general I do not use opioids for flares of chronic pain.  If it is a new injury or different type of pain that is something we could certainly explore but in general I tend to stay away from this class of medication.  I did commend him for following closely with pain but emphasized that I think following all of their recommendations is critical.  He was in agreement that no one treatment was going to fully resolve his pain.  In particular looks like pain PT is been highly recommended but he has been resistant.  I did not push this in depth today but will bring it up again in the future as I develop rapport.  He is working to get a fusion scheduled with orthopedics.  He is having some difficulty getting this preapproved from his insurance because he is not technically completed physical therapy in this calendar year.  He is working through an appeal with his orthopedist.  If this goes through will need a preop and I recommend he schedule this a week or 2 prior so that I can coordinate with his  pain doctor regarding Suboxone.    Patient Instructions   Nice to meet you today!    I am a big believer of mind/body connection. I think it is really important that we use all the tools possible to help treat your pain and your anxiety/depression.     Look through your papers to see which Pain Psychologist Dr. Vallecillo recommended. I'll try looking as well. Goal to get in in the next 2-4 weeks.     Keep taking the Lexapro - we can talk about adding a medication like Buspar in the future but I think the psychology will help you more.     I'll look into the clonazepam taper - in general I think this will help you feel better but we will go slowly. I'll send you a plan and a contract.     I'd like to see you in 4-6 weeks so we can make sure you're connected with psychology and we can talk more about depression/anxiety.     Yogesh Mcfadden MD  [facility]

## 2018-07-05 NOTE — TELEPHONE ENCOUNTER
My chart sent from patient    Mike Wells, I have a question for you... Some time ago, you suggested that I start treatment with a psychiatrist that specializes in pain patients.  Could you please give me the doctor's name and number so I can get that going?     It was on my discharge papers but, I haven't been able to locate it.     I would appreciate it...!     Thanks!!   Diego MARCN-RN Care Coordinator  [facility]

## 2018-07-05 NOTE — TELEPHONE ENCOUNTER
My chart sent to patient    Suzette Cortez,  The pain psychologist name is Jaye Benitez. You can contact her and make an appointment with her at [phone].    Take care,  Tierra Vázquez   BSN-RN Care Coordinator  [facility]

## 2018-07-05 NOTE — PATIENT INSTRUCTIONS
Nice to meet you today!    I am a big believer of mind/body connection. I think it is really important that we use all the tools possible to help treat your pain and your anxiety/depression.     Look through your papers to see which Pain Psychologist Dr. Vallecillo recommended. I'll try looking as well. Goal to get in in the next 2-4 weeks.     Keep taking the Lexapro - we can talk about adding a medication like Buspar in the future but I think the psychology will help you more.     I'll look into the clonazepam taper - in general I think this will help you feel better but we will go slowly. I'll send you a plan and a contract.     I'd like to see you in 4-6 weeks so we can make sure you're connected with psychology and we can talk more about depression/anxiety.

## 2018-07-06 ASSESSMENT — ANXIETY QUESTIONNAIRES: GAD7 TOTAL SCORE: 15

## 2018-07-06 ASSESSMENT — PATIENT HEALTH QUESTIONNAIRE - PHQ9: SUM OF ALL RESPONSES TO PHQ QUESTIONS 1-9: 24

## 2018-07-09 ENCOUNTER — MYC MEDICAL ADVICE (OUTPATIENT)
Dept: ADDICTION MEDICINE | Facility: CLINIC | Age: 43
End: 2018-07-09

## 2018-07-09 DIAGNOSIS — G89.4 CHRONIC PAIN SYNDROME: Primary | ICD-10-CM

## 2018-07-10 PROBLEM — F33.2 SEVERE EPISODE OF RECURRENT MAJOR DEPRESSIVE DISORDER, WITHOUT PSYCHOTIC FEATURES (H): Status: ACTIVE | Noted: 2018-07-10

## 2018-07-10 NOTE — TELEPHONE ENCOUNTER
My chart sent from patient    Greetings, this is regarding the pain psychiatrist Dr Vallecillo referred me to, Jaye Benitez.. she finally returned my calls and she is not seeing new patients at this time.     Would you have second choice for a psychiatrist that deals with chronic pain and congnitive therapy...?     Please let me know.     Thanks!   Diego MARCN-RN Care Coordinator  [facility]

## 2018-07-10 NOTE — TELEPHONE ENCOUNTER
I placed an order for him to be seen By Kalin Matta at the [facility].  He will be great as he specializes in addiction and chronic pain.  He can call to make an appointment.     Reba Vallecillo MD

## 2018-07-10 NOTE — TELEPHONE ENCOUNTER
My chart sent to patient:    Dr. Avel King referred you to one of our pain psychologists, Geovani Taylor. To make an appointment with him please call [phone]. Thanks!    Take care,    Tierra Vázquez   BSN-RN Care Coordinator  [facility]

## 2018-07-11 ENCOUNTER — MYC MEDICAL ADVICE (OUTPATIENT)
Dept: PEDIATRICS | Facility: CLINIC | Age: 43
End: 2018-07-11

## 2018-07-11 ENCOUNTER — OFFICE VISIT (OUTPATIENT)
Dept: PALLIATIVE MEDICINE | Facility: CLINIC | Age: 43
End: 2018-07-11
Attending: ANESTHESIOLOGY
Payer: COMMERCIAL

## 2018-07-11 DIAGNOSIS — M54.9 INTRACTABLE BACK PAIN: Primary | ICD-10-CM

## 2018-07-11 DIAGNOSIS — F33.2 SEVERE EPISODE OF RECURRENT MAJOR DEPRESSIVE DISORDER, WITHOUT PSYCHOTIC FEATURES (H): ICD-10-CM

## 2018-07-11 PROCEDURE — 96150 HC HEALTH & BEHAVIOR ASSESS INITIAL, EA 15MIN: CPT | Performed by: PSYCHOLOGIST
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
## 2018-07-11 NOTE — MR AVS SNAPSHOT
After Visit Summary   7/11/2018    Diego Meng    MRN: [id number]           Patient Information     Date Of Birth          1975        Visit Information        Provider Department      7/11/2018 1:00 PM Radames Toledo, PhD JOSE [facility]        Today's Diagnoses     Intractable back pain    -  1    Severe episode of recurrent major depressive disorder, without psychotic features (H)           Follow-ups after your visit        Your next 10 appointments already scheduled     Aug 10, 2018  7:30 AM CDT   MyChart Long with Yogesh Mcfadden MD   [facility]    [address]  [address]  [facility] [address]   [phone]            Aug 15, 2018  8:00 AM CDT   Return Visit with Radames Toledo, PhD JOSE   [facility] ([facility]    [address]  [facility] [address] [phone]              Who to contact     If you have questions or need follow up information about today's clinic visit or your schedule please contact [facility] directly at [phone].  Normal or non-critical lab and imaging results will be communicated to you by MyChart, letter or phone within 4 business days after the clinic has received the results. If you do not hear from us within 7 days, please contact the clinic through [employer] or phone. If you have a critical or abnormal lab result, we will notify you by phone as soon as possible.  Submit refill requests through [employer] or call your pharmacy and they will forward the refill request to us. Please allow 3 business days for your refill to be completed.          Additional Information About Your Visit        MyChart Information     The Online [employer] gives you secure access to your electronic health record. If you see a primary care provider, you can also send messages to your care team and make appointments. If you  have questions, please call your primary care clinic.  If you do not have a primary care provider, please call [phone] and they will assist you.        Care EveryWhere ID     This is your Care EveryWhere ID. This could be used by other organizations to access your [place] medical records  [id number]         Blood Pressure from Last 3 Encounters:   07/05/18 120/64   05/04/18 127/73   03/27/18 119/74    Weight from Last 3 Encounters:   07/05/18 94 kg (207 lb 3.2 oz)   01/12/18 95.7 kg (211 lb)   01/05/18 93.8 kg (206 lb 11.2 oz)              We Performed the Following     HEALTH & BEHAVIOR ASSESS INITIAL, EA 15MIN        Primary Care Provider Office Phone # Fax #    Yogesh Mcfadden [phone] [address] [facility] [address]        Equal Access to Services     PA DUMONT : Hadii aad ku hadasho Soomaali, waaxda luqadaha, qaybta kaalmada adeegyada, waxay idiin hayaan maurice alexander . So [facility] [phone].    ATENCIÓN: Si habla español, tiene a yanez disposición servicios gratuitos de asistencia lingüística. Huey al [phone].    We comply with applicable federal civil rights laws and [place] laws. We do not discriminate on the basis of race, color, national origin, age, disability, sex, sexual orientation, or gender identity.            Thank you!     Thank you for choosing [facility]  for your care. Our goal is always to provide you with excellent care. Hearing back from our patients is one way we can continue to improve our services. Please take a few minutes to complete the written survey that you may receive in the mail after your visit with us. Thank you!             Your Updated Medication List - Protect others around you: Learn how to safely use, store and throw away your medicines at www.disposemymeds.org.          This list is accurate as of 7/11/18 11:59 PM.  Always use your most recent med list.                   Brand Name  Dispense Instructions for use Diagnosis    atorvastatin 40 MG tablet    LIPITOR    90 tablet    Take 1 tablet (40 mg) by mouth daily    Hyperlipidemia LDL goal <160       buprenorphine HCl-naloxone HCl 8-2 MG per film    SUBOXONE    90 Film    Place 1 Film under the tongue every 8 hours Ok to dispense on 05/04/18 and start 05/05/18    Chronic pain syndrome, Continuous opioid dependence (H)       escitalopram 20 MG tablet    LEXAPRO    90 tablet    Take 1 tablet (20 mg) by mouth daily    BRIDGER (generalized anxiety disorder)       esomeprazole 20 MG CR capsule    nexIUM    30 capsule    Take 1 capsule (20 mg) by mouth every morning (before breakfast) Take 30-60 minutes before eating.        naloxone nasal spray    NARCAN    0.2 mL    Spray 1 spray (4 mg) into one nostril alternating nostrils as needed for opioid reversal every 2-3 minutes until assistance arrives    Bilateral thoracic back pain, unspecified chronicity       omega 3 1000 MG Caps     90 capsule    Take 1 g by mouth daily        ROBAXIN PO

## 2018-07-12 ENCOUNTER — MYC MEDICAL ADVICE (OUTPATIENT)
Dept: PEDIATRICS | Facility: CLINIC | Age: 43
End: 2018-07-12

## 2018-07-12 DIAGNOSIS — F41.1 GAD (GENERALIZED ANXIETY DISORDER): ICD-10-CM

## 2018-07-12 DIAGNOSIS — F41.0 PANIC ATTACK: ICD-10-CM

## 2018-07-12 RX ORDER — CLONAZEPAM 1 MG/1
TABLET ORAL
Qty: 89 TABLET | Refills: 0 | Status: SHIPPED | OUTPATIENT
Start: 2018-07-14 | End: 2018-08-14

## 2018-07-12 NOTE — TELEPHONE ENCOUNTER
Refill request for: CLONAZEPAM 1 MG - MAX 3.5 TABS/DAY  Fax RX to [facility].  Please send jiffstore message when faxed.     Last rx written: 6/13/18 # 105    **[place]  reviewed- last filled: 6/15, 5/9  Last OV: 7/5/18  for establish care    Unable to fill per standing order routed to Dr. Mcfadden for approval.    Jacquie Leija RN

## 2018-07-13 NOTE — TELEPHONE ENCOUNTER
RX was faxed to pharmacy. [employer] message sent to notify patient.  Minoo Sorto CMA  July 13, 2018, 7:29 AM

## 2018-07-27 ENCOUNTER — OFFICE VISIT (OUTPATIENT)
Dept: PALLIATIVE MEDICINE | Facility: CLINIC | Age: 43
End: 2018-07-27
Payer: COMMERCIAL

## 2018-07-27 VITALS — HEART RATE: 82 BPM | DIASTOLIC BLOOD PRESSURE: 77 MMHG | SYSTOLIC BLOOD PRESSURE: 118 MMHG | OXYGEN SATURATION: 97 %

## 2018-07-27 DIAGNOSIS — Z79.899 ENCOUNTER FOR LONG-TERM (CURRENT) USE OF HIGH-RISK MEDICATION: ICD-10-CM

## 2018-07-27 DIAGNOSIS — F41.1 GAD (GENERALIZED ANXIETY DISORDER): ICD-10-CM

## 2018-07-27 DIAGNOSIS — F17.200 TOBACCO USE DISORDER: ICD-10-CM

## 2018-07-27 DIAGNOSIS — M79.18 MYOFASCIAL PAIN: ICD-10-CM

## 2018-07-27 DIAGNOSIS — F11.20 CONTINUOUS OPIOID DEPENDENCE (H): Primary | ICD-10-CM

## 2018-07-27 DIAGNOSIS — F11.20 CONTINUOUS OPIOID DEPENDENCE (H): ICD-10-CM

## 2018-07-27 DIAGNOSIS — G89.4 CHRONIC PAIN SYNDROME: ICD-10-CM

## 2018-07-27 PROCEDURE — 99214 OFFICE O/P EST MOD 30 MIN: CPT | Performed by: ANESTHESIOLOGY

## 2018-07-27 PROCEDURE — 80306 DRUG TEST PRSMV INSTRMNT: CPT | Performed by: ANESTHESIOLOGY

## 2018-07-27 RX ORDER — BUPRENORPHINE AND NALOXONE 8; 2 MG/1; MG/1
1 FILM, SOLUBLE BUCCAL; SUBLINGUAL EVERY 8 HOURS
Qty: 90 FILM | Refills: 2 | Status: SHIPPED | OUTPATIENT
Start: 2018-07-27 | End: 2018-10-29

## 2018-07-27 ASSESSMENT — PAIN SCALES - GENERAL: PAINLEVEL: SEVERE PAIN (7)

## 2018-07-27 NOTE — MR AVS SNAPSHOT
After Visit Summary   7/27/2018    Diego Meng    MRN: [id number]           Patient Information     Date Of Birth          1975        Visit Information        Provider Department      7/27/2018 11:30 AM Reba Vallecillo MD [facility]        Care Instructions    1. Perioperative plan will be to discontinue your suboxone 6 days prior to surgery.  I will give you a prescription for oxycodone to take to keep you from going into withdrawal prior to surgical date.  After surgery Dr. Rios will need to manage your post-operative pain until it is at a manageable level at which point we will get back on your suboxone.  Your wife will need to manage your prescriptions     2. Continue Suboxone 8mg THREE times a day for now.       [facility] Injection instructions  You had trigger point injections  Meds used:  lidocaine  Nurse line:  [phone]  Appointment line:  [phone]      Go to the emergency room if you develop any shortness of breath    Monitor the injection sites for signs and symptoms of infection-fever, chills, redness, swelling, warmth, or drainage to areas.    Okay to use ice to the areas.    Do not apply heat to sites for at least 12 hours.    You may have soreness at injection sites for up to 24 hours.    If you are able to use anti-inflammatory medications or Tylenol for pain control if necessary, you can take these as directed.  After hours doctor line:  [phone]      ----------------------------------------------------------------  Nurse Triage line:  [phone]   Call this number with any questions or concerns. You may leave a detailed message anytime. Calls are typically returned Monday through Friday between 8 AM and 4:30 PM. We usually get back to you within 2 business days depending on the issue/request.       Medication refills:    For non-narcotic medications, call your pharmacy directly to request a refill. The pharmacy will contact  the [facility] Management Center for authorization. Please allow 3-4 days for these refills to be processed.     For narcotic refills, call the nurse triage line or send a Zulahart message. Please contact us 7-10 days before your refill is due. The message MUST include the name of the specific medication(s) requested and how you would like to receive the prescription(s). The options are as follows:    Pain Clinic staff can mail the prescription to your pharmacy. Please tell us the name of the pharmacy.    You may pick the prescription up at the Pain Clinic (tell us the location) or during a clinic visit with your pain provider    Pain Clinic staff can deliver the prescription to the [facility] pharmacy in the clinic building. Please tell us the location.      Scheduling number: [phone].  Call this number to schedule or change appointments.    We believe regular attendance is key to your success in our program.    Any time you are unable to keep your appointment we ask that you call us at least 24 hours in advance to let us know. This will allow us to offer the appointment time to another patient.               Follow-ups after your visit        Your next 10 appointments already scheduled     Aug 10, 2018  7:30 AM CDT   MyChart Long with Yogesh Mcfadden MD   [facility] ([facility])    [address]  [address]  [facility] [address] [phone]            Aug 15, 2018  8:00 AM CDT   Return Visit with Radames Toledo, PhD [facility] ([facility])    [address] [place] [address] [phone]              Who to contact     If you have questions or need follow up information about today's clinic visit or your schedule please contact [facility] directly at [phone].  Normal or non-critical lab and imaging results will be communicated to you by MyChart, letter or phone within 4 business  days after the clinic has received the results. If you do not hear from us within 7 days, please contact the clinic through Tuee or phone. If you have a critical or abnormal lab result, we will notify you by phone as soon as possible.  Submit refill requests through Tuee or call your pharmacy and they will forward the refill request to us. Please allow 3 business days for your refill to be completed.          Additional Information About Your Visit        [employer] | [employer] Information     Tuee gives you secure access to your electronic health record. If you see a primary care provider, you can also send messages to your care team and make appointments. If you have questions, please call your primary care clinic.  If you do not have a primary care provider, please call [phone] and they will assist you.        Care EveryWhere ID     This is your Care EveryWhere ID. This could be used by other organizations to access your [place] medical records  [id number]        Your Vitals Were     Pulse Pulse Oximetry                82 97%           Blood Pressure from Last 3 Encounters:   07/27/18 118/77   07/05/18 120/64   05/04/18 127/73    Weight from Last 3 Encounters:   07/05/18 94 kg (207 lb 3.2 oz)   01/12/18 95.7 kg (211 lb)   01/05/18 93.8 kg (206 lb 11.2 oz)              Today, you had the following     No orders found for display       Primary Care Provider Office Phone # Fax #    Yogesh Mcfadden [phone] [address] [facility] [address]        Equal Access to Services     PA DUMONT : Hadii aad ku hadasho Soomaali, waaxda luqadaha, qaybta kaalmada adeegyada, jamel alexander . So [facility] [phone].    ATENCIÓN: Si habla español, tiene a yanez disposición servicios gratuitos de asistencia lingüística. Llame al [phone].    We comply with applicable federal civil rights laws and [place] laws. We do not discriminate on the basis of race,  color, national origin, age, disability, sex, sexual orientation, or gender identity.            Thank you!     Thank you for choosing [facility]  for your care. Our goal is always to provide you with excellent care. Hearing back from our patients is one way we can continue to improve our services. Please take a few minutes to complete the written survey that you may receive in the mail after your visit with us. Thank you!             Your Updated Medication List - Protect others around you: Learn how to safely use, store and throw away your medicines at www.disposemymeds.org.          This list is accurate as of 7/27/18 12:03 PM.  Always use your most recent med list.                   Brand Name Dispense Instructions for use Diagnosis    atorvastatin 40 MG tablet    LIPITOR    90 tablet    Take 1 tablet (40 mg) by mouth daily    Hyperlipidemia LDL goal <160       buprenorphine HCl-naloxone HCl 8-2 MG per film    SUBOXONE    90 Film    Place 1 Film under the tongue every 8 hours Ok to dispense on 05/04/18 and start 05/05/18    Chronic pain syndrome, Continuous opioid dependence (H)       clonazePAM 1 MG tablet    klonoPIN    89 tablet    Max 3.3 tabs per day for the first two weeks. Max 3 tabs per day for the second two weeks. At least 6 hours between doses.    Panic attack, BRIDGER (generalized anxiety disorder)       escitalopram 20 MG tablet    LEXAPRO    90 tablet    Take 1 tablet (20 mg) by mouth daily    BRIDGER (generalized anxiety disorder)       esomeprazole 20 MG CR capsule    nexIUM    30 capsule    Take 1 capsule (20 mg) by mouth every morning (before breakfast) Take 30-60 minutes before eating.        naloxone nasal spray    NARCAN    0.2 mL    Spray 1 spray (4 mg) into one nostril alternating nostrils as needed for opioid reversal every 2-3 minutes until assistance arrives    Bilateral thoracic back pain, unspecified chronicity       omega 3 1000 MG Caps     90 capsule    Take 1 g by mouth  daily        ROBAXIN PO

## 2018-07-27 NOTE — NURSING NOTE
"Chief Complaint   Patient presents with     Pain       Initial /77  Pulse 82  SpO2 97% Estimated body mass index is 29.31 kg/(m^2) as calculated from the following:    Height as of 7/5/18: 1.791 m (5' 10.5\").    Weight as of 7/5/18: 94 kg (207 lb 3.2 oz).        Minoo GONZALEZ LPN  [facility] [place]     "

## 2018-07-27 NOTE — PATIENT INSTRUCTIONS
1. Perioperative plan will be to discontinue your suboxone 6 days prior to surgery.  I will give you a prescription for oxycodone to take to keep you from going into withdrawal prior to surgical date.  After surgery Dr. Rios will need to manage your post-operative pain until it is at a manageable level at which point we will get back on your suboxone.  Your wife will need to manage your prescriptions     2. Continue Suboxone 8mg THREE times a day for now.       [facility] Injection instructions  You had trigger point injections  Meds used:  lidocaine  Nurse line:  [phone]  Appointment line:  [phone]      Go to the emergency room if you develop any shortness of breath    Monitor the injection sites for signs and symptoms of infection-fever, chills, redness, swelling, warmth, or drainage to areas.    Okay to use ice to the areas.    Do not apply heat to sites for at least 12 hours.    You may have soreness at injection sites for up to 24 hours.    If you are able to use anti-inflammatory medications or Tylenol for pain control if necessary, you can take these as directed.  After hours doctor line:  [phone]      ----------------------------------------------------------------  Nurse Triage line:  [phone]   Call this number with any questions or concerns. You may leave a detailed message anytime. Calls are typically returned Monday through Friday between 8 AM and 4:30 PM. We usually get back to you within 2 business days depending on the issue/request.       Medication refills:    For non-narcotic medications, call your pharmacy directly to request a refill. The pharmacy will contact the [facility] for authorization. Please allow 3-4 days for these refills to be processed.     For narcotic refills, call the nurse triage line or send a [employer] message. Please contact us 7-10 days before your refill is due. The message MUST include the name of the specific medication(s)  requested and how you would like to receive the prescription(s). The options are as follows:    Pain Clinic staff can mail the prescription to your pharmacy. Please tell us the name of the pharmacy.    You may pick the prescription up at the Pain Clinic (tell us the location) or during a clinic visit with your pain provider    Pain Clinic staff can deliver the prescription to the [place] pharmacy in the clinic building. Please tell us the location.      Scheduling number: [phone].  Call this number to schedule or change appointments.    We believe regular attendance is key to your success in our program.    Any time you are unable to keep your appointment we ask that you call us at least 24 hours in advance to let us know. This will allow us to offer the appointment time to another patient.

## 2018-08-14 ENCOUNTER — MYC MEDICAL ADVICE (OUTPATIENT)
Dept: PEDIATRICS | Facility: CLINIC | Age: 43
End: 2018-08-14

## 2018-08-14 DIAGNOSIS — F41.0 PANIC ATTACK: ICD-10-CM

## 2018-08-14 DIAGNOSIS — F41.1 GAD (GENERALIZED ANXIETY DISORDER): ICD-10-CM

## 2018-08-14 NOTE — TELEPHONE ENCOUNTER
"Refill request for: CLONAZEPAM 1 MG - Weaning down  Fax RX to [facility].  Please reply to this [employer] message when faxed.     Last rx written: 7/14/18 # 89 w/ 0 refills    **[place]  reviewed- last filled: 7/12 #89, 6/15 #105  Last OV: 7/5/18  for routine exam      Weaning Instructions Per 7/16/18 Play[employer] message/narcotic agreement:    \"Month 1 - 47 tabs first two weeks (avg 3.3 tabs/day), 42 tabs second two weeks (avg 3 tabs/day) = 89 tabs   Month 2 - 38 tabs first two weeks (avg 2.7 tabs/day), 34 tabs second two weeks (avg 2.4 tabs/day) = 72 tabs   Month 3 - 31 tabs first two weeks (avg 2.2 tabs/day), 28 tabs second two weeks (avg 2 tabs/day) = 59 tabs   Month 4 - 25 tabs first two weeks (avg 1.8 tabs/day), 23 tabs second two weeks (avg 1.6 tabs/day) = 48 tabs   Month 5 - 21 tabs first two weeks (avg 1.5 tabs/day), 19 tabs second two weeks (avg 1.3 tabs/day) = 40 tabs   Month 6 - 17 tabs first two weeks (avg 1.2 tabs/day), 15 tabs second two weeks (avg 1 tabs/day) = 32 tabs \"    "

## 2018-08-15 RX ORDER — CLONAZEPAM 1 MG/1
TABLET ORAL
Qty: 72 TABLET | Refills: 0 | Status: SHIPPED | OUTPATIENT
Start: 2018-08-15 | End: 2018-09-12

## 2018-08-15 NOTE — TELEPHONE ENCOUNTER
Continuing to wean.      checked.     Printed, signed, in my COMPLETE box.     LINCOLN Mcfadden MD  Internal Medicine-Pediatrics

## 2018-08-27 ENCOUNTER — OFFICE VISIT (OUTPATIENT)
Dept: PEDIATRICS | Facility: CLINIC | Age: 43
End: 2018-08-27
Payer: COMMERCIAL

## 2018-08-27 VITALS
DIASTOLIC BLOOD PRESSURE: 70 MMHG | HEART RATE: 77 BPM | WEIGHT: 200.9 LBS | SYSTOLIC BLOOD PRESSURE: 106 MMHG | OXYGEN SATURATION: 97 % | HEIGHT: 71 IN | TEMPERATURE: 98.3 F | BODY MASS INDEX: 28.13 KG/M2

## 2018-08-27 DIAGNOSIS — Z79.899 ENCOUNTER FOR LONG-TERM (CURRENT) USE OF HIGH-RISK MEDICATION: ICD-10-CM

## 2018-08-27 DIAGNOSIS — F41.1 GAD (GENERALIZED ANXIETY DISORDER): Primary | ICD-10-CM

## 2018-08-27 PROCEDURE — 99213 OFFICE O/P EST LOW 20 MIN: CPT | Performed by: INTERNAL MEDICINE

## 2018-08-27 ASSESSMENT — ANXIETY QUESTIONNAIRES
IF YOU CHECKED OFF ANY PROBLEMS ON THIS QUESTIONNAIRE, HOW DIFFICULT HAVE THESE PROBLEMS MADE IT FOR YOU TO DO YOUR WORK, TAKE CARE OF THINGS AT HOME, OR GET ALONG WITH OTHER PEOPLE: SOMEWHAT DIFFICULT
2. NOT BEING ABLE TO STOP OR CONTROL WORRYING: SEVERAL DAYS
7. FEELING AFRAID AS IF SOMETHING AWFUL MIGHT HAPPEN: NOT AT ALL
GAD7 TOTAL SCORE: 13
5. BEING SO RESTLESS THAT IT IS HARD TO SIT STILL: NEARLY EVERY DAY
3. WORRYING TOO MUCH ABOUT DIFFERENT THINGS: SEVERAL DAYS
6. BECOMING EASILY ANNOYED OR IRRITABLE: MORE THAN HALF THE DAYS
1. FEELING NERVOUS, ANXIOUS, OR ON EDGE: NEARLY EVERY DAY

## 2018-08-27 ASSESSMENT — PATIENT HEALTH QUESTIONNAIRE - PHQ9: 5. POOR APPETITE OR OVEREATING: NEARLY EVERY DAY

## 2018-08-27 NOTE — PROGRESS NOTES
"  SUBJECTIVE:   Diego Meng is a 42 year old male who presents to clinic today for the following health issues:    Patient reports things are going good with tapering of clonazepam, but feels like the anxiety is climbing, tightness in chest, and feeling anxious.     --------------    No updates on back. Pain still the same.   Was supposed to start physical therapy for back at [facility] but hasn't yet.    Left meniscus torn and was repaired.   Right knee acting the same way - seeing Ortho Thursday. Would want to do knee before back.     Pain psychology  Not depressed, coming off high dose of opioids, adjusting   Brain is changing  Works on dealing with theconstant pain.  Has sessions every month    Taper \"actually going okay.\" Gets nervous thinking about the next step. Relies on it as his \"silver bullet\", dayron for work presentations.     Problem list and histories reviewed & adjusted, as indicated.  Additional history: as documented    Patient Active Problem List   Diagnosis     Postoperative back pain     Intractable back pain     Continuous opioid dependence (H)     Panic attack     BRIDGER (generalized anxiety disorder)     Hyperlipidemia LDL goal <160     Chronic pain syndrome     Encounter for long-term (current) use of high-risk medication     Severe episode of recurrent major depressive disorder, without psychotic features (H)     Myofascial pain     Tobacco use disorder     Past Surgical History:   Procedure Laterality Date     ARTHROSCOPY KNEE WITH MEDIAL MENISCECTOMY Left 11/28/2016    Procedure: ARTHROSCOPY KNEE WITH MEDIAL MENISCECTOMY;  Surgeon: Geovani Kaufman MD;  Location: [facility] OR     BACK SURGERY  5/2015    laminectomy, discectomy L4-L5       Social History   Substance Use Topics     Smoking status: Former Smoker     Packs/day: 1.00     Years: 20.00     Types: Cigarettes     Quit date: 7/27/2018     Smokeless tobacco: Never Used     Alcohol use No     Family History   Problem Relation " "Age of Onset     Diabetes Father      Diabetes Paternal Grandfather      Hyperlipidemia Sister      Coronary Artery Disease No family hx of      Colon Cancer No family hx of      Prostate Cancer No family hx of          Current Outpatient Prescriptions   Medication Sig Dispense Refill     atorvastatin (LIPITOR) 40 MG tablet Take 1 tablet (40 mg) by mouth daily 90 tablet 3     buprenorphine HCl-naloxone HCl (SUBOXONE) 8-2 MG per film Place 1 Film under the tongue every 8 hours 90 Film 2     clonazePAM (KLONOPIN) 1 MG tablet 38 tabs first two weeks (avg 2.7 tabs/day), 34 tabs second two weeks (avg 2.4 tabs/day) 72 tablet 0     escitalopram (LEXAPRO) 20 MG tablet Take 1 tablet (20 mg) by mouth daily 90 tablet 3     esomeprazole (NEXIUM) 20 MG capsule Take 1 capsule (20 mg) by mouth every morning (before breakfast) Take 30-60 minutes before eating. 30 capsule 0     Methocarbamol (ROBAXIN PO)        omega 3 1000 MG CAPS Take 1 g by mouth daily 90 capsule      naloxone (NARCAN) nasal spray Spray 1 spray (4 mg) into one nostril alternating nostrils as needed for opioid reversal every 2-3 minutes until assistance arrives (Patient not taking: Reported on 7/5/2018) 0.2 mL 0     Allergies   Allergen Reactions     Nsaids Rash       Reviewed and updated as needed this visit by clinical staff       Reviewed and updated as needed this visit by Provider         ROS:  Constitutional, HEENT, cardiovascular, pulmonary, gi and gu systems are negative, except as otherwise noted.    OBJECTIVE:     /70  Pulse 77  Temp 98.3  F (36.8  C) (Oral)  Ht 5' 10.5\" (1.791 m)  Wt 200 lb 14.4 oz (91.1 kg)  SpO2 97%  BMI 28.42 kg/m2  Body mass index is 28.42 kg/(m^2).  GENERAL: healthy, alert and no distress  RESP: lungs clear to auscultation - no rales, rhonchi or wheezes  CV: regular rate and rhythm, normal S1 S2, no S3 or S4, no murmur, click or rub, no peripheral edema and peripheral pulses strong  MS: no gross musculoskeletal defects " noted, no edema  PSYCH: mentation appears normal, affect normal/bright    Diagnostic Test Results:  none     ASSESSMENT/PLAN:       ICD-10-CM    1. BRIDGER (generalized anxiety disorder) F41.1    2. Encounter for long-term (current) use of high-risk medication Z79.899      Doing well on taper.   Discussed going one day at a time and not focusing on taper in the future.   Could consider propranolol for performance anxiety in the future - he's going to watch when he is using his clonazepam.   Continue with pain psychology.    F/u 3 months.     Patient Instructions   Nice to see you!    Keep pushing through - brain chemistry takes a while to reset.     3 months for pain/anxiety  January 2019 for Wellness/Physical     Call with any questions - I'll keep my eye out for notes from Dr. Vallecillo if things change.    Yogesh Mcfadden MD  [facility]

## 2018-08-27 NOTE — MR AVS SNAPSHOT
After Visit Summary   8/27/2018    Diego Meng    MRN: [id number]           Patient Information     Date Of Birth          1975        Visit Information        Provider Department      8/27/2018 7:50 AM Yogesh Mcfadden MD [facility]        Care Instructions    Nice to see you!    Keep pushing through - brain chemistry takes a while to reset.     3 months for pain/anxiety  January 2019 for Wellness/Physical     Call with any questions - I'll keep my eye out for notes from Dr. Vallecillo if things change.          Follow-ups after your visit        Follow-up notes from your care team     Return in about 3 months (around 11/27/2018) for Mental Health Follow Up.      Your next 10 appointments already scheduled     Aug 30, 2018  8:30 AM CDT   [facility] Surgery New with Geovani Kaufman MD   [facility]THOPEDIC SURGERY ([facility])    [id number] [place] [address]   [phone]            Nov 08, 2018  8:00 AM CST   Return Visit with Reba Vallecillo MD   [facility] ([facility])    [address] [place] [address]   [phone]              Who to contact     If you have questions or need follow up information about today's clinic visit or your schedule please contact [facility] directly at [phone].  Normal or non-critical lab and imaging results will be communicated to you by MyChart, letter or phone within 4 business days after the clinic has received the results. If you do not hear from us within 7 days, please contact the clinic through [employer] or phone. If you have a critical or abnormal lab result, we will notify you by phone as soon as possible.  Submit refill requests through [employer] or call your pharmacy and they will forward the refill request to us. Please allow 3 business days for your refill to be completed.           "Additional Information About Your Visit        MyChart Information     [employer] gives you secure access to your electronic health record. If you see a primary care provider, you can also send messages to your care team and make appointments. If you have questions, please call your primary care clinic.  If you do not have a primary care provider, please call [phone] and they will assist you.        Care EveryWhere ID     This is your Care EveryWhere ID. This could be used by other organizations to access your [place] medical records  [id number]        Your Vitals Were     Pulse Temperature Height Pulse Oximetry BMI (Body Mass Index)       77 98.3  F (36.8  C) (Oral) 5' 10.5\" (1.791 m) 97% 28.42 kg/m2        Blood Pressure from Last 3 Encounters:   08/27/18 106/70   07/27/18 118/77   07/05/18 120/64    Weight from Last 3 Encounters:   08/27/18 200 lb 14.4 oz (91.1 kg)   07/05/18 207 lb 3.2 oz (94 kg)   01/12/18 211 lb (95.7 kg)              Today, you had the following     No orders found for display       Primary Care Provider Office Phone # Fax #    Yogesh Mcfadden [phone]       [facility] [address]        Equal Access to Services     [facility] TIM : Hadii aad ku hadasho Soomaali, waaxda luqadaha, qaybta kaalmada adeegyada, waxneida idiin hayaan maurice alexander . So [facility] [phone].    ATENCIÓN: Si habla español, tiene a yanez disposición servicios gratuitos de asistencia lingüística. Llame al [phone].    We comply with applicable federal civil rights laws and [place] laws. We do not discriminate on the basis of race, color, national origin, age, disability, sex, sexual orientation, or gender identity.            Thank you!     Thank you for choosing [facility]  for your care. Our goal is always to provide you with excellent care. Hearing back from our patients is one way we can continue to improve our services. Please take a few minutes " to complete the written survey that you may receive in the mail after your visit with us. Thank you!             Your Updated Medication List - Protect others around you: Learn how to safely use, store and throw away your medicines at www.disposemymeds.org.          This list is accurate as of 8/27/18  8:30 AM.  Always use your most recent med list.                   Brand Name Dispense Instructions for use Diagnosis    atorvastatin 40 MG tablet    LIPITOR    90 tablet    Take 1 tablet (40 mg) by mouth daily    Hyperlipidemia LDL goal <160       buprenorphine HCl-naloxone HCl 8-2 MG per film    SUBOXONE    90 Film    Place 1 Film under the tongue every 8 hours    Chronic pain syndrome, Continuous opioid dependence (H)       clonazePAM 1 MG tablet    klonoPIN    72 tablet    38 tabs first two weeks (avg 2.7 tabs/day), 34 tabs second two weeks (avg 2.4 tabs/day)    Panic attack, BRIDGER (generalized anxiety disorder)       escitalopram 20 MG tablet    LEXAPRO    90 tablet    Take 1 tablet (20 mg) by mouth daily    BRIDGER (generalized anxiety disorder)       esomeprazole 20 MG CR capsule    nexIUM    30 capsule    Take 1 capsule (20 mg) by mouth every morning (before breakfast) Take 30-60 minutes before eating.        naloxone nasal spray    NARCAN    0.2 mL    Spray 1 spray (4 mg) into one nostril alternating nostrils as needed for opioid reversal every 2-3 minutes until assistance arrives    Bilateral thoracic back pain, unspecified chronicity       omega 3 1000 MG Caps     90 capsule    Take 1 g by mouth daily        ROBAXIN PO

## 2018-08-27 NOTE — PATIENT INSTRUCTIONS
Nice to see you!    Keep pushing through - brain chemistry takes a while to reset.     3 months for pain/anxiety  January 2019 for Wellness/Physical     Call with any questions - I'll keep my eye out for notes from Dr. Vallecillo if things change.

## 2018-08-28 ASSESSMENT — PATIENT HEALTH QUESTIONNAIRE - PHQ9: SUM OF ALL RESPONSES TO PHQ QUESTIONS 1-9: 5

## 2018-08-28 ASSESSMENT — ANXIETY QUESTIONNAIRES: GAD7 TOTAL SCORE: 13

## 2018-08-30 ENCOUNTER — OFFICE VISIT (OUTPATIENT)
Dept: ORTHOPEDICS | Facility: CLINIC | Age: 43
End: 2018-08-30
Payer: COMMERCIAL

## 2018-08-30 ENCOUNTER — MYC MEDICAL ADVICE (OUTPATIENT)
Dept: ADDICTION MEDICINE | Facility: CLINIC | Age: 43
End: 2018-08-30

## 2018-08-30 ENCOUNTER — TELEPHONE (OUTPATIENT)
Dept: ORTHOPEDICS | Facility: CLINIC | Age: 43
End: 2018-08-30

## 2018-08-30 ENCOUNTER — RADIANT APPOINTMENT (OUTPATIENT)
Dept: GENERAL RADIOLOGY | Facility: CLINIC | Age: 43
End: 2018-08-30
Attending: ORTHOPAEDIC SURGERY
Payer: COMMERCIAL

## 2018-08-30 ENCOUNTER — MEDICAL CORRESPONDENCE (OUTPATIENT)
Dept: HEALTH INFORMATION MANAGEMENT | Facility: CLINIC | Age: 43
End: 2018-08-30

## 2018-08-30 VITALS — DIASTOLIC BLOOD PRESSURE: 68 MMHG | WEIGHT: 200 LBS | SYSTOLIC BLOOD PRESSURE: 108 MMHG | BODY MASS INDEX: 28.29 KG/M2

## 2018-08-30 DIAGNOSIS — M25.561 ACUTE PAIN OF RIGHT KNEE: Primary | ICD-10-CM

## 2018-08-30 DIAGNOSIS — M25.561 ACUTE PAIN OF RIGHT KNEE: ICD-10-CM

## 2018-08-30 PROCEDURE — 99214 OFFICE O/P EST MOD 30 MIN: CPT | Performed by: ORTHOPAEDIC SURGERY

## 2018-08-30 PROCEDURE — 73562 X-RAY EXAM OF KNEE 3: CPT | Mod: RT

## 2018-08-30 NOTE — PROGRESS NOTES
HISTORY OF PRESENT ILLNESS:    Diego Meng is a 42 year old male who is seen as self referral for right knee pain. Patient has previously seen Dr. Kaufman for left knee arthroscopy, 11/28/16.  Patient states his knee has been bothering him for a while, but 1 week ago his knee gave out on him and has had severe knee pain.  He notes the left knee is doing well, slightly achy, but no complaints.   Present symptoms:    Patient notes right medial knee pain.  Mild swelling of the right knee.  Pain is sharp and stabbing, and is constant.  Current pain level: 8/10.  Patient states the knee feels weak.  Increased pain with squatting, moving from sit to stand, standing and walking for extended periods of time. Increased pain from weightbearing causes limping.  Treatments tried to this point: Ice, elevation, rest, Suboxone for chronic back pain.    Orthopedic PMH: Left Knee arthroscopy (11/28/16)    Past Medical History:   Diagnosis Date     Anxiety      Chronic radicular lumbar pain      Opioid dependence (H)      Other chronic pain        Past Surgical History:   Procedure Laterality Date     ARTHROSCOPY KNEE WITH MEDIAL MENISCECTOMY Left 11/28/2016    Procedure: ARTHROSCOPY KNEE WITH MEDIAL MENISCECTOMY;  Surgeon: Geovani Kaufman MD;  Location: [facility] OR     BACK SURGERY  5/2015    laminectomy, discectomy L4-L5       Family History   Problem Relation Age of Onset     Diabetes Father      Diabetes Paternal Grandfather      Hyperlipidemia Sister      Coronary Artery Disease No family hx of      Colon Cancer No family hx of      Prostate Cancer No family hx of        Social History     Social History     Marital status:      Spouse name: N/A     Number of children: N/A     Years of education: N/A     Occupational History     Not on file.     Social History Main Topics     Smoking status: Former Smoker     Packs/day: 1.00     Years: 20.00     Types: Cigarettes     Quit date: 7/27/2018     Smokeless tobacco:  Never Used     Alcohol use No     Drug use: No     Sexual activity: Yes     Other Topics Concern     Parent/Sibling W/ Cabg, Mi Or Angioplasty Before 65f 55m? No     Social History Narrative       Current Outpatient Prescriptions   Medication Sig Dispense Refill     atorvastatin (LIPITOR) 40 MG tablet Take 1 tablet (40 mg) by mouth daily 90 tablet 3     buprenorphine HCl-naloxone HCl (SUBOXONE) 8-2 MG per film Place 1 Film under the tongue every 8 hours 90 Film 2     clonazePAM (KLONOPIN) 1 MG tablet 38 tabs first two weeks (avg 2.7 tabs/day), 34 tabs second two weeks (avg 2.4 tabs/day) 72 tablet 0     escitalopram (LEXAPRO) 20 MG tablet Take 1 tablet (20 mg) by mouth daily 90 tablet 3     esomeprazole (NEXIUM) 20 MG capsule Take 1 capsule (20 mg) by mouth every morning (before breakfast) Take 30-60 minutes before eating. 30 capsule 0     Methocarbamol (ROBAXIN PO)        naloxone (NARCAN) nasal spray Spray 1 spray (4 mg) into one nostril alternating nostrils as needed for opioid reversal every 2-3 minutes until assistance arrives (Patient not taking: Reported on 7/5/2018) 0.2 mL 0     omega 3 1000 MG CAPS Take 1 g by mouth daily 90 capsule        Allergies   Allergen Reactions     Nsaids Rash       REVIEW OF SYSTEMS:  CONSTITUTIONAL:  NEGATIVE for fever, chills, change in weight  INTEGUMENTARY/SKIN:  NEGATIVE for worrisome rashes, moles or lesions  EYES:  NEGATIVE for vision changes or irritation  ENT/MOUTH:  NEGATIVE for ear, mouth and throat problems  RESP:  NEGATIVE for significant cough or SOB  BREAST:  NEGATIVE for masses, tenderness or discharge  CV:  NEGATIVE for chest pain, palpitations or peripheral edema  GI:  NEGATIVE for nausea, abdominal pain, heartburn, or change in bowel habits  :  Negative   MUSCULOSKELETAL:  See HPI above  NEURO:  NEGATIVE for weakness, dizziness or paresthesias  ENDOCRINE:  NEGATIVE for temperature intolerance, skin/hair changes  HEME/ALLERGY/IMMUNE:  NEGATIVE for bleeding  problems  PSYCHIATRIC:  NEGATIVE for changes in mood or affect      PHYSICAL EXAM:  There were no vitals taken for this visit.  There is no height or weight on file to calculate BMI.   GENERAL APPEARANCE: healthy, alert and no distress   SKIN: no suspicious lesions or rashes  NEURO: Normal strength and tone, mentation intact and speech normal  VASCULAR: Good pulses, and capillary refill   LYMPH: no lymphadenopathy   PSYCH:  mentation appears normal and affect normal/bright    MSK:  A&OX3, NAD  Neck supple, no lymphadenopathy  The patient ambulates without an antalgic gait.  The patient is able to get on and off the exam table without difficulty.      Examination of the spine reveals a normal lordosis to the cervical and lumbar spine, and a normal kyphosis to the thoracic spine.  There is no clinical evidence of scoliosis.    The pelvis is clinically level.  Trendelenberg is negative.  The patient is non-tender to palpation over the greater trochanteric bursal region or piriformis fossa.  With the hip flexed to 90 degrees, internal and external rotation is 30/60 respectively,  with no pain .      KNEE: Examination of the right knee reveals the lower extremity to have a Normal anatomic alignment.  There is no erythema, ecchymosis nor edema.  There is minimal effusion present clinically.  There is no significant warmth.  Range of motion is 0 to 125 degrees, without crepitus.  There is moderate tenderness to palpation at the medial joint line.  Kamar's is positive without crepitus. The knee is ligamentously stable. Patello-femoral grind test is positive.      The calves and thighs are symmetric, without atrophy and non-tender to palpation. Francisca's sign is negative, bilaterally.   CMS is intact to the toes.        ASSESSMENT / PLAN: Medial meniscus tear right knee.  I offered him an arthroscopic examination.  He will schedule this at his convenience.    Imaging Interpretation:         Kalin Kaufman MD  Department of  Orthopedic Surgery

## 2018-08-30 NOTE — MR AVS SNAPSHOT
After Visit Summary   8/30/2018    Diego Meng    MRN: [id number]           Patient Information     Date Of Birth          1975        Visit Information        Provider Department      8/30/2018 8:30 AM Geovani Kaufman MD [facility] ORTHOPEDIC SURGERY        Today's Diagnoses     Acute pain of right knee    -  1      Care Instructions    Patient will be scheduled for right knee arthroscope.           Follow-ups after your visit        Your next 10 appointments already scheduled     Sep 17, 2018  2:40 PM CDT   Return Visit with Kalyan Smith PA-C   [facility]THOPEDIC [facility]GERY ([facility])    [address] [facility]  [address]  [facility] [address]   [phone]            Nov 08, 2018  8:00 AM CST   Return Visit with Reba Vallecillo MD   [facility] ([facility])    [address] [address] [address]   [phone]              Who to contact     If you have questions or need follow up information about today's clinic visit or your schedule please contact [facility] ORTHOPEDIC SURGERY directly at [phone].  Normal or non-critical lab and imaging results will be communicated to you by Quintileshart, letter or phone within 4 business days after the clinic has received the results. If you do not hear from us within 7 days, please contact the clinic through Quintileshart or phone. If you have a critical or abnormal lab result, we will notify you by phone as soon as possible.  Submit refill requests through [employer] or call your pharmacy and they will forward the refill request to us. Please allow 3 business days for your refill to be completed.          Additional Information About Your Visit        MyChart Information     [employer] gives you secure access to your electronic health record. If you see a primary care provider, you can also send messages to your care team and make appointments. If  you have questions, please call your primary care clinic.  If you do not have a primary care provider, please call [phone] and they will assist you.        Care EveryWhere ID     This is your Care EveryWhere ID. This could be used by other organizations to access your [place] medical records  [id number]        Your Vitals Were     BMI (Body Mass Index)                   28.29 kg/m2            Blood Pressure from Last 3 Encounters:   09/06/18 114/76   08/30/18 108/68   08/27/18 106/70    Weight from Last 3 Encounters:   09/06/18 203 lb 9.6 oz (92.4 kg)   08/30/18 200 lb (90.7 kg)   08/27/18 200 lb 14.4 oz (91.1 kg)              We Performed the Following     Cait-Operative Worksheet        Primary Care Provider Office Phone # Fax #    Yogesh Mcfadden [phone] [address] [facility] [address]        Equal Access to Services     MATHEW DUMONT : Hadii aad ku hadasho Soomaali, waaxda luqadaha, qaybta kaalmada adeegyada, waxay idiin haybraydenn maurice alexander . So [facility] [phone].    ATENCIÓN: Si habla español, tiene a yanez disposición servicios gratuitos de asistencia lingüística. Huey al [phone].    We comply with applicable federal civil rights laws and [place] laws. We do not discriminate on the basis of race, color, national origin, age, disability, sex, sexual orientation, or gender identity.            Thank you!     Thank you for choosing [facility] ORTHOPEDIC SURGERY  for your care. Our goal is always to provide you with excellent care. Hearing back from our patients is one way we can continue to improve our services. Please take a few minutes to complete the written survey that you may receive in the mail after your visit with us. Thank you!             Your Updated Medication List - Protect others around you: Learn how to safely use, store and throw away your medicines at www.disposemymeds.org.          This list is accurate as of 8/30/18 11:59  PM.  Always use your most recent med list.                   Brand Name Dispense Instructions for use Diagnosis    atorvastatin 40 MG tablet    LIPITOR    90 tablet    Take 1 tablet (40 mg) by mouth daily    Hyperlipidemia LDL goal <160       buprenorphine HCl-naloxone HCl 8-2 MG per film    SUBOXONE    90 Film    Place 1 Film under the tongue every 8 hours    Chronic pain syndrome, Continuous opioid dependence (H)       clonazePAM 1 MG tablet    klonoPIN    72 tablet    38 tabs first two weeks (avg 2.7 tabs/day), 34 tabs second two weeks (avg 2.4 tabs/day)    Panic attack, BRIDGER (generalized anxiety disorder)       escitalopram 20 MG tablet    LEXAPRO    90 tablet    Take 1 tablet (20 mg) by mouth daily    BRIDGER (generalized anxiety disorder)       esomeprazole 20 MG CR capsule    nexIUM    30 capsule    Take 1 capsule (20 mg) by mouth every morning (before breakfast) Take 30-60 minutes before eating.        omega 3 1000 MG Caps     90 capsule    Take 1 g by mouth daily        ROBAXIN PO

## 2018-08-30 NOTE — LETTER
8/30/2018         RE: Diego Meng  [address]  [facility] [address]        Dear Colleague,    Thank you for referring your patient, Diego Meng, to the [facility] ORTHOPEDIC SURGERY. Please see a copy of my visit note below.    HISTORY OF PRESENT ILLNESS:    Diego Meng is a 42 year old male who is seen as self referral for right knee pain. Patient has previously seen Dr. Kaufman for left knee arthroscopy, 11/28/16.  Patient states his knee has been bothering him for a while, but 1 week ago his knee gave out on him and has had severe knee pain.  He notes the left knee is doing well, slightly achy, but no complaints.   Present symptoms:    Patient notes right medial knee pain.  Mild swelling of the right knee.  Pain is sharp and stabbing, and is constant.  Current pain level: 8/10.  Patient states the knee feels weak.  Increased pain with squatting, moving from sit to stand, standing and walking for extended periods of time. Increased pain from weightbearing causes limping.  Treatments tried to this point: Ice, elevation, rest, Suboxone for chronic back pain.    Orthopedic PMH: Left Knee arthroscopy (11/28/16)    Past Medical History:   Diagnosis Date     Anxiety      Chronic radicular lumbar pain      Opioid dependence (H)      Other chronic pain        Past Surgical History:   Procedure Laterality Date     ARTHROSCOPY KNEE WITH MEDIAL MENISCECTOMY Left 11/28/2016    Procedure: ARTHROSCOPY KNEE WITH MEDIAL MENISCECTOMY;  Surgeon: Geovani Kaufman MD;  Location: [facility] OR     BACK SURGERY  5/2015    laminectomy, discectomy L4-L5       Family History   Problem Relation Age of Onset     Diabetes Father      Diabetes Paternal Grandfather      Hyperlipidemia Sister      Coronary Artery Disease No family hx of      Colon Cancer No family hx of      Prostate Cancer No family hx of        Social History     Social History     Marital status:      Spouse name: N/A      Number of children: N/A     Years of education: N/A     Occupational History     Not on file.     Social History Main Topics     Smoking status: Former Smoker     Packs/day: 1.00     Years: 20.00     Types: Cigarettes     Quit date: 7/27/2018     Smokeless tobacco: Never Used     Alcohol use No     Drug use: No     Sexual activity: Yes     Other Topics Concern     Parent/Sibling W/ Cabg, Mi Or Angioplasty Before 65f 55m? No     Social History Narrative       Current Outpatient Prescriptions   Medication Sig Dispense Refill     atorvastatin (LIPITOR) 40 MG tablet Take 1 tablet (40 mg) by mouth daily 90 tablet 3     buprenorphine HCl-naloxone HCl (SUBOXONE) 8-2 MG per film Place 1 Film under the tongue every 8 hours 90 Film 2     clonazePAM (KLONOPIN) 1 MG tablet 38 tabs first two weeks (avg 2.7 tabs/day), 34 tabs second two weeks (avg 2.4 tabs/day) 72 tablet 0     escitalopram (LEXAPRO) 20 MG tablet Take 1 tablet (20 mg) by mouth daily 90 tablet 3     esomeprazole (NEXIUM) 20 MG capsule Take 1 capsule (20 mg) by mouth every morning (before breakfast) Take 30-60 minutes before eating. 30 capsule 0     Methocarbamol (ROBAXIN PO)        naloxone (NARCAN) nasal spray Spray 1 spray (4 mg) into one nostril alternating nostrils as needed for opioid reversal every 2-3 minutes until assistance arrives (Patient not taking: Reported on 7/5/2018) 0.2 mL 0     omega 3 1000 MG CAPS Take 1 g by mouth daily 90 capsule        Allergies   Allergen Reactions     Nsaids Rash       REVIEW OF SYSTEMS:  CONSTITUTIONAL:  NEGATIVE for fever, chills, change in weight  INTEGUMENTARY/SKIN:  NEGATIVE for worrisome rashes, moles or lesions  EYES:  NEGATIVE for vision changes or irritation  ENT/MOUTH:  NEGATIVE for ear, mouth and throat problems  RESP:  NEGATIVE for significant cough or SOB  BREAST:  NEGATIVE for masses, tenderness or discharge  CV:  NEGATIVE for chest pain, palpitations or peripheral edema  GI:  NEGATIVE for nausea, abdominal  pain, heartburn, or change in bowel habits  :  Negative   MUSCULOSKELETAL:  See HPI above  NEURO:  NEGATIVE for weakness, dizziness or paresthesias  ENDOCRINE:  NEGATIVE for temperature intolerance, skin/hair changes  HEME/ALLERGY/IMMUNE:  NEGATIVE for bleeding problems  PSYCHIATRIC:  NEGATIVE for changes in mood or affect      PHYSICAL EXAM:  There were no vitals taken for this visit.  There is no height or weight on file to calculate BMI.   GENERAL APPEARANCE: healthy, alert and no distress   SKIN: no suspicious lesions or rashes  NEURO: Normal strength and tone, mentation intact and speech normal  VASCULAR: Good pulses, and capillary refill   LYMPH: no lymphadenopathy   PSYCH:  mentation appears normal and affect normal/bright    MSK:  A&OX3, NAD  Neck supple, no lymphadenopathy  The patient ambulates without an antalgic gait.  The patient is able to get on and off the exam table without difficulty.      Examination of the spine reveals a normal lordosis to the cervical and lumbar spine, and a normal kyphosis to the thoracic spine.  There is no clinical evidence of scoliosis.    The pelvis is clinically level.  Trendelenberg is negative.  The patient is non-tender to palpation over the greater trochanteric bursal region or piriformis fossa.  With the hip flexed to 90 degrees, internal and external rotation is 30/60 respectively,  with no pain .      KNEE: Examination of the right knee reveals the lower extremity to have a Normal anatomic alignment.  There is no erythema, ecchymosis nor edema.  There is minimal effusion present clinically.  There is no significant warmth.  Range of motion is 0 to 125 degrees, without crepitus.  There is moderate tenderness to palpation at the medial joint line.  Kamar's is positive without crepitus. The knee is ligamentously stable. Patello-femoral grind test is positive.      The calves and thighs are symmetric, without atrophy and non-tender to palpation. Francisca's sign is  negative, bilaterally.   CMS is intact to the toes.        ASSESSMENT / PLAN: Medial meniscus tear right knee.  I offered him an arthroscopic examination.  He will schedule this at his convenience.    Imaging Interpretation:         Kalin Kaufman MD  Department of Orthopedic Surgery        Again, thank you for allowing me to participate in the care of your patient.        Sincerely,        Geovani Kaufman MD

## 2018-08-30 NOTE — TELEPHONE ENCOUNTER
My chart sent from patient:    Greetings, I have been having an issue with my right knee that had gone from bad to worse. It s a torn meniscus in my right knee confirmed by Dr Mandeep jewell.  I contacted my primary to see if there s something we can do about the added pain until I have surgery on September 12.  Same question for you what would you recommend I do for the added pain?     Also, would we want to follow the same plan for stopping the suboxone 1 week before sugery and switch to meds that are less likely to block the pain management of the surgery (as we did discussed regarding my upcoming spine surgery) ?     Please let me know...   Thanks!   Diego MARCN-RN Care Coordinator  [facility]

## 2018-08-30 NOTE — TELEPHONE ENCOUNTER
Scheduled surgery after clinic visit.     Type of surgery: Right Knee arthroscopy  Location of surgery: Other: ASC  Date and time of surgery: 9/12/18 @ 0830   Surgeon: Mandeep  Pre-Op Appt Date: 9/6/18  Post-Op Appt Date: 9/20/18   Packet sent out: Yes  Pre-cert/Authorization completed:  Not Applicable  Date: 8/30/18

## 2018-08-31 NOTE — TELEPHONE ENCOUNTER
Kelly sent to the patient:     Diego,     There is no need to stop your Suboxone prior to a knee scope.  For outpatient surgery we do not take people off.  The anesthesiologist will use regional anesthesia to help with post op pain. I would recommend that you continue your suboxone and take whatever supplemental medications Dr. Kaufman gives you for post op pain in addition to your suboxone.  As far as giving you meds prior to your surgery this would be something you would need to discuss with Dr. Kaufman as he saw you for this problem and can make a decision as to whether or not this is a condition which necessitates opioids.       Reba Vallecillo MD

## 2018-09-04 ENCOUNTER — MYC MEDICAL ADVICE (OUTPATIENT)
Dept: ORTHOPEDICS | Facility: CLINIC | Age: 43
End: 2018-09-04

## 2018-09-04 DIAGNOSIS — M25.561 ACUTE PAIN OF RIGHT KNEE: Primary | ICD-10-CM

## 2018-09-04 NOTE — TELEPHONE ENCOUNTER
My chart sent from patient:    Da, I am going to have surgery to repair a torn meniscus in my right knee a week from Wednesday (September 12).  Last time we spoke we talked about a plan for my upcoming spine surgery; to drop the suboxone a week prior, so the pain meds will work properly at the time of surgery... do we want to do the same thing for this upcoming knee surgery as they always have a hell of time getting my pain under control post op and about that first week after.     Please let me know if Dr Vallecillo can manage this as she knows my situation better than anyone.     Also, the suboxone isn t working on the knee pain at the moment... so I m having a really rough time.     Thanks!!   Diego MARCN-RN Care Coordinator  [facility]

## 2018-09-04 NOTE — TELEPHONE ENCOUNTER
My chart sent to patient:    Diego,  Dr. Vallecillo sent you this message last week:     Diego,     There is no need to stop your Suboxone prior to a knee scope.  For outpatient surgery we do not take people off.  The anesthesiologist will use regional anesthesia to help with post op pain. I would recommend that you continue your suboxone and take whatever supplemental medications Dr. Kaufman gives you for post op pain in addition to your suboxone.  As far as giving you meds prior to your surgery this would be something you would need to discuss with Dr. Kaufman as he saw you for this problem and can make a decision as to whether or not this is a condition which necessitates opioids.       MD Tierra Dorsey   BSN-RN Care Coordinator  [facility]

## 2018-09-05 NOTE — TELEPHONE ENCOUNTER
Please see EVRGR message and discuss with Dr. Kaufman.   Patient having R knee scope on 9/12/18.     SERGIO Cazares RN

## 2018-09-06 ENCOUNTER — HOSPITAL ENCOUNTER (OUTPATIENT)
Dept: MRI IMAGING | Facility: CLINIC | Age: 43
Discharge: HOME | End: 2018-09-06
Attending: ORTHOPAEDIC SURGERY | Admitting: ORTHOPAEDIC SURGERY
Payer: COMMERCIAL

## 2018-09-06 ENCOUNTER — OFFICE VISIT (OUTPATIENT)
Dept: PEDIATRICS | Facility: CLINIC | Age: 43
End: 2018-09-06
Payer: COMMERCIAL

## 2018-09-06 VITALS
WEIGHT: 203.6 LBS | OXYGEN SATURATION: 98 % | DIASTOLIC BLOOD PRESSURE: 76 MMHG | SYSTOLIC BLOOD PRESSURE: 114 MMHG | BODY MASS INDEX: 28.5 KG/M2 | HEART RATE: 70 BPM | TEMPERATURE: 98.1 F | HEIGHT: 71 IN

## 2018-09-06 DIAGNOSIS — M25.561 ACUTE PAIN OF RIGHT KNEE: ICD-10-CM

## 2018-09-06 DIAGNOSIS — F11.20 CONTINUOUS OPIOID DEPENDENCE (H): ICD-10-CM

## 2018-09-06 DIAGNOSIS — Z01.818 PREOP GENERAL PHYSICAL EXAM: Primary | ICD-10-CM

## 2018-09-06 DIAGNOSIS — S83.241D TEAR OF MEDIAL MENISCUS OF RIGHT KNEE, CURRENT, UNSPECIFIED TEAR TYPE, SUBSEQUENT ENCOUNTER: ICD-10-CM

## 2018-09-06 PROCEDURE — 99215 OFFICE O/P EST HI 40 MIN: CPT | Performed by: INTERNAL MEDICINE

## 2018-09-06 PROCEDURE — 73721 MRI JNT OF LWR EXTRE W/O DYE: CPT | Mod: RT

## 2018-09-06 RX ORDER — LIDOCAINE 50 MG/G
OINTMENT TOPICAL PRN
Qty: 50 G | Refills: 1 | Status: SHIPPED | OUTPATIENT
Start: 2018-09-06 | End: 2020-07-08

## 2018-09-06 NOTE — Clinical Note
Hi Dr. Kaufman,   I saw Diego for a preop today. Dr. Vallecillo (Pain) wants him to continue his suboxone without changes. Please feel free to forward my preop to anesthesia if appropriate. We also helped schedule his MRI.   Thanks! LINCOLN Mcfadden MD Internal Medicine-Pediatrics

## 2018-09-06 NOTE — MR AVS SNAPSHOT
After Visit Summary   9/6/2018    Diego Meng    MRN: [id number]           Patient Information     Date Of Birth          1975        Visit Information        Provider Department      9/6/2018 7:50 AM Yogesh Mcfadden MD [facility]        Today's Diagnoses     Preop general physical exam    -  1    Tear of medial meniscus of right knee, current, unspecified tear type, subsequent encounter          Care Instructions    Let's work to get the MRI so we can figure out if this is meniscus or something else. As you said the pain is out of proportion for a meniscus.     For surgery - take all your meds.   Stop ibuprofen 24 hours before.     Before Your Surgery    Call your surgeon if there is any change in your health. This includes signs of a cold or flu (such as a sore throat, runny nose, cough, rash or fever).    Do not smoke, drink alcohol or take over the counter medicine (unless your surgeon or primary care doctor tells you to) for the 24 hours before and after surgery.    If you take prescribed drugs: Follow your doctor s orders about which medicines to take and which to stop until after surgery.    Eating and drinking prior to surgery: follow the instructions from your surgeon    Take a shower or bath the night before surgery. Use the soap your surgeon gave you to gently clean your skin. If you do not have soap from your surgeon, use your regular soap. Do not shave or scrub the surgery site.  Wear clean pajamas and have clean sheets on your bed.           Follow-ups after your visit        Follow-up notes from your care team     Return in about 10 weeks (around 11/15/2018) for Follow Up.      Your next 10 appointments already scheduled     Sep 20, 2018  8:20 AM CDT   Return Visit with Kalyan Smith PA-C   [facility] ([facility])    [address] [address] [address]   [phone]            Nov  "08, 2018  8:00 AM CST   Return Visit with Reba Vallecillo MD   [facility] ([facility])    [address] [facility]  [address] [place], [place] [address]   [phone]              Who to contact     If you have questions or need follow up information about today's clinic visit or your schedule please contact [facility] directly at [phone].  Normal or non-critical lab and imaging results will be communicated to you by Shootitlivehart, letter or phone within 4 business days after the clinic has received the results. If you do not hear from us within 7 days, please contact the clinic through nPariot or phone. If you have a critical or abnormal lab result, we will notify you by phone as soon as possible.  Submit refill requests through [employer] or call your pharmacy and they will forward the refill request to us. Please allow 3 business days for your refill to be completed.          Additional Information About Your Visit        ShootitliveharOkanjo Information     [employer] gives you secure access to your electronic health record. If you see a primary care provider, you can also send messages to your care team and make appointments. If you have questions, please call your primary care clinic.  If you do not have a primary care provider, please call [phone] and they will assist you.        Care EveryWhere ID     This is your Care EveryWhere ID. This could be used by other organizations to access your [place] medical records  [id number]        Your Vitals Were     Pulse Temperature Height Pulse Oximetry BMI (Body Mass Index)       70 98.1  F (36.7  C) (Oral) 5' 10.5\" (1.791 m) 98% 28.8 kg/m2        Blood Pressure from Last 3 Encounters:   09/06/18 114/76   08/30/18 108/68   08/27/18 106/70    Weight from Last 3 Encounters:   09/06/18 203 lb 9.6 oz (92.4 kg)   08/30/18 200 lb (90.7 kg)   08/27/18 200 lb 14.4 oz (91.1 kg)              Today, you had the following     No orders found for " display         Today's Medication Changes          These changes are accurate as of 9/6/18  8:30 AM.  If you have any questions, ask your nurse or doctor.               Start taking these medicines.        Dose/Directions    lidocaine 5 % ointment   Commonly known as:  XYLOCAINE   Used for:  Tear of medial meniscus of right knee, current, unspecified tear type, subsequent encounter        Apply topically as needed for moderate pain   Quantity:  50 g   Refills:  1            Where to get your medicines      These medications were sent to [facility] [address] [place] [address] [address] [address]  [id number] [place] [address]     Phone:  [phone]     lidocaine 5 % ointment                Primary Care Provider Office Phone # Fax #    Yogesh Mcfadden [phone] [address] [facility] [address]        Equal Access to Services     [facility] : Hadii em johnson hadasho Soomar, waaxda luqadaha, qaybta kaalmada adeegyada, jamel alexander . So [facility] [phone].    ATENCIÓN: Si habla español, tiene a yanez disposición servicios gratuitos de asistencia lingüística. Huey al [phone].    We comply with applicable federal civil rights laws and [place] laws. We do not discriminate on the basis of race, color, national origin, age, disability, sex, sexual orientation, or gender identity.            Thank you!     Thank you for choosing [facility]  for your care. Our goal is always to provide you with excellent care. Hearing back from our patients is one way we can continue to improve our services. Please take a few minutes to complete the written survey that you may receive in the mail after your visit with us. Thank you!             Your Updated Medication List - Protect others around you: Learn how to safely use, store and throw away your medicines at www.disposemymeds.org.          This  list is accurate as of 9/6/18  8:30 AM.  Always use your most recent med list.                   Brand Name Dispense Instructions for use Diagnosis    atorvastatin 40 MG tablet    LIPITOR    90 tablet    Take 1 tablet (40 mg) by mouth daily    Hyperlipidemia LDL goal <160       buprenorphine HCl-naloxone HCl 8-2 MG per film    SUBOXONE    90 Film    Place 1 Film under the tongue every 8 hours    Chronic pain syndrome, Continuous opioid dependence (H)       clonazePAM 1 MG tablet    klonoPIN    72 tablet    38 tabs first two weeks (avg 2.7 tabs/day), 34 tabs second two weeks (avg 2.4 tabs/day)    Panic attack, BRIDGER (generalized anxiety disorder)       escitalopram 20 MG tablet    LEXAPRO    90 tablet    Take 1 tablet (20 mg) by mouth daily    BRIDGER (generalized anxiety disorder)       esomeprazole 20 MG CR capsule    nexIUM    30 capsule    Take 1 capsule (20 mg) by mouth every morning (before breakfast) Take 30-60 minutes before eating.        lidocaine 5 % ointment    XYLOCAINE    50 g    Apply topically as needed for moderate pain    Tear of medial meniscus of right knee, current, unspecified tear type, subsequent encounter       omega 3 1000 MG Caps     90 capsule    Take 1 g by mouth daily        ROBAXIN PO

## 2018-09-06 NOTE — TELEPHONE ENCOUNTER
Patient surgery moved up to 9/7/18 @ 9am.  Spoke to patient. He acknowledged time change and will arrive at 8am for surgery.

## 2018-09-06 NOTE — PATIENT INSTRUCTIONS
Let's work to get the MRI so we can figure out if this is meniscus or something else. As you said the pain is out of proportion for a meniscus.     For surgery - take all your meds.   Stop ibuprofen 24 hours before.     Before Your Surgery    Call your surgeon if there is any change in your health. This includes signs of a cold or flu (such as a sore throat, runny nose, cough, rash or fever).    Do not smoke, drink alcohol or take over the counter medicine (unless your surgeon or primary care doctor tells you to) for the 24 hours before and after surgery.    If you take prescribed drugs: Follow your doctor s orders about which medicines to take and which to stop until after surgery.    Eating and drinking prior to surgery: follow the instructions from your surgeon    Take a shower or bath the night before surgery. Use the soap your surgeon gave you to gently clean your skin. If you do not have soap from your surgeon, use your regular soap. Do not shave or scrub the surgery site.  Wear clean pajamas and have clean sheets on your bed.

## 2018-09-06 NOTE — PROGRESS NOTES
[facility]  [address]  [address]  [place] [address]  [phone]  Dept: [phone]    PRE-OP EVALUATION:  Today's date: 2018    Diego Meng (: 1975) presents for pre-operative evaluation assessment as requested by Dr. Cesar.  He requires evaluation and anesthesia risk assessment prior to undergoing surgery/procedure for treatment of Right Meniscus.    Fax number for surgical facility: [facility]  Primary Physician: Yogesh Mcfadden  Type of Anesthesia Anticipated: General    Patient has a Health Care Directive or Living Will:  NO    Preop Questions 2018   Who is doing your surgery? dr cesar   What are you having done? torn meniscus scope   Date of Surgery/Procedure: 18   Facility or Hospital where procedure/surgery will be performed: [facility]   1.  Do you have a history of Heart attack, stroke, stent, coronary bypass surgery, or other heart surgery? No   2.  Do you ever have any pain or discomfort in your chest? No   3.  Do you have a history of  Heart Failure? No   4.   Are you troubled by shortness of breath when:  walking on a level surface, or up a slight hill, or at night? No   5.  Do you currently have a cold, bronchitis or other respiratory infection? No   6.  Do you have a cough, shortness of breath, or wheezing? No   7.  Do you sometimes get pains in the calves of your legs when you walk? No   8. Do you or anyone in your family have previous history of blood clots? No   9.  Do you or does anyone in your family have a serious bleeding problem such as prolonged bleeding following surgeries or cuts? No   10. Have you ever had problems with anemia or been told to take iron pills? No   11. Have you had any abnormal blood loss such as black, tarry or bloody stools? No   12. Have you ever had a blood transfusion? No   13. Have you or any of your relatives ever had  problems with anesthesia? No   14. Do you have sleep apnea, excessive snoring or daytime drowsiness? No   15. Do you have any prosthetic heart valves? No   16. Do you have prosthetic joints? No       HPI:     HPI related to upcoming procedure: History of right knee pain, no known trauma. Evaluated by Orthopedic Surgery and thought to be meniscal injury. Surgery was recommended. In the past week or so had significant increase in pain- now shooting through knee. He tripped over an ottoman while on vacation but no other known trauma to the knee. Planning for MRI with Ortho as pain is disproportionate to meniscal injury.    ANXIETY - on chronic bzds, tapering with PCP.   CHRONIC PAIN SYNDROME, CONTINUOUS OPOID DEPENDENCE - on suboxone, managed by Pain team (Dr. Vallecillo).  HYPERLIPIDEMIA - good control on medications.    MEDICAL HISTORY:     Patient Active Problem List    Diagnosis Date Noted     Myofascial pain 07/27/2018     Priority: Medium     Tobacco use disorder 07/27/2018     Priority: Medium     Severe episode of recurrent major depressive disorder, without psychotic features (H) 07/10/2018     Priority: Medium     Encounter for long-term (current) use of high-risk medication 08/17/2017     Priority: Medium     Continuous opioid dependence (H) 09/08/2016     Priority: Medium     [facility] treatment with [facility]       Panic attack 09/08/2016     Priority: Medium     See Generalized Anxiety       BRIDGER (generalized anxiety disorder) 09/08/2016     Priority: Medium     Patient is followed by [facility] for ongoing prescription of benzodiazepines.  All refills should be approved by this provider, or covering partner.    Medication(s): clonazepam 1 mg PO.   Maximum quantity per month: Tapering by 10% every two weeks.   Month 1 - 47 tabs first two weeks (avg 3.3 tabs/day), 42 tabs second two weeks (avg 3 tabs/day) = 89 tabs  Month 2 - 38 tabs first two weeks (avg 2.7 tabs/day), 34 tabs second two  weeks (avg 2.4 tabs/day) = 72 tabs  Month 3 - 31 tabs first two weeks (avg 2.2 tabs/day), 28 tabs second two weeks (avg 2 tabs/day) = 59 tabs  Month 4 - 25 tabs first two weeks (avg 1.8 tabs/day), 23 tabs second two weeks (avg 1.6 tabs/day) = 48 tabs  Month 5 - 21 tabs first two weeks (avg 1.5 tabs/day), 19 tabs second two weeks (avg 1.3 tabs/day) = 40 tabs  Month 6 - 17 tabs first two weeks (avg 1.2 tabs/day), 15 tabs second two weeks (avg 1 tabs/day) = 32 tabs    Clinic visit frequency required: Q 3 months     Controlled substance agreement on file: In progress. Sent, patient to return signed document.  Benzodiazepine use reviewed by psychiatry:  No - patient established with Dr. Mcfadden in July 2018 - agreed to taper by 10% every 2 weeks which is the slowest rate that Psychiatry recommended (see notes by Priscilla Vargas NP).     Last [facility] website verification:  done on 7/12/18   [email or web address]         Hyperlipidemia LDL goal <160 09/08/2016     Priority: Medium     Chronic pain syndrome 09/08/2016     Priority: Medium     Intractable back pain 05/11/2015     Priority: Medium     [facility] prescribing suboxone, Dr. Serrano       Postoperative back pain 05/10/2015     Priority: Medium     Secondary to diskectomy and laminectomy 5/2015        Past Medical History:   Diagnosis Date     Anxiety      Chronic radicular lumbar pain      Opioid dependence (H)      Other chronic pain      Past Surgical History:   Procedure Laterality Date     ARTHROSCOPY KNEE WITH MEDIAL MENISCECTOMY Left 11/28/2016    Procedure: ARTHROSCOPY KNEE WITH MEDIAL MENISCECTOMY;  Surgeon: Geovani Kaufman MD;  Location: [facility] OR     BACK SURGERY  5/2015    laminectomy, discectomy L4-L5     Current Outpatient Prescriptions   Medication Sig Dispense Refill     atorvastatin (LIPITOR) 40 MG tablet Take 1 tablet (40 mg) by mouth daily 90 tablet 3     buprenorphine HCl-naloxone HCl (SUBOXONE) 8-2 MG per film Place 1 Film  "under the tongue every 8 hours 90 Film 2     clonazePAM (KLONOPIN) 1 MG tablet 38 tabs first two weeks (avg 2.7 tabs/day), 34 tabs second two weeks (avg 2.4 tabs/day) 72 tablet 0     escitalopram (LEXAPRO) 20 MG tablet Take 1 tablet (20 mg) by mouth daily 90 tablet 3     esomeprazole (NEXIUM) 20 MG capsule Take 1 capsule (20 mg) by mouth every morning (before breakfast) Take 30-60 minutes before eating. 30 capsule 0     lidocaine (XYLOCAINE) 5 % ointment Apply topically as needed for moderate pain 50 g 1     Methocarbamol (ROBAXIN PO)        omega 3 1000 MG CAPS Take 1 g by mouth daily 90 capsule      OTC products: NSAIDS and tylenol    Allergies   Allergen Reactions     Nsaids Rash      Latex Allergy: NO    Social History   Substance Use Topics     Smoking status: Former Smoker     Packs/day: 1.00     Years: 20.00     Types: Cigarettes     Quit date: 7/27/2018     Smokeless tobacco: Never Used     Alcohol use No     History   Drug Use No       REVIEW OF SYSTEMS:   Constitutional, neuro, ENT, endocrine, pulmonary, cardiac, gastrointestinal, genitourinary, musculoskeletal, integument and psychiatric systems are negative, except as otherwise noted.    EXAM:   /76  Pulse 70  Temp 98.1  F (36.7  C) (Oral)  Ht 5' 10.5\" (1.791 m)  Wt 203 lb 9.6 oz (92.4 kg)  SpO2 98%  BMI 28.8 kg/m2    GENERAL APPEARANCE: healthy, alert and no distress     EYES: EOMI,  PERRL     HENT: ear canals and TM's normal and nose and mouth without ulcers or lesions     NECK: no adenopathy, no asymmetry, masses, or scars and thyroid normal to palpation     RESP: lungs clear to auscultation - no rales, rhonchi or wheezes     CV: regular rates and rhythm, normal S1 S2, no S3 or S4 and no murmur, click or rub     ABDOMEN:  soft, nontender, no HSM or masses and bowel sounds normal     MS: right knee tender along medial joint line and posterior aspect, no effusion; other extremities normal- no gross deformities noted, no evidence of " inflammation in joints, FROM in all extremities.     SKIN: no suspicious lesions or rashes     NEURO: Normal strength and tone, sensory exam grossly normal, mentation intact and speech normal     PSYCH: mentation appears normal. and affect normal/bright     LYMPHATICS: No cervical adenopathy    DIAGNOSTICS:   EKG: Not indicated due to non-vascular surgery and low risk of event (age <65 and without cardiac risk factors)    Recent Labs   Lab Test  01/05/18   0906  05/12/15   0718   NA  142  140   POTASSIUM  4.2  4.3   CR  0.78  0.73      IMPRESSION:   Reason for surgery/procedure: Right knee pain, suspected meniscal injury  Diagnosis/reason for consult: Preoperative risk assessment    The proposed surgical procedure is considered INTERMEDIATE risk.    REVISED CARDIAC RISK INDEX  The patient has the following serious cardiovascular risks for perioperative complications such as (MI, PE, VFib and 3  AV Block):  No serious cardiac risks  INTERPRETATION: 0 risks: Class I (very low risk - 0.4% complication rate)    The patient has the following additional risks for perioperative complications:  High tolerance to opioid analgesics due to opioid dependence.  High tolerance to benzodiazepines due to chronic use.      ICD-10-CM    1. Preop general physical exam Z01.818    2. Tear of medial meniscus of right knee, current, unspecified tear type, subsequent encounter S83.241D lidocaine (XYLOCAINE) 5 % ointment   3. Continuous opioid dependence (H) F11.20        RECOMMENDATIONS:     --Awaiting MRI to determine extent of knee damage. Assisted with scheduling today in clinic. Reviewed with patient that his pain is out of proportion to meniscal tear and most people do not require opioids for a meniscal tear - he agreed and is interested in getting the MRI done. Trial of topical lidocaine getl. Will defer preop pain management to Orthopedics if indicated.     --Consult hospital rounder / IM / Pain team to assist post-op medical  "management if patient has difficulties with pain control.     --Patient is to take all scheduled medications on the day of surgery EXCEPT for modifications listed below.  Anticoagulant or Antiplatelet Medication Use  NSAIDS: Ibuprofen (Motrin):         Stop one day prior to surgery  Suboxone Management   Patient has been in contact with Dr. Vallecillo (Pain Physician) who recommended the following: \"There is no need to stop your Suboxone prior to a knee scope.  For outpatient surgery we do not take people off.  The anesthesiologist will use regional anesthesia to help with post op pain. I would recommend that you continue your suboxone and take whatever supplemental medications Dr. Kaufman gives you for post op pain in addition to your suboxone\"    Would consider regional block if an option for anesthesia to assist with post-op pain management. Mr. Meng had a similar procedure on his left knee - notes they had trouble managing pain initially but were able to control it and he managed pain well with oral meds on discharge.     Will route PreOp to Surgeon so that their team can be in touch with Anesthesia if necessary.    APPROVAL GIVEN to proceed with proposed procedure, without further diagnostic evaluation     Signed Electronically by: Yogesh Mcfadden MD    Copy of this evaluation report is provided to requesting physician.    [facility] Preop Guidelines    Revised Cardiac Risk Index  "

## 2018-09-07 ENCOUNTER — TRANSFERRED RECORDS (OUTPATIENT)
Dept: HEALTH INFORMATION MANAGEMENT | Facility: CLINIC | Age: 43
End: 2018-09-07

## 2018-09-10 ENCOUNTER — MYC MEDICAL ADVICE (OUTPATIENT)
Dept: ADDICTION MEDICINE | Facility: CLINIC | Age: 43
End: 2018-09-10

## 2018-09-10 ENCOUNTER — TELEPHONE (OUTPATIENT)
Dept: ORTHOPEDICS | Facility: CLINIC | Age: 43
End: 2018-09-10

## 2018-09-10 NOTE — TELEPHONE ENCOUNTER
Devon below from patient. Sending to provider to review as LUIS Roberson, just wanted to report that my knee scope went well... and i did recieve some pain medication post op from Dr. Kaufman prescribed 30 hydrocodone 5/325 for post op pain.  I stayed on the suboxone through the surgery and it did make recovery difficult as the pain kept spiking through the roof in the recovery period. But they did finally get it under control.    The anesthesiologist decided preop not to do the nerve block, because the procedure was just a scope.    Anyway, wanted to report the pain meds per my pain contract.    Thanks!  Diego MARCN, RN Care Coordinator  [facility]

## 2018-09-12 DIAGNOSIS — F41.1 GAD (GENERALIZED ANXIETY DISORDER): ICD-10-CM

## 2018-09-12 DIAGNOSIS — F41.0 PANIC ATTACK: ICD-10-CM

## 2018-09-12 NOTE — TELEPHONE ENCOUNTER
clonazePAM (KLONOPIN) 1 MG tablet     Last Written Prescription Date:  08/15/2018  Last Fill Quantity: 72,   # refills: 0  Last Office Visit: 9/06/2018  Future Office visit:    Next 5 appointments (look out 90 days)     Sep 17, 2018  2:40 PM CDT   Return Visit with Kalyan Smith PA-C   [facility] ([facility])    [address] [address] [address]   [phone]            Nov 08, 2018  8:00 AM CST   Return Visit with Reba Vallecillo MD   [facility] ([facility])    [address]  [facility] [address]   [phone]                   Routing refill request to provider for review/approval because:  Drug not on the [facility], [facility] or  Health refill protocol or controlled substance

## 2018-09-13 NOTE — TELEPHONE ENCOUNTER
Patient is tapering off the medication.    ** checked, last refilled 8/15 for #72** ( 28 day supply)    Elizabeth Oleary RN  Message handled by Nurse Triage.

## 2018-09-14 RX ORDER — CLONAZEPAM 1 MG/1
TABLET ORAL
Qty: 59 TABLET | Refills: 0 | Status: SHIPPED | OUTPATIENT
Start: 2018-09-15 | End: 2018-10-11

## 2018-09-14 NOTE — TELEPHONE ENCOUNTER
reviewed -     Extra narcotics from recent knee surgery.     Clonazepam last filled 8/15.     Taper Month #3.     Printed, signed, in my COMPLETE box.    LINCOLN Mcfadden MD  Internal Medicine-Pediatrics

## 2018-09-25 ENCOUNTER — OFFICE VISIT (OUTPATIENT)
Dept: ORTHOPEDICS | Facility: CLINIC | Age: 43
End: 2018-09-25
Payer: COMMERCIAL

## 2018-09-25 DIAGNOSIS — Z47.89 ORTHOPEDIC AFTERCARE: Primary | ICD-10-CM

## 2018-09-25 PROCEDURE — 99024 POSTOP FOLLOW-UP VISIT: CPT | Performed by: PHYSICIAN ASSISTANT

## 2018-09-25 NOTE — MR AVS SNAPSHOT
After Visit Summary   9/25/2018    Diego Meng    MRN: [id number]           Patient Information     Date Of Birth          1975        Visit Information        Provider Department      9/25/2018 8:40 AM Kalyan Smith PA-C [facility]        Today's Diagnoses     Orthopedic aftercare    -  1      Care Instructions    Gradually increase your activities as you can tolerated them, starting at a level well below what you would normally do.     Avoid aggravating activities such as kneeling, squat, crawl.    NSAIDs as tolerated.    Ice and elevate.    Follow up in 4 weeks if not improved.          Follow-ups after your visit        Your next 10 appointments already scheduled     Nov 08, 2018  8:00 AM CST   Return Visit with Reba Vallecillo MD   [facility] ([facility])    [address] [address] [address]   [phone]              Who to contact     If you have questions or need follow up information about today's clinic visit or your schedule please contact [facility] directly at [phone].  Normal or non-critical lab and imaging results will be communicated to you by [employer], letter or phone within 4 business days after the clinic has received the results. If you do not hear from us within 7 days, please contact the clinic through [employer] or phone. If you have a critical or abnormal lab result, we will notify you by phone as soon as possible.  Submit refill requests through [employer] or call your pharmacy and they will forward the refill request to us. Please allow 3 business days for your refill to be completed.          Additional Information About Your Visit        MyChart Information     [employer] gives you secure access to your electronic health record. If you see a primary care provider, you can also send messages to your care team and make appointments. If you have  questions, please call your primary care clinic.  If you do not have a primary care provider, please call [phone] and they will assist you.        Care EveryWhere ID     This is your Care EveryWhere ID. This could be used by other organizations to access your [place] medical records  [id number]         Blood Pressure from Last 3 Encounters:   09/06/18 114/76   08/30/18 108/68   08/27/18 106/70    Weight from Last 3 Encounters:   09/06/18 203 lb 9.6 oz (92.4 kg)   08/30/18 200 lb (90.7 kg)   08/27/18 200 lb 14.4 oz (91.1 kg)              Today, you had the following     No orders found for display       Primary Care Provider Office Phone # Fax #    Yogesh Mcfadden [phone] [address] [facility] [address]        Equal Access to Services     [facility] : Hadii aad ku hadasho Soomaali, waaxda luqadaha, qaybta kaalmada adeegyada, waxay idiin hayaan maurice alexander . So [facility] [phone].    ATENCIÓN: Si habla español, tiene a yanez disposición servicios gratuitos de asistencia lingüística. Huey al [phone].    We comply with applicable federal civil rights laws and [place] laws. We do not discriminate on the basis of race, color, national origin, age, disability, sex, sexual orientation, or gender identity.            Thank you!     Thank you for choosing [facility]  for your care. Our goal is always to provide you with excellent care. Hearing back from our patients is one way we can continue to improve our services. Please take a few minutes to complete the written survey that you may receive in the mail after your visit with us. Thank you!             Your Updated Medication List - Protect others around you: Learn how to safely use, store and throw away your medicines at www.disposemymeds.org.          This list is accurate as of 9/25/18  9:49 AM.  Always use your most recent med list.                   Brand Name Dispense  Instructions for use Diagnosis    atorvastatin 40 MG tablet    LIPITOR    90 tablet    Take 1 tablet (40 mg) by mouth daily    Hyperlipidemia LDL goal <160       buprenorphine HCl-naloxone HCl 8-2 MG per film    SUBOXONE    90 Film    Place 1 Film under the tongue every 8 hours    Chronic pain syndrome, Continuous opioid dependence (H)       clonazePAM 1 MG tablet    klonoPIN    59 tablet    31 tabs first two weeks (avg 2.2 tabs/day), 28 tabs second two weeks (avg 2 tabs/day)    Panic attack, BRIDGER (generalized anxiety disorder)       escitalopram 20 MG tablet    LEXAPRO    90 tablet    Take 1 tablet (20 mg) by mouth daily    BRIDGER (generalized anxiety disorder)       esomeprazole 20 MG CR capsule    nexIUM    30 capsule    Take 1 capsule (20 mg) by mouth every morning (before breakfast) Take 30-60 minutes before eating.        lidocaine 5 % ointment    XYLOCAINE    50 g    Apply topically as needed for moderate pain    Tear of medial meniscus of right knee, current, unspecified tear type, subsequent encounter       omega 3 1000 MG Caps     90 capsule    Take 1 g by mouth daily        ROBAXIN PO

## 2018-09-25 NOTE — PROGRESS NOTES
HISTORY OF PRESENT ILLNESS:    Diego Meng is a 43 year old male who is seen in follow up for Right Knee arthroscopy, partial med meniscectomy, DOS 9/7/2018, Dr. Kaufman.  Present symptoms: Pt reports doing well until few days ago.  Had pain when kneeling and noticed increased swelling with lumps at bottom 2 incision sites.  Some stiffness and light pain at quad and below knee.  Has also been more active, does light carpentry. No drainage, redness, calve pain or fever. Treatments include rest/ice.  Using nothing for ambulation.  No new complaints.  Denies Chest pain, Calve pain, Fever, Chills.    PHYSICAL EXAM:  There were no vitals taken for this visit.  There is no height or weight on file to calculate BMI.   GENERAL APPEARANCE: healthy, alert and no distress   PSYCH:  mentation appears normal and affect normal/bright    MSK:  Right: Knee.  Observation:  1 cm protrusion at distal 2 incisions.  Ambulates: WNG.  Incision clean and dry, well healed.  No incisional erythema.   No Ecchymosis.  No calve pain on palpation.  Edema mild effusion at knee, none below knee.  CMS: jeanine incisional numbness, otherwise grossly intact.  AROM Flexion WNL.  Palpation: Mild tender at distal incisions with firm non fluctuant slightly mobile mass.      IMAGING INTERPRETATION:  None today.     ASSESSMENT:  Diego Meng is a 43 year old male S/P Right Knee arthroscopy, partial med meniscectomy, DOS 9/7/2018, Dr. Kaufman.    Mild effusion due to aggravation and probable injection resolution.    PLAN:  - Surgery discussed, images reviewed if applicable, and all questions were answered at this time.  - Care instructions given and verbally acknowledged.  - Medications: Ibuprofen.  - Physical Therapy: RICE  - AAT- while avoiding aggravating such as kneel, squat, crawl, climb.    Return to clinic PRN    Kalyan Smith PA-C    Dept. Orthopedic Surgery  [facility]   9/25/2018

## 2018-09-25 NOTE — PATIENT INSTRUCTIONS
Gradually increase your activities as you can tolerated them, starting at a level well below what you would normally do.     Avoid aggravating activities such as kneeling, squat, crawl.    NSAIDs as tolerated.    Ice and elevate.    Follow up in 4 weeks if not improved.

## 2018-10-11 DIAGNOSIS — F41.0 PANIC ATTACK: ICD-10-CM

## 2018-10-11 DIAGNOSIS — F41.1 GAD (GENERALIZED ANXIETY DISORDER): ICD-10-CM

## 2018-10-12 RX ORDER — CLONAZEPAM 1 MG/1
TABLET ORAL
Qty: 48 TABLET | Refills: 0 | Status: SHIPPED | OUTPATIENT
Start: 2018-10-14 | End: 2021-04-14

## 2018-10-12 NOTE — TELEPHONE ENCOUNTER
Continuing taper.     Month 4 - 25 tabs first two weeks (avg 1.8 tabs/day), 23 tabs second two weeks (avg 1.6 tabs/day) = 48 tabs    Printed, signed, in my COMPLETE box.    LINCOLN Mcfadden MD  Internal Medicine-Pediatrics

## 2018-10-12 NOTE — TELEPHONE ENCOUNTER
Requested Prescriptions   Pending Prescriptions Disp Refills     clonazePAM (KLONOPIN) 1 MG tablet  Last Written Prescription Date:  09/15/2018  Last Fill Quantity: 59,   # refills: 0  Last Office Visit: 2018  Future Office visit:    Next 5 appointments (look out 90 days)     2018  8:00 AM CST   Return Visit with Reba Vallecillo MD   [facility] ([facility])    [address] [address] [address]   [phone]                   Routing refill request to provider for review/approval because:  Drug not on the [facility], [facility] or [place] refill protocol or controlled substance   59 tablet 0     Si tabs first two weeks (avg 2.2 tabs/day), 28 tabs second two weeks (avg 2 tabs/day)    There is no refill protocol information for this order

## 2018-10-16 ENCOUNTER — MYC MEDICAL ADVICE (OUTPATIENT)
Dept: PEDIATRICS | Facility: CLINIC | Age: 43
End: 2018-10-16

## 2018-10-16 NOTE — TELEPHONE ENCOUNTER
Pt calling in pharmacy did not receive, refaxed to pharmacy on file.    Thanks  Paul CURRAN  Team Coodinator

## 2018-10-29 ENCOUNTER — OFFICE VISIT (OUTPATIENT)
Dept: PEDIATRICS | Facility: CLINIC | Age: 43
End: 2018-10-29
Payer: COMMERCIAL

## 2018-10-29 ENCOUNTER — OFFICE VISIT (OUTPATIENT)
Dept: ORTHOPEDICS | Facility: CLINIC | Age: 43
End: 2018-10-29
Payer: COMMERCIAL

## 2018-10-29 VITALS
HEIGHT: 71 IN | WEIGHT: 207.2 LBS | OXYGEN SATURATION: 96 % | DIASTOLIC BLOOD PRESSURE: 76 MMHG | SYSTOLIC BLOOD PRESSURE: 130 MMHG | TEMPERATURE: 98.6 F | HEART RATE: 83 BPM | BODY MASS INDEX: 29.01 KG/M2

## 2018-10-29 VITALS — WEIGHT: 200 LBS | SYSTOLIC BLOOD PRESSURE: 120 MMHG | DIASTOLIC BLOOD PRESSURE: 78 MMHG | BODY MASS INDEX: 28.29 KG/M2

## 2018-10-29 DIAGNOSIS — F41.1 GAD (GENERALIZED ANXIETY DISORDER): Primary | ICD-10-CM

## 2018-10-29 DIAGNOSIS — J18.9 PNEUMONIA OF BOTH LUNGS DUE TO INFECTIOUS ORGANISM, UNSPECIFIED PART OF LUNG: ICD-10-CM

## 2018-10-29 DIAGNOSIS — F13.20 BENZODIAZEPINE DEPENDENCE, CONTINUOUS (H): ICD-10-CM

## 2018-10-29 DIAGNOSIS — F11.20 CONTINUOUS OPIOID DEPENDENCE (H): ICD-10-CM

## 2018-10-29 DIAGNOSIS — Z47.89 ORTHOPEDIC AFTERCARE: Primary | ICD-10-CM

## 2018-10-29 DIAGNOSIS — F39 MOOD DISORDER (H): ICD-10-CM

## 2018-10-29 DIAGNOSIS — M54.9 INTRACTABLE BACK PAIN: ICD-10-CM

## 2018-10-29 DIAGNOSIS — G89.4 CHRONIC PAIN SYNDROME: ICD-10-CM

## 2018-10-29 PROCEDURE — 99214 OFFICE O/P EST MOD 30 MIN: CPT | Performed by: INTERNAL MEDICINE

## 2018-10-29 PROCEDURE — 99024 POSTOP FOLLOW-UP VISIT: CPT | Performed by: ORTHOPAEDIC SURGERY

## 2018-10-29 RX ORDER — AZITHROMYCIN 250 MG/1
TABLET, FILM COATED ORAL
Qty: 6 TABLET | Refills: 0 | Status: SHIPPED | OUTPATIENT
Start: 2018-10-29 | End: 2019-01-31

## 2018-10-29 RX ORDER — BUPRENORPHINE AND NALOXONE 8; 2 MG/1; MG/1
1 FILM, SOLUBLE BUCCAL; SUBLINGUAL EVERY 8 HOURS
Qty: 27 FILM | Refills: 0 | Status: SHIPPED | OUTPATIENT
Start: 2018-10-29 | End: 2019-01-31

## 2018-10-29 ASSESSMENT — ANXIETY QUESTIONNAIRES
1. FEELING NERVOUS, ANXIOUS, OR ON EDGE: NEARLY EVERY DAY
5. BEING SO RESTLESS THAT IT IS HARD TO SIT STILL: NEARLY EVERY DAY
IF YOU CHECKED OFF ANY PROBLEMS ON THIS QUESTIONNAIRE, HOW DIFFICULT HAVE THESE PROBLEMS MADE IT FOR YOU TO DO YOUR WORK, TAKE CARE OF THINGS AT HOME, OR GET ALONG WITH OTHER PEOPLE: VERY DIFFICULT
3. WORRYING TOO MUCH ABOUT DIFFERENT THINGS: NEARLY EVERY DAY
7. FEELING AFRAID AS IF SOMETHING AWFUL MIGHT HAPPEN: NEARLY EVERY DAY
GAD7 TOTAL SCORE: 20
2. NOT BEING ABLE TO STOP OR CONTROL WORRYING: NEARLY EVERY DAY
6. BECOMING EASILY ANNOYED OR IRRITABLE: MORE THAN HALF THE DAYS

## 2018-10-29 ASSESSMENT — PATIENT HEALTH QUESTIONNAIRE - PHQ9
SUM OF ALL RESPONSES TO PHQ QUESTIONS 1-9: 4
5. POOR APPETITE OR OVEREATING: NEARLY EVERY DAY

## 2018-10-29 NOTE — PATIENT INSTRUCTIONS
Anxiety  Congratulations on the work news.   Life is going to continue to have highs and lows and with that can come increases in your anxiety. I want to make sure we have more in your toolkit that we can ramp up for those times besides the medications.   Clonazepam taper will stay the same for next month and then we will resume tapering even if things are uneasy.  Make an appointment with DOUG RICHARDS, PHD. I think it's great he has a pain background. Ask specifically about CBT for anxiety.   It's really important that you stay active for stress release even with the knee - can do upper body workouts, ask Dr. Kaufman if pool therapy would be okay (let me know and I can put in a referral).     Lungs  More of a walking pneumonia - antibiotics work for 10 days. Let me know if not starting to get better by the end of the course.     I'll see you back in 1 month (this would be our regular 3 month follow up).

## 2018-10-29 NOTE — MR AVS SNAPSHOT
After Visit Summary   10/29/2018    Diego Meng    MRN: [id number]           Patient Information     Date Of Birth          1975        Visit Information        Provider Department      10/29/2018 11:50 AM Yogesh Mcfadden MD [facility]        Today's Diagnoses     BRIDGER (generalized anxiety disorder)    -  1    Pneumonia of both lungs due to infectious organism, unspecified part of lung          Care Instructions    Anxiety  Congratulations on the work news.   Life is going to continue to have highs and lows and with that can come increases in your anxiety. I want to make sure we have more in your toolkit that we can ramp up for those times besides the medications.   Clonazepam taper will stay the same for next month and then we will resume tapering even if things are uneasy.  Make an appointment with DOUG RICHARDS, PHD. I think it's great he has a pain background. Ask specifically about CBT for anxiety.   It's really important that you stay active for stress release even with the knee - can do upper body workouts, ask Dr. Kaufman if pool therapy would be okay (let me know and I can put in a referral).     Lungs  More of a walking pneumonia - antibiotics work for 10 days. Let me know if not starting to get better by the end of the course.     I'll see you back in 1 month (this would be our regular 3 month follow up).           Follow-ups after your visit        Follow-up notes from your care team     Return in about 4 weeks (around 11/26/2018) for Follow Up.      Your next 10 appointments already scheduled     Oct 29, 2018  2:40 PM CDT   Return Visit with Geovani Kaufman MD   [facility] ORTHOPEDIC SURGERY ([facility] [place])    [address] [address] [address]   [phone]            Nov 08, 2018  8:00 AM CST   Return Visit with Reba Vallecillo MD   [facility] ([facility]  "[place])    [address]  [address]  [place] [address]   [phone]            Nov 30, 2018  7:10 AM CST   SHORT with Yogesh Mcfadden MD   [facility] [place] ([facility]    [address]  [address]  [place] [address] [phone]              Who to contact     If you have questions or need follow up information about today's clinic visit or your schedule please contact [facility] directly at [phone].  Normal or non-critical lab and imaging results will be communicated to you by [email or web address], letter or phone within 4 business days after the clinic has received the results. If you do not hear from us within 7 days, please contact the clinic through [place] [employer] or phone. If you have a critical or abnormal lab result, we will notify you by phone as soon as possible.  Submit refill requests through Go!Foton or call your pharmacy and they will forward the refill request to us. Please allow 3 business days for your refill to be completed.          Additional Information About Your Visit        [email or web address] Information     Go!Foton gives you secure access to your electronic health record. If you see a primary care provider, you can also send messages to your care team and make appointments. If you have questions, please call your primary care clinic.  If you do not have a primary care provider, please call [phone] and they will assist you.        Care EveryWhere ID     This is your Care EveryWhere ID. This could be used by other organizations to access your [facility] medical records  [id number]        Your Vitals Were     Pulse Temperature Height Pulse Oximetry BMI (Body Mass Index)       83 98.6  F (37  C) (Oral) 5' 10.5\" (1.791 m) 96% 29.31 kg/m2        Blood Pressure from Last 3 Encounters:   10/29/18 130/76   09/06/18 114/76   08/30/18 108/68    Weight from Last 3 Encounters:   10/29/18 207 lb 3.2 oz (94 kg)   09/06/18 203 lb 9.6 oz (92.4 kg) "   08/30/18 200 lb (90.7 kg)              Today, you had the following     No orders found for display         Today's Medication Changes          These changes are accurate as of 10/29/18 12:42 PM.  If you have any questions, ask your nurse or doctor.               Start taking these medicines.        Dose/Directions    azithromycin 250 MG tablet   Commonly known as:  ZITHROMAX   Used for:  Pneumonia of both lungs due to infectious organism, unspecified part of lung   Started by:  Yogesh Mcfadden MD        Two tablets first day, then one tablet daily for four days.   Quantity:  6 tablet   Refills:  0         These medicines have changed or have updated prescriptions.        Dose/Directions    buprenorphine HCl-naloxone HCl 8-2 MG per film   Commonly known as:  SUBOXONE   This may have changed:  additional instructions   Used for:  Chronic pain syndrome, Continuous opioid dependence (H)   Changed by:  Reba Vallecillo MD        Dose:  1 Film   Place 1 Film under the tongue every 8 hours Ok to dispense and start on 10/29/18   Quantity:  27 Film   Refills:  0            Where to get your medicines      These medications were sent to [facility] [address] [place] [address]     Phone:  [phone]     azithromycin 250 MG tablet         Some of these will need a paper prescription and others can be bought over the counter.  Ask your nurse if you have questions.     Bring a paper prescription for each of these medications     buprenorphine HCl-naloxone HCl 8-2 MG per film                Primary Care Provider Office Phone # Fax #    Yogesh Mcfadden [phone]       [facility] [facility] [address]        Equal Access to Services     [facility] Hospital[facility] AH: Olga Eaton, rere roa, qaybann madrigal, jamel crouch. So [facility]  [phone].    ATENCIÓN: Si lemuel jacobson, tiene a yanez disposición servicios gratuitos de asistencia lingüística. Huey pereira [phone].    We comply with applicable federal civil rights laws and [place] laws. We do not discriminate on the basis of race, color, national origin, age, disability, sex, sexual orientation, or gender identity.            Thank you!     Thank you for choosing [facility]  for your care. Our goal is always to provide you with excellent care. Hearing back from our patients is one way we can continue to improve our services. Please take a few minutes to complete the written survey that you may receive in the mail after your visit with us. Thank you!             Your Updated Medication List - Protect others around you: Learn how to safely use, store and throw away your medicines at www.disposemymeds.org.          This list is accurate as of 10/29/18 12:42 PM.  Always use your most recent med list.                   Brand Name Dispense Instructions for use Diagnosis    atorvastatin 40 MG tablet    LIPITOR    90 tablet    Take 1 tablet (40 mg) by mouth daily    Hyperlipidemia LDL goal <160       azithromycin 250 MG tablet    ZITHROMAX    6 tablet    Two tablets first day, then one tablet daily for four days.    Pneumonia of both lungs due to infectious organism, unspecified part of lung       buprenorphine HCl-naloxone HCl 8-2 MG per film    SUBOXONE    27 Film    Place 1 Film under the tongue every 8 hours Ok to dispense and start on 10/29/18    Chronic pain syndrome, Continuous opioid dependence (H)       clonazePAM 1 MG tablet    klonoPIN    48 tablet    25 tabs first two weeks (avg 1.8 tabs/day), 23 tabs second two weeks (avg 1.6 tabs/day)    Panic attack, BRIDGER (generalized anxiety disorder)       escitalopram 20 MG tablet    LEXAPRO    90 tablet    Take 1 tablet (20 mg) by mouth daily    BRIDGER (generalized anxiety disorder)       esomeprazole 20 MG CR capsule    nexIUM    30  capsule    Take 1 capsule (20 mg) by mouth every morning (before breakfast) Take 30-60 minutes before eating.        lidocaine 5 % ointment    XYLOCAINE    50 g    Apply topically as needed for moderate pain    Tear of medial meniscus of right knee, current, unspecified tear type, subsequent encounter       omega 3 1000 MG Caps     90 capsule    Take 1 g by mouth daily        ROBAXIN PO

## 2018-10-29 NOTE — LETTER
10/29/2018         RE: Diego Meng  [address]  [place] [address]        Dear Colleague,    Thank you for referring your patient, Diego Meng, to the [facility] ORTHOPEDIC SURGERY. Please see a copy of my visit note below.    HISTORY OF PRESENT ILLNESS:    Diego Meng is a 43 year old male who is seen in follow up for right knee pain, patient had right knee arthroscopy DOS 9/25/18. Patient reports at his post-op appointment he had some fluid in his knee, he also notes a cyst on the lateral aspect of his knee that is becoming more painful and larger. Patient states he has increased pain with kneeling on the right knee and with squatting due to pain. Patient reports swelling around the meniscus/ joint line.  Mild discomfort with rest,  pain with kneeling 9/10.     Patient would also like to have wrist evaluated. Cyst is present on the dorsal aspect of the right wrist. Limited wrist flexion. Patient reports pain with activity. Patient notes he has had previous cyst that disappeared approximately 7 years ago, the new nodule has continued to grow in size over the last few years. Patient is right hand dominant.     Treatments tried to this point: icing, Ibuprofen, attempted to kneel with use of pillow for additional padding with continued pain.     PHYSICAL EXAM:  /78 (BP Location: Right arm, Patient Position: Chair, Cuff Size: Adult Regular)  Wt 200 lb (90.7 kg)  BMI 28.29 kg/m2  Body mass index is 28.29 kg/(m^2).   GENERAL APPEARANCE: healthy, alert and no distress   SKIN: no suspicious lesions or rashes  NEURO: Normal strength and tone, mentation intact and speech normal  VASCULAR:  good pulses, and cappillary refill   LYMPH: no lymphadenopathy   PSYCH:  mentation appears normal and affect normal/bright    MSK:  The patient ambulates without an antalgic gait.  The patient is able to get on and off the exam table without difficulty.        KNEE: Examination of  the right knee reveals the lower extremity to have a Normal anatomic alignment.  There is no erythema, ecchymosis nor edema.  He has a small area of fluid under the lateral portal assuming from an effusion herniating through the capsule.  There is no effusion present clinically.  There is no significant warmth.  Range of motion is 0 to 125 degrees, without crepitus.  There is no tenderness to palpation at the both medial and lateral joint line.  Kamar's is negative without crepitus. The knee is ligamentously stable. Patello-femoral grind test is positive.      The calves and thighs are symmetric, without atrophy and non-tender to palpation. Francisca's sign is negative, bilaterally.   CMS is intact to the toes.       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  I had a long discussion with Diego regarding the pathology and prognosis.  He is going to avoid any type of forced extension exercises to his knee.  When he has flareups, he will come in to get corticosteroid injections.      Kalin Kaufman MD  Dept. Orthopedic Surgery  [facility]           Again, thank you for allowing me to participate in the care of your patient.        Sincerely,        Geovani Kaufman MD

## 2018-10-29 NOTE — TELEPHONE ENCOUNTER
Medication refill information reviewed.     Due date for Suboxone is anytime     Prescriptions prepped for review.     Will route to provider.     Tierra MACIAS-RN Care Coordinator  [facility]

## 2018-10-29 NOTE — MR AVS SNAPSHOT
After Visit Summary   10/29/2018    Diego Meng    MRN: [id number]           Patient Information     Date Of Birth          1975        Visit Information        Provider Department      10/29/2018 2:40 PM Geovani Kaufman MD [facility] ORTHOPEDIC SURGERY        Today's Diagnoses     Orthopedic aftercare    -  1       Follow-ups after your visit        Your next 10 appointments already scheduled     Nov 08, 2018  8:00 AM CST   Return Visit with Reba Vallecillo MD   [facility] ([facility])    [address]  [address]  [place] [address]   [phone]            Nov 30, 2018  7:10 AM CST   [facility] with [facility]ogesh Mc[facility]add[facility] MD   [facility]    [address]  [address]  [place] [address] [phone]              Who to contact     If you have questions or need follow up information about today's clinic visit or your schedule please contact [facility] ORTHOPEDIC SURGERY directly at [phone].  Normal or non-critical lab and imaging results will be communicated to you by Ushihart, letter or phone within 4 business days after the clinic has received the results. If you do not hear from us within 7 days, please contact the clinic through Ushihart or phone. If you have a critical or abnormal lab result, we will notify you by phone as soon as possible.  Submit refill requests through EMUZE or call your pharmacy and they will forward the refill request to us. Please allow 3 business days for your refill to be completed.          Additional Information About Your Visit        MyChart Information     EMUZE gives you secure access to your electronic health record. If you see a primary care provider, you can also send messages to your care team and make appointments. If you have questions, please call your primary care clinic.  If you do not have a primary care provider,  please call [phone] and they will assist you.        Care EveryWhere ID     This is your Care EveryWhere ID. This could be used by other organizations to access your [facility] medical records  [id number]        Your Vitals Were     BMI (Body Mass Index)                   28.29 kg/m2            Blood Pressure from Last 3 Encounters:   10/29/18 120/78   10/29/18 130/76   09/06/18 114/76    Weight from Last 3 Encounters:   10/29/18 200 lb (90.7 kg)   10/29/18 207 lb 3.2 oz (94 kg)   09/06/18 203 lb 9.6 oz (92.4 kg)              Today, you had the following     No orders found for display         Today's Medication Changes          These changes are accurate as of 10/29/18 11:59 PM.  If you have any questions, ask your nurse or doctor.               Start taking these medicines.        Dose/Directions    azithromycin 250 MG tablet   Commonly known as:  ZITHROMAX   Used for:  Pneumonia of both lungs due to infectious organism, unspecified part of lung   Started by:  Yogesh Mcfadden MD        Two tablets first day, then one tablet daily for four days.   Quantity:  6 tablet   Refills:  0         These medicines have changed or have updated prescriptions.        Dose/Directions    buprenorphine HCl-naloxone HCl 8-2 MG per film   Commonly known as:  SUBOXONE   This may have changed:  additional instructions   Used for:  Chronic pain syndrome, Continuous opioid dependence (H)   Changed by:  Reba Vallecillo MD        Dose:  1 Film   Place 1 Film under the tongue every 8 hours Ok to dispense and start on 10/29/18   Quantity:  27 Film   Refills:  0            Where to get your medicines      [facility] [address] [place] [address] [address] AT [address] [address]     Phone:  [phone]     azithromycin 250 MG tablet         Some of these will need a paper prescription and others can be bought over the counter.   Ask your nurse if you have questions.     Bring a paper prescription for each of these medications     buprenorphine HCl-naloxone HCl 8-2 MG per film                Primary Care Provider Office Phone # Fax #    Yogesh Viky Mcfadden [phone] [address] [facility] [address]        Equal Access to Services     PA DUMONT : Hadii aad ku hadasho Soomaali, waaxda luqadaha, qaybta kaalmada adeegyada, jamel mariain haybraydenn adeblane ferreirashashihumberto alexander . So [facility] [phone].    ATENCIÓN: Si habla español, tiene a yanez disposición servicios gratuitos de asistencia lingüística. Huey al [phone].    We comply with applicable federal civil rights laws and [place] laws. We do not discriminate on the basis of race, color, national origin, age, disability, sex, sexual orientation, or gender identity.            Thank you!     Thank you for choosing [facility] ORTHOPEDIC SURGERY  for your care. Our goal is always to provide you with excellent care. Hearing back from our patients is one way we can continue to improve our services. Please take a few minutes to complete the written survey that you may receive in the mail after your visit with us. Thank you!             Your Updated Medication List - Protect others around you: Learn how to safely use, store and throw away your medicines at www.disposemymeds.org.          This list is accurate as of 10/29/18 11:59 PM.  Always use your most recent med list.                   Brand Name Dispense Instructions for use Diagnosis    atorvastatin 40 MG tablet    LIPITOR    90 tablet    Take 1 tablet (40 mg) by mouth daily    Hyperlipidemia LDL goal <160       azithromycin 250 MG tablet    ZITHROMAX    6 tablet    Two tablets first day, then one tablet daily for four days.    Pneumonia of both lungs due to infectious organism, unspecified part of lung       buprenorphine HCl-naloxone HCl 8-2 MG per film    SUBOXONE    27 Film    Place 1 Film under the  tongue every 8 hours Ok to dispense and start on 10/29/18    Chronic pain syndrome, Continuous opioid dependence (H)       clonazePAM 1 MG tablet    klonoPIN    48 tablet    25 tabs first two weeks (avg 1.8 tabs/day), 23 tabs second two weeks (avg 1.6 tabs/day)    Panic attack, BRIDGER (generalized anxiety disorder)       escitalopram 20 MG tablet    LEXAPRO    90 tablet    Take 1 tablet (20 mg) by mouth daily    BRIDGER (generalized anxiety disorder)       esomeprazole 20 MG CR capsule    nexIUM    30 capsule    Take 1 capsule (20 mg) by mouth every morning (before breakfast) Take 30-60 minutes before eating.        lidocaine 5 % ointment    XYLOCAINE    50 g    Apply topically as needed for moderate pain    Tear of medial meniscus of right knee, current, unspecified tear type, subsequent encounter       omega 3 1000 MG Caps     90 capsule    Take 1 g by mouth daily        ROBAXIN PO

## 2018-10-29 NOTE — TELEPHONE ENCOUNTER
Received fax from pharmacy requesting refill(s) of buprenorphine HCl-naloxone HCl (SUBOXONE) 8-2 MG per film     Last picked up from pharmacy on 9/28/2018    Pt last seen by prescribing provider on 7/27/2018  Next appt scheduled for 11/8/2018     checked in the past 6 months? Yes If no, print current report and give to RN    Last urine drug screen date 7/27/2018  Current opioid agreement on file (completed within the last year) Yes Date of opioid agreement: 5/4/2018    Processing (pick one and delete the others):      Fax to [facility] pharmacy     Will route to nursing pool for review and preparation of prescription(s).

## 2018-10-29 NOTE — PROGRESS NOTES
SUBJECTIVE:   Diego Meng is a 43 year old male who presents to clinic today for the following health issues:      Anxiety Follow-Up    Status since last visit: Worsened     Other associated symptoms: having panic attacks    Complicating factors:   Significant life event: Yes-  Job    Current substance abuse: None  Depression symptoms: No  BRIDGER-7 SCORE 6/19/2017 7/5/2018 8/27/2018   Total Score 16 15 13       BRIDGER-7    Problems taking medications regularly: No    Medication side effects: fatigued    Diet: regular (no restrictions)    ---------    Was doing well with the taper - however the last month has been really challenging and he is having a tough time staying on schedule.     Feels like he does better when things are stable/even low. Has more anxiety when things are going well. Trying to use his other tools and stop his anxiety from getting away from him (not planning 5 steps ahead).     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 This is an exciting development for him but really increasing his anxiety.    He is not currently seeing a therapist. Went to the pain psychologist - went well. He recommended he see a Pain Psychiatrist and was given a name (which he doesn't remember) -> called and she said she was unable to see him because she was booking out so far. Would be interested in restarting therapy.     # Cough, sob  - x 2 weeks  - staying the same  - chills, has not checked his temp  - some facial pressure but not    Problem list and histories reviewed & adjusted, as indicated.  Additional history: as documented    Patient Active Problem List   Diagnosis     Postoperative back pain     Intractable back pain     Continuous opioid dependence (H)     Panic attack     BRIDGER (generalized anxiety disorder)     Hyperlipidemia LDL goal <160     Chronic pain syndrome     Encounter for long-term (current) use of high-risk medication     Severe episode of recurrent  major depressive disorder, without psychotic features (H)     Myofascial pain     Tobacco use disorder     Mood disorder (H)     Past Surgical History:   Procedure Laterality Date     ARTHROSCOPY KNEE WITH MEDIAL MENISCECTOMY Left 11/28/2016    Procedure: ARTHROSCOPY KNEE WITH MEDIAL MENISCECTOMY;  Surgeon: Geovani Kaufman MD;  Location: [facility] OR     BACK SURGERY  5/2015    laminectomy, discectomy L4-L5       Social History   Substance Use Topics     Smoking status: Former Smoker     Packs/day: 1.00     Years: 20.00     Types: Cigarettes     Quit date: 7/27/2018     Smokeless tobacco: Never Used     Alcohol use No     Family History   Problem Relation Age of Onset     Diabetes Father      Diabetes Paternal Grandfather      Hyperlipidemia Sister      Coronary Artery Disease No family hx of      Colon Cancer No family hx of      Prostate Cancer No family hx of          Current Outpatient Prescriptions   Medication Sig Dispense Refill     atorvastatin (LIPITOR) 40 MG tablet Take 1 tablet (40 mg) by mouth daily 90 tablet 3     azithromycin (ZITHROMAX) 250 MG tablet Two tablets first day, then one tablet daily for four days. 6 tablet 0     buprenorphine HCl-naloxone HCl (SUBOXONE) 8-2 MG per film Place 1 Film under the tongue every 8 hours Ok to dispense and start on 10/29/18 27 Film 0     clonazePAM (KLONOPIN) 1 MG tablet 25 tabs first two weeks (avg 1.8 tabs/day), 23 tabs second two weeks (avg 1.6 tabs/day) 48 tablet 0     escitalopram (LEXAPRO) 20 MG tablet Take 1 tablet (20 mg) by mouth daily 90 tablet 3     esomeprazole (NEXIUM) 20 MG capsule Take 1 capsule (20 mg) by mouth every morning (before breakfast) Take 30-60 minutes before eating. 30 capsule 0     omega 3 1000 MG CAPS Take 1 g by mouth daily 90 capsule      lidocaine (XYLOCAINE) 5 % ointment Apply topically as needed for moderate pain (Patient not taking: Reported on 10/29/2018) 50 g 1     Methocarbamol (ROBAXIN PO)        Allergies   Allergen  "Reactions     Nsaids Rash     Tolmetin Rash       Reviewed and updated as needed this visit by clinical staff  Allergies  Meds       Reviewed and updated as needed this visit by Provider         ROS:  Constitutional, HEENT, cardiovascular, pulmonary, gi and gu systems are negative, except as otherwise noted.    OBJECTIVE:     /76  Pulse 83  Temp 98.6  F (37  C) (Oral)  Ht 5' 10.5\" (1.791 m)  Wt 207 lb 3.2 oz (94 kg)  SpO2 96%  BMI 29.31 kg/m2  Body mass index is 29.31 kg/(m^2).  GENERAL: healthy, alert and no distress  EYES: Eyes grossly normal to inspection, PERRL and conjunctivae and sclerae normal  HENT: ear canals and TM's normal, nose and mouth without ulcers or lesions. No frontal or maxillary sinus tenderness.  NECK: no adenopathy, no asymmetry, masses, or scars  RESP: fair air movement with no increased resp distress, few bibasal rhonchi. No wheezes or crackles.  CV: regular rate and rhythm, normal S1 S2, no S3 or S4, no murmur, click or rub, no peripheral edema and peripheral pulses strong  MS: no gross musculoskeletal defects noted, no edema  PSYCH: mentation appears normal, affect normal/bright    Diagnostic Test Results:  none     ASSESSMENT/PLAN:     1. BRIDGER (generalized anxiety disorder)  2. Mood disorder (H)  3. Benzodiazepine dependence, continuous (H)  Concerned he will be unable to taper this next month given increased stress. Reviewed again the reasons for tapering (neg effects on his thinking, etc) and the fact that he has been chemically coping. I emphasized the importance of following with a therapist as we need more in his toolbox if his anxiety increases other than medications. He has seen the pain psychologist before and I do think that CBT would be helpful both for pain and anxiety. He will reach out and schedule.   Also emphasized the importance of exercise, even with his knee injury. Discussed pool therapy if okay with orthopedics.  We agreed to compromise and hold the taper " for one month while he establishes with therapy and then resume, even if his stress remains high. Problem List adjusted.     3. Pneumonia of both lungs due to infectious organism, unspecified part of lung  Possibly atypical pna given duration of symptoms without significant improvement and rhonchi on exam. See PI for anticipatory guidance.   - azithromycin (ZITHROMAX) 250 MG tablet; Two tablets first day, then one tablet daily for four days.  Dispense: 6 tablet; Refill: 0    Patient Instructions   Anxiety  Congratulations on the work news.   Life is going to continue to have highs and lows and with that can come increases in your anxiety. I want to make sure we have more in your toolkit that we can ramp up for those times besides the medications.   Clonazepam taper will stay the same for next month and then we will resume tapering even if things are uneasy.  Make an appointment with DOUG RICHARDS, PHD. I think it's great he has a pain background. Ask specifically about CBT for anxiety.   It's really important that you stay active for stress release even with the knee - can do upper body workouts, ask Dr. Kaufman if pool therapy would be okay (let me know and I can put in a referral).     Lungs  More of a walking pneumonia - antibiotics work for 10 days. Let me know if not starting to get better by the end of the course.     I'll see you back in 1 month (this would be our regular 3 month follow up).     Yogesh Mcfadden MD  [facility]

## 2018-10-29 NOTE — TELEPHONE ENCOUNTER
Peewee to get him to his November 8th appointment was faxed to the pharmacy.     Reba Vallecillo MD

## 2018-10-29 NOTE — PROGRESS NOTES
HISTORY OF PRESENT ILLNESS:    Diego Meng is a 43 year old male who is seen in follow up for right knee pain, patient had right knee arthroscopy DOS 9/25/18. Patient reports at his post-op appointment he had some fluid in his knee, he also notes a cyst on the lateral aspect of his knee that is becoming more painful and larger. Patient states he has increased pain with kneeling on the right knee and with squatting due to pain. Patient reports swelling around the meniscus/ joint line.  Mild discomfort with rest,  pain with kneeling 9/10.     Patient would also like to have wrist evaluated. Cyst is present on the dorsal aspect of the right wrist. Limited wrist flexion. Patient reports pain with activity. Patient notes he has had previous cyst that disappeared approximately 7 years ago, the new nodule has continued to grow in size over the last few years. Patient is right hand dominant.     Treatments tried to this point: icing, Ibuprofen, attempted to kneel with use of pillow for additional padding with continued pain.     PHYSICAL EXAM:  /78 (BP Location: Right arm, Patient Position: Chair, Cuff Size: Adult Regular)  Wt 200 lb (90.7 kg)  BMI 28.29 kg/m2  Body mass index is 28.29 kg/(m^2).   GENERAL APPEARANCE: healthy, alert and no distress   SKIN: no suspicious lesions or rashes  NEURO: Normal strength and tone, mentation intact and speech normal  VASCULAR:  good pulses, and cappillary refill   LYMPH: no lymphadenopathy   PSYCH:  mentation appears normal and affect normal/bright    MSK:  The patient ambulates without an antalgic gait.  The patient is able to get on and off the exam table without difficulty.        KNEE: Examination of the right knee reveals the lower extremity to have a Normal anatomic alignment.  There is no erythema, ecchymosis nor edema.  He has a small area of fluid under the lateral portal assuming from an effusion herniating through the capsule.  There is no effusion present  clinically.  There is no significant warmth.  Range of motion is 0 to 125 degrees, without crepitus.  There is no tenderness to palpation at the both medial and lateral joint line.  Kamar's is negative without crepitus. The knee is ligamentously stable. Patello-femoral grind test is positive.      The calves and thighs are symmetric, without atrophy and non-tender to palpation. Francisca's sign is negative, bilaterally.   CMS is intact to the toes.       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  I had a long discussion with Diego regarding the pathology and prognosis.  He is going to avoid any type of forced extension exercises to his knee.  When he has flareups, he will come in to get corticosteroid injections.      Kalin Kaufman MD  Dept. Orthopedic Surgery  [facility]

## 2018-10-30 ENCOUNTER — MYC MEDICAL ADVICE (OUTPATIENT)
Dept: PEDIATRICS | Facility: CLINIC | Age: 43
End: 2018-10-30

## 2018-10-30 ENCOUNTER — TELEPHONE (OUTPATIENT)
Dept: PEDIATRICS | Facility: CLINIC | Age: 43
End: 2018-10-30

## 2018-10-30 ASSESSMENT — ANXIETY QUESTIONNAIRES: GAD7 TOTAL SCORE: 20

## 2018-10-30 NOTE — TELEPHONE ENCOUNTER
Reason for call:  Other   Patient called regarding (reason for call): call back  Additional comments: Patient calling in switched from Dr. Murphy to Dr. Mcfadden, he is having anxiety issues, he is currently tapering from his anxiety medications and is having a lot of issues. He would like to switch back to Dr. Murphy. Would like to speak to a nurse as well, please callback.    Phone number to reach patient:  Home number on file [phone] (home)    Best Time:  any    Can we leave a detailed message on this number?  NO

## 2018-10-30 NOTE — TELEPHONE ENCOUNTER
Patient calling in regards to his Vitals [email or web address])t message he sent in today.     I let him know I routed the message directly to Dr. Murphy earlier.     He reviewed his message with me and it is a summary of what he sent in.     Encounter closed. See other encounter.

## 2018-11-05 ENCOUNTER — MYC MEDICAL ADVICE (OUTPATIENT)
Dept: PEDIATRICS | Facility: CLINIC | Age: 43
End: 2018-11-05

## 2018-11-05 DIAGNOSIS — F41.1 GAD (GENERALIZED ANXIETY DISORDER): ICD-10-CM

## 2018-11-05 DIAGNOSIS — F41.0 PANIC ATTACK: ICD-10-CM

## 2018-11-05 RX ORDER — CLONAZEPAM 1 MG/1
TABLET ORAL
Qty: 48 TABLET | Refills: 0 | Status: CANCELLED | OUTPATIENT
Start: 2018-11-05

## 2018-11-05 NOTE — TELEPHONE ENCOUNTER
Disregard below. Patient reported in another [employer] message that he is switching his Primary Care to [facility].     I updated patient's chart accordingly.

## 2018-11-05 NOTE — TELEPHONE ENCOUNTER
"Patient requesting early refill of Clonazepam. Reviewed chart. 10/14/18 rx for #48 tabs was to last 28 days. Copied notes below.     Informed patient that early refill of Clonazepam is not appropriate. Next refill due closer to 11/13/18.       Copied from 10/29/18 office visit:  \"Clonazepam taper will stay the same for next month and then we will resume tapering even if things are uneasy.\"    Copied from 7/16/18 [employer] message encounter:  \" Here is what the next 6 months of the clonazepam taper look like. A record of this is in your chart and my colleagues will use this same plan if I am out of the office.     Month 1 - 47 tabs first two weeks (avg 3.3 tabs/day), 42 tabs second two weeks (avg 3 tabs/day) = 89 tabs   Month 2 - 38 tabs first two weeks (avg 2.7 tabs/day), 34 tabs second two weeks (avg 2.4 tabs/day) = 72 tabs   Month 3 - 31 tabs first two weeks (avg 2.2 tabs/day), 28 tabs second two weeks (avg 2 tabs/day) = 59 tabs   Month 4 - 25 tabs first two weeks (avg 1.8 tabs/day), 23 tabs second two weeks (avg 1.6 tabs/day) = 48 tabs   Month 5 - 21 tabs first two weeks (avg 1.5 tabs/day), 19 tabs second two weeks (avg 1.3 tabs/day) = 40 tabs   Month 6 - 17 tabs first two weeks (avg 1.2 tabs/day), 15 tabs second two weeks (avg 1 tabs/day) = 32 tabs \"  "

## 2018-11-05 NOTE — TELEPHONE ENCOUNTER
Patient calling to speak with the nurse he is requesting to stop his care plan with Viky monsivais immediately. Please call patient back regarding this.

## 2018-11-07 NOTE — PROGRESS NOTES
"                          [facility]    Date of visit: 11/08/2018    Chief complaint:   Chief Complaint   Patient presents with     Pain     Interval history:  Diego Paintingadiroddy is a 43 year old male last seen by me for follow up on 7/27/18.      Brief history: 43 YEAR OLD MALE who began having low back pain at age 20.  Pain continued and progressed to neck and right shoulder pain for the last 20 years.  History of high dose opioids after having a L4-5 decompression at [facility] by Dr. Rios. Dose escalated and got \"out of control\".  He admits to overuse.  He was transitioned to buprenorphine by Dr. Almonte and was on very high dose 32mg when he was transferred to myself for ongoing care. PMHx is significant for anxiety.     Since his last visit, Diego Grayfransiscomike reports:  - Saw Teresa Reinasoterotheron - pain psychology on 7/11/2018.  Did not follow up  - He was tapering off his Klonopin with his PCP and when his new job was starting his anxiety worsened and he was overusing.  Has changed his PCP to [facility], Dr. Francis,  so he can contiue to have this prescribed.  He is currently on 2mg Klonopin/day and wants to stay on this.   - [place]  He is excited about this but it is longer hours.  .   - Low back pain has worsened and he is having surgery with Dr. Rios.  Plans are for a anterior L4-S1 fusion with pelvic flixation for SI joint dysfunction.  2 night hospital stay, however, he is putting a hold on that for now.   - Right knee scope 9/25/2018 with Dr. Kaufman and they had a lot of trouble keeping his pain under control after.  He took post op pain medication for about 5 days after.  Did not stop his Suboxone.   - He has been doing better on his Suboxone, pain is controlled and he is reporting that things are going well for him. He is willing to go back to 8mg films #2/day.  - Thoracic back pain is improved.  He has figured out what makes it hurt - " twisting.   - Left foot pain - plantar fascitis.  Resolved completely  - He has a torn medial meniscus in his left knee and thinks he will likely need surgery for that as well. He had a knee injection a month ago which was not helpful.   - Thoracic TRIGGER POINT INJECTION done at last follow up was not helpful  -He has had many different injections over the years none of which were helpful for more than a few days.    -His headaches are unchanged. They start at the back of his head/neck and radiate through his head to his eyes.  He attributes his headaches to rebound and has a very difficult time discontinuing excedrin.     Pain scores:  Pain intensity on average is 5 (7 last visit) on a scale of 0-10.     Current pain treatments:   Suboxone 8-2 mg #3/day  Excedrin #2-6/day for 20 years  lexapro 20 mg daily  Clonazepam 1 mg #2/day  Robaxin 500-1000mg PRN QID    Labs: CRP <2.9, RF <20, ESR 6    Side Effects: no side effects    Past pain treatments:  Diego Meng has been seen at a pain clinic in the past. [facility] Dr. Vázquez and [facility]  Medications:     Medrol dose carolin -no relief              botox -no relief              Anti-migraine: fioricet, migranal, eletriptan, frovatriptan, midrin, naraptriptan, sumatriptan, zolmitripatn -no relief from any of these              Anti-convulsants: depakote, topiramate, gabapentin, lyrica -all of these made him feel fuzzy and forgetful and they were not helpful              Opioids: oxycontin, morphine -no relief, hydrocodone -no relief, tramadol -no relief                Muscle relaxants: soma -no relief, flexeril -no relief and made him tired, norflex -no relief, tizanidine -no relief and made him tired              NSAIDs: allergic to this class of medications -rash, abdominal pain and cramping, toradol -upset stomach              Anti-depressants: cymbalta -no relief                Topicals: lidocaine -no relief  PT: [facility]  Ramesh Islas in 2016 -helped for shoulder but flared up back and neck  Psychology: yes for pain at the [facility] -'continues to use these skills'  Acupuncture: tried it -no relief  Chiropractic care: tried it -helped initially but then flared up pain  TENS Unit: no relief  Injections: R L4 and L5 TF BAY 12/24/16 -flared pain, cervical epidural steroid injection -provided 2-4 weeks of relief but had significant flare up for 2 weeks post-procedure, cervical trigger point injections -minimal relief, lumbar medial branch block -no relief, lumbar paraspinal trigger point injection 5/17/17 - not helpful, Thoracic TRIGGER POINT INJECTION 7/27/2018.   Surgery: right L4-L5 hemilaminectomy May 2015 Dr. Rios, Right knee scope 9/25/2018    Medications:  Current Outpatient Prescriptions   Medication Sig Dispense Refill     atorvastatin (LIPITOR) 40 MG tablet Take 1 tablet (40 mg) by mouth daily 90 tablet 3     azithromycin (ZITHROMAX) 250 MG tablet Two tablets first day, then one tablet daily for four days. 6 tablet 0     buprenorphine HCl-naloxone HCl (SUBOXONE) 8-2 MG per film Place 1 Film under the tongue every 8 hours Ok to dispense and start on 10/29/18 27 Film 0     clonazePAM (KLONOPIN) 1 MG tablet 25 tabs first two weeks (avg 1.8 tabs/day), 23 tabs second two weeks (avg 1.6 tabs/day) 48 tablet 0     escitalopram (LEXAPRO) 20 MG tablet Take 1 tablet (20 mg) by mouth daily 90 tablet 3     esomeprazole (NEXIUM) 20 MG capsule Take 1 capsule (20 mg) by mouth every morning (before breakfast) Take 30-60 minutes before eating. 30 capsule 0     lidocaine (XYLOCAINE) 5 % ointment Apply topically as needed for moderate pain (Patient not taking: Reported on 10/29/2018) 50 g 1     Methocarbamol (ROBAXIN PO)        omega 3 1000 MG CAPS Take 1 g by mouth daily 90 capsule        Medical History: any changes in medical history since they were last seen? no    Review of Systems:  The 14 system ROS was reviewed from the intake  "questionnaire, and is positive for: headache, joint pain, back pain, anxiety ans stress  Any bowel or bladder problems: none  Mood: \"okay\"    Physical Exam:  Blood pressure 110/70, pulse 81, weight 90.7 kg (200 lb), SpO2 94 %.  General: no acute distress, pleasant, sitting comfortably   Gait: antalgic gait  MSK: full strength but with some guarding due to pain    Imaging:  Thoracic MRI 3/23/2018:       Lumbar MRI completed on 4/24/17:  IMPRESSION:  1. L5-S1 small caudally dissecting 0.3 cm disc extrusion slightly to  the left of midline without apparent neural impingement or stenosis.  2. L4-L5 small central disc protrusion, without apparent neural  impingement or central stenosis, and mild bilateral foraminal  stenosis.    Cervical spine MRI completed on 11/14/14:  CONCLUSION:  1. Moderate left foraminal stenosis at C5-C6.  2. Other relatively minor degenerative changes, no further high-grade central canal or foraminal stenosis.     Right shoulder x-ray completed on 4/2/15:  FINDINGS: No evidence of fracture or dislocation. Normal glenohumeral joint alignment without evidence of degenerative changes. No evidence of significant acromioclavicular joint degenerative joint disease. No acromial or coracoid enthesophyte. Mild undersurface acromial sclerosis. Downslope type three acromion. No blastic or lytic lesions.  IMPRESSION:  Right shoulder type three acromion otherwise within normal limits.     [place] Board [employer] Pharmacy Data Base Reviewed: YES; as expected, no concern for abuse or misuse    Assessment:   1. Chronic pain syndrome - widespread pain  2. Opioid dependence - uncomplicated  3. Chronic low back pain.   S/P Right L4-L5 hemilaminectomy partial facetectomy in May 2015 with Dr. Rios.    4. Chronic neck pain. Cervical facet arthropathy.  5. Chronic headaches. Likely related to facet arthropathy, psychogenic, and rebound (excedrin)  6. Anxiety with panic attacks. Takes lexapro and clonazepam. Stable.  7. " Benzodiazepine dependence - has been on Clonazepam for about 10 years without any abuse or misuse.  Plan is to taper off this over the next couple years secondary to side effects of sedation and memory loss.  His PCP is managing this taper.   8. Tobacco use disorder - currently wearing a patch and working on quiting   9. Chronic bilateral knee pain. Left knee meniscus repair on 11/28/16 with Dr. Kaufman.  Right knee scope 9/20/2018   10. Thoracic myofascial pain - normal MRI  11. Chemical coping.     Plan:  1. Pain Psychology - YES, continue with Teresa Toledo at the [place] location  2. PT - YES - plans to schedule at [facility]    3. Medication Management:    1. Opioid agreement - 5/04/2018 -  repeat UDS TOX 13 at every visit   2. Suboxone 8mg films - decrease from TID to BID  #2 refills given   4. Labs - CRP, ESR, RF were reviewed, all normal  5. OTHER: If he does decide to proceed with surgery the jeanine-operatiove plan is to discontinue Suboxone 6 days prior to having his lumbar fusion.  I will give him Oxycodone during the time period between discontinuing and having his surgery.  Recommend regional anesthesia and intraoperative ketamine gtt.  Dr. Rios will need to manage his post operative pain.  When this is controlled we will get him back on his suboxone with a home induction  5. Follow up with Dr. Vallecillo - 3 months - beginning of February 2019      Total time spent was 30 minutes, and more than 50% of face to face time was spent in counseling and/or coordination of care regarding the above assessment and plan.    Reba Vallecillo MD [facility]  [facility]

## 2018-11-08 ENCOUNTER — OFFICE VISIT (OUTPATIENT)
Dept: PALLIATIVE MEDICINE | Facility: CLINIC | Age: 43
End: 2018-11-08
Payer: COMMERCIAL

## 2018-11-08 VITALS
OXYGEN SATURATION: 94 % | WEIGHT: 200 LBS | HEART RATE: 81 BPM | SYSTOLIC BLOOD PRESSURE: 110 MMHG | DIASTOLIC BLOOD PRESSURE: 70 MMHG | BODY MASS INDEX: 28.29 KG/M2

## 2018-11-08 DIAGNOSIS — Z79.899 ENCOUNTER FOR LONG-TERM (CURRENT) USE OF HIGH-RISK MEDICATION: ICD-10-CM

## 2018-11-08 DIAGNOSIS — F11.20 UNCOMPLICATED OPIOID DEPENDENCE (H): Primary | ICD-10-CM

## 2018-11-08 DIAGNOSIS — F11.20 CONTINUOUS OPIOID DEPENDENCE (H): ICD-10-CM

## 2018-11-08 LAB
AMPHETAMINES UR QL: NOT DETECTED NG/ML
BARBITURATES UR QL SCN: NOT DETECTED NG/ML
BENZODIAZ UR QL SCN: NOT DETECTED NG/ML
BUPRENORPHINE UR QL: ABNORMAL NG/ML
CANNABINOIDS UR QL: NOT DETECTED NG/ML
COCAINE UR QL SCN: NOT DETECTED NG/ML
D-METHAMPHET UR QL: NOT DETECTED NG/ML
METHADONE UR QL SCN: NOT DETECTED NG/ML
OPIATES UR QL SCN: NOT DETECTED NG/ML
OXYCODONE UR QL SCN: NOT DETECTED NG/ML
PCP UR QL SCN: NOT DETECTED NG/ML
PROPOXYPH UR QL: NOT DETECTED NG/ML
TRICYCLICS UR QL SCN: NOT DETECTED NG/ML

## 2018-11-08 PROCEDURE — 99214 OFFICE O/P EST MOD 30 MIN: CPT | Performed by: ANESTHESIOLOGY

## 2018-11-08 PROCEDURE — 80306 DRUG TEST PRSMV INSTRMNT: CPT | Performed by: ANESTHESIOLOGY

## 2018-11-08 RX ORDER — BUPRENORPHINE AND NALOXONE 8; 2 MG/1; MG/1
1 FILM, SOLUBLE BUCCAL; SUBLINGUAL 2 TIMES DAILY
Qty: 60 FILM | Refills: 2 | Status: SHIPPED | OUTPATIENT
Start: 2018-11-08 | End: 2019-01-31

## 2018-11-08 NOTE — PATIENT INSTRUCTIONS
Continue Suboxone - we are decreasing your dose to 8mg films twice daily.  I gave you two refills    Follow up in 3 months: February 1st, 2019     Reba Vallecillo MD     ----------------------------------------------------------------  Clinic Number:  [phone]   Call this number with any questions about your care and for scheduling assistance. Calls are returned Monday through Friday between 8 AM and 4:30 PM. We usually get back to you within 2 business days depending on the issue/request.       Medication refills:    For non-narcotic medications, call your pharmacy directly to request a refill. The pharmacy will contact the [facility] for authorization. Please allow 3-4 days for these refills to be processed.     For narcotic refills, call the clinic number or send a [employer] message. Please contact us 7-10 days before your refill is due. The message MUST include the name of the specific medication(s) requested and how you would like to receive the prescription(s). The options are as follows:    Pain Clinic staff can mail the prescription to your pharmacy. Please tell us the name of the pharmacy.    You may pick the prescription up at the Pain Clinic (tell us the location) or during a clinic visit with your pain provider    Pain Clinic staff can deliver the prescription to the [place] pharmacy in the clinic building. Please tell us the location.      We believe regular attendance is key to your success in our program.    Any time you are unable to keep your appointment we ask that you call us at least 24 hours in advance to let us know. This will allow us to offer the appointment time to another patient.

## 2018-11-08 NOTE — MR AVS SNAPSHOT
After Visit Summary   11/8/2018    Diego Meng    MRN: [id number]           Patient Information     Date Of Birth          1975        Visit Information        Provider Department      11/8/2018 8:00 AM Reba Vallecillo MD [facility]        Today's Diagnoses     Uncomplicated opioid dependence (H)    -  1      Care Instructions    Continue Suboxone - we are decreasing your dose to 8mg films twice daily.  I gave you two refills    Follow up in 3 months: February 1st, 2019     Reba Vallecillo MD     ----------------------------------------------------------------  Clinic Number:  [phone]   Call this number with any questions about your care and for scheduling assistance. Calls are returned Monday through Friday between 8 AM and 4:30 PM. We usually get back to you within 2 business days depending on the issue/request.       Medication refills:    For non-narcotic medications, call your pharmacy directly to request a refill. The pharmacy will contact the [facility] for authorization. Please allow 3-4 days for these refills to be processed.     For narcotic refills, call the clinic number or send a [employer] message. Please contact us 7-10 days before your refill is due. The message MUST include the name of the specific medication(s) requested and how you would like to receive the prescription(s). The options are as follows:    Pain Clinic staff can mail the prescription to your pharmacy. Please tell us the name of the pharmacy.    You may pick the prescription up at the Pain Clinic (tell us the location) or during a clinic visit with your pain provider    Pain Clinic staff can deliver the prescription to the [place] pharmacy in the clinic building. Please tell us the location.      We believe regular attendance is key to your success in our program.    Any time you are unable to keep your appointment we ask that you call us at least 24 hours in advance to let us  know. This will allow us to offer the appointment time to another patient.               Follow-ups after your visit        Your next 10 appointments already scheduled     Nov 30, 2018  7:10 AM CST   SHORT with Yogesh Mcfadden MD   [facility] ([facility])    [address] [place] [address]  [place] [address] [phone]              Who to contact     If you have questions or need follow up information about today's clinic visit or your schedule please contact [facility] directly at [phone].  Normal or non-critical lab and imaging results will be communicated to you by TickTickTicketshart, letter or phone within 4 business days after the clinic has received the results. If you do not hear from us within 7 days, please contact the clinic through [employer] or phone. If you have a critical or abnormal lab result, we will notify you by phone as soon as possible.  Submit refill requests through [employer] or call your pharmacy and they will forward the refill request to us. Please allow 3 business days for your refill to be completed.          Additional Information About Your Visit        MyChart Information     [employer] gives you secure access to your electronic health record. If you see a primary care provider, you can also send messages to your care team and make appointments. If you have questions, please call your primary care clinic.  If you do not have a primary care provider, please call [phone] and they will assist you.        Care EveryWhere ID     This is your Care EveryWhere ID. This could be used by other organizations to access your [facility] medical records  [id number]        Your Vitals Were     Pulse Pulse Oximetry BMI (Body Mass Index)             81 94% 28.29 kg/m2          Blood Pressure from Last 3 Encounters:   11/08/18 110/70   10/29/18 120/78   10/29/18 130/76    Weight from Last 3 Encounters:   11/08/18 90.7 kg (200 lb)   10/29/18 90.7  kg (200 lb)   10/29/18 94 kg (207 lb 3.2 oz)              Today, you had the following     No orders found for display         Today's Medication Changes          These changes are accurate as of 11/8/18  8:32 AM.  If you have any questions, ask your nurse or doctor.               These medicines have changed or have updated prescriptions.        Dose/Directions    * buprenorphine HCl-naloxone HCl 8-2 MG per film   Commonly known as:  SUBOXONE   This may have changed:  Another medication with the same name was added. Make sure you understand how and when to take each.   Used for:  Chronic pain syndrome, Continuous opioid dependence (H)   Changed by:  Reba Vallecillo MD        Dose:  1 Film   Place 1 Film under the tongue every 8 hours Ok to dispense and start on 10/29/18   Quantity:  27 Film   Refills:  0       * buprenorphine HCl-naloxone HCl 8-2 MG per film   Commonly known as:  SUBOXONE   This may have changed:  You were already taking a medication with the same name, and this prescription was added. Make sure you understand how and when to take each.   Used for:  Uncomplicated opioid dependence (H)   Changed by:  Reba Vallecillo MD        Dose:  1 Film   Place 1 Film under the tongue 2 times daily Okay to fill and start today, 11/8/2018   Quantity:  60 Film   Refills:  2       * Notice:  This list has 2 medication(s) that are the same as other medications prescribed for you. Read the directions carefully, and ask your doctor or other care provider to review them with you.         Where to get your medicines      Some of these will need a paper prescription and others can be bought over the counter.  Ask your nurse if you have questions.     Bring a paper prescription for each of these medications     buprenorphine HCl-naloxone HCl 8-2 MG per film                Primary Care Provider Fax #    Physician No Ref-Primary [phone]       No address on file        Equal Access to Services     PA DUMONT AH: Olga  em Eaton, wavikda luwilliamadaha, qaybta kaalchiara pattiphyllis, waxneida jeri ferreirashashihumberto alexander miko. So [facility] [phone].    ATENCIÓN: Si habla español, tiene a yanez disposición servicios gratuitos de asistencia lingüística. Huey al [phone].    We comply with applicable federal civil rights laws and [place] laws. We do not discriminate on the basis of race, color, national origin, age, disability, sex, sexual orientation, or gender identity.            Thank you!     Thank you for choosing [facility]  for your care. Our goal is always to provide you with excellent care. Hearing back from our patients is one way we can continue to improve our services. Please take a few minutes to complete the written survey that you may receive in the mail after your visit with us. Thank you!             Your Updated Medication List - Protect others around you: Learn how to safely use, store and throw away your medicines at www.disposemymeds.org.          This list is accurate as of 11/8/18  8:32 AM.  Always use your most recent med list.                   Brand Name Dispense Instructions for use Diagnosis    atorvastatin 40 MG tablet    LIPITOR    90 tablet    Take 1 tablet (40 mg) by mouth daily    Hyperlipidemia LDL goal <160       azithromycin 250 MG tablet    ZITHROMAX    6 tablet    Two tablets first day, then one tablet daily for four days.    Pneumonia of both lungs due to infectious organism, unspecified part of lung       * buprenorphine HCl-naloxone HCl 8-2 MG per film    SUBOXONE    27 Film    Place 1 Film under the tongue every 8 hours Ok to dispense and start on 10/29/18    Chronic pain syndrome, Continuous opioid dependence (H)       * buprenorphine HCl-naloxone HCl 8-2 MG per film    SUBOXONE    60 Film    Place 1 Film under the tongue 2 times daily Okay to fill and start today, 11/8/2018    Uncomplicated opioid dependence (H)       clonazePAM 1 MG tablet    klonoPIN    48 tablet     25 tabs first two weeks (avg 1.8 tabs/day), 23 tabs second two weeks (avg 1.6 tabs/day)    Panic attack, BRIDGER (generalized anxiety disorder)       escitalopram 20 MG tablet    LEXAPRO    90 tablet    Take 1 tablet (20 mg) by mouth daily    BRIDGER (generalized anxiety disorder)       esomeprazole 20 MG CR capsule    nexIUM    30 capsule    Take 1 capsule (20 mg) by mouth every morning (before breakfast) Take 30-60 minutes before eating.        lidocaine 5 % ointment    XYLOCAINE    50 g    Apply topically as needed for moderate pain    Tear of medial meniscus of right knee, current, unspecified tear type, subsequent encounter       omega 3 1000 MG Caps     90 capsule    Take 1 g by mouth daily        ROBAXIN PO           * Notice:  This list has 2 medication(s) that are the same as other medications prescribed for you. Read the directions carefully, and ask your doctor or other care provider to review them with you.

## 2019-01-28 DIAGNOSIS — E78.5 HYPERLIPIDEMIA LDL GOAL <160: ICD-10-CM

## 2019-01-28 DIAGNOSIS — F41.1 GAD (GENERALIZED ANXIETY DISORDER): ICD-10-CM

## 2019-01-28 NOTE — TELEPHONE ENCOUNTER
Patient is not an EP patient.  Routing to [facility].    TARI MarieeN, RN  [employer] Triage

## 2019-01-28 NOTE — TELEPHONE ENCOUNTER
"Requested Prescriptions   Pending Prescriptions Disp Refills     escitalopram (LEXAPRO) 20 MG tablet  Last Written Prescription Date:  01/05/2018  Last Fill Quantity: 90 tablet,  # refills: 3   Last office visit: 10/29/2018 with prescribing provider:  Yogesh Mcfadden MD    Future Office Visit:   Next 5 appointments (look out 90 days)    Jan 31, 2019  8:00 AM CST  Return Visit with Reba Vallecillo MD  [facility] ([facility]) [address] [address] [address]  [phone]          90 tablet 3     Sig: Take 1 tablet (20 mg) by mouth daily    SSRIs Protocol Passed - 1/28/2019 10:44 AM       Passed - Recent (12 mo) or future (30 days) visit within the authorizing provider's specialty    Patient had office visit in the last 12 months or has a visit in the next 30 days with authorizing provider or within the authorizing provider's specialty.  See \"Patient Info\" tab in inbasket, or \"Choose Columns\" in Meds & Orders section of the refill encounter.             Passed - Medication is active on med list       Passed - Patient is age 18 or older          "

## 2019-01-29 RX ORDER — ATORVASTATIN CALCIUM 40 MG/1
40 TABLET, FILM COATED ORAL DAILY
Qty: 30 TABLET | Refills: 0 | Status: SHIPPED | OUTPATIENT
Start: 2019-01-29

## 2019-01-29 NOTE — TELEPHONE ENCOUNTER
[facility]    Thanks  Paul CURRAN  Team Coodinator

## 2019-01-29 NOTE — TELEPHONE ENCOUNTER
"30 day supply given.  Patient is due for yearly physical and lab work as well as follow up regarding Anxiety.  Please call and assist with scheduling appointment prior to next refill   Patricia GONZALEZ RN - Triage  [facility]      Requested Prescriptions   Pending Prescriptions Disp Refills     atorvastatin (LIPITOR) 40 MG tablet 90 tablet 3    Last Written Prescription Date:  1/5/2018  Last Fill Quantity: 90,  # refills: 3   Last office visit: 10/29/2018 with prescribing provider:  Bogdan   Future Office Visit:   Next 5 appointments (look out 90 days)    Jan 31, 2019  8:00 AM CST  Return Visit with Reba Vallecillo MD  [facility] ([facility]) [address] [facility]  [address] [place], [place] [id number]  [phone]          Sig: Take 1 tablet (40 mg) by mouth daily    Statins Protocol Failed - 1/28/2019  2:26 PM       Failed - LDL on file in past 12 months    Recent Labs   Lab Test 01/05/18  0906   *            Failed - Recent (12 mo) or future (30 days) visit within the authorizing provider's specialty    Patient had office visit in the last 12 months or has a visit in the next 30 days with authorizing provider or within the authorizing provider's specialty.  See \"Patient Info\" tab in inbasket, or \"Choose Columns\" in Meds & Orders section of the refill encounter.             Passed - No abnormal creatine kinase in past 12 months    No lab results found.            Passed - Medication is active on med list       Passed - Patient is age 18 or older          "

## 2019-01-30 RX ORDER — ESCITALOPRAM OXALATE 20 MG/1
20 TABLET ORAL DAILY
Qty: 90 TABLET | Refills: 3 | OUTPATIENT
Start: 2019-01-30

## 2019-01-30 NOTE — TELEPHONE ENCOUNTER
"Per 11/5  message from patient: \"Effective immediately I would like to stop my care plan with Dr Yogesh Mcfadden.     I am going in a different direction at this time with a primary care physician at [facility].\"    I sent a note to the pharmacy to request refills form patient's new PCP as patient is no longer under our care.  I notes this on the demographics.    Elizabeth Oleary RN  Message handled by Nurse Triage.      "

## 2019-01-30 NOTE — PROGRESS NOTES
"                          [facility]    Date of visit: 1/31/2019    Chief complaint:   Chief Complaint   Patient presents with     Pain     Interval history:  Diego Meng is a 43 year old male last seen by me for follow up on 11/08/18.      Brief history: 43 YEAR OLD MALE who began having low back pain at age 20.  Pain continued and progressed to neck and right shoulder pain for the last 20 years.  History of high dose opioids after having a L4-5 decompression at [facility] by Dr. Rios. Dose escalated and got \"out of control\".  He admits to overuse.  He was transitioned to buprenorphine by Dr. Almonte and was on very high dose 32mg when he was transferred to myself for ongoing care. PMHx is significant for anxiety.     Since his last visit, Diego Meng reports:  - Doing well  - Working long hours as an  in a firm downtown.  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    -  with one daughter (8).   - Pain has been about the same.  We decreased his Suboxone from 3 films daily to 2 films daily and he did not notice any difference.   - Not really participating in multidisciplinary care.  He saw Teresa Toledo - pain psychology on 7/11/2018.  Did not follow up  - He was tapering off his Klonopin with his PCP, Dr. Son, and having a hard time with the taper.  He went and found an new PCP at [facility], Dr. Francis,  Who has agreed to continue to prescribe this for him.  He has been on this for over 10 years and is willing to continue to taper but would like to do it slower. He is currently on 2mg Klonopin/day and wants to stay on this for awhile.  He was on 4mg daily 6 months ago. .   - Dr. Rios has recommended anterior L4-S1 fusion with pelvic flixation for SI joint dysfunction.  2 night hospital stay, however, he is putting a hold on that for now.   - S/P Right knee scope 9/25/2018 with Dr. Kaufman   - Thoracic back pain " is improved.  He has figured out what makes it hurt - twisting.   - Left foot pain - plantar fascitis.  Resolved completely  - He has a torn medial meniscus in his left knee and thinks he will likely need surgery for that as well. He had a knee injection a month ago which was not helpful.   - S/P Thoracic TRIGGER POINT INJECTION which was not helpful  -He has had many different injections over the years none of which were helpful for more than a few days.    -His headaches are unchanged. They start at the back of his head/neck and radiate through his head to his eyes.  He attributes his headaches to rebound and has a very difficult time discontinuing excedrin.     Pain scores:  Pain intensity on average is 5 (5 last visit, 7 prior) on a scale of 0-10.     Current pain treatments:   Suboxone 8-2 mg #2/day  Excedrin #2-6/day for 20 years  lexapro 20 mg daily  Clonazepam 1 mg #2/day  Robaxin 500-1000mg PRN QID    Labs: CRP <2.9, RF <20, ESR 6    Side Effects: no side effects    Past pain treatments:  Diegomelchor Meng has been seen at a pain clinic in the past. [facility] Dr. Vázquez and [facility]  Medications:     Medrol dose carolin -no relief              botox -no relief              Anti-migraine: fioricet, migranal, eletriptan, frovatriptan, midrin, naraptriptan, sumatriptan, zolmitripatn -no relief from any of these              Anti-convulsants: depakote, topiramate, gabapentin, lyrica -all of these made him feel fuzzy and forgetful and they were not helpful              Opioids: oxycontin, morphine -no relief, hydrocodone -no relief, tramadol -no relief                Muscle relaxants: soma -no relief, flexeril -no relief and made him tired, norflex -no relief, tizanidine -no relief and made him tired              NSAIDs: allergic to this class of medications -rash, abdominal pain and cramping, toradol -upset stomach              Anti-depressants: cymbalta -no relief                Topicals:  lidocaine -no relief  PT: [facility] in 2016 -helped for shoulder but flared up back and neck  Psychology: yes for pain at the [facility] -'continues to use these skills'  Acupuncture: tried it -no relief  Chiropractic care: tried it -helped initially but then flared up pain  TENS Unit: no relief  Injections: R L4 and L5 TF BAY 12/24/16 -flared pain, cervical epidural steroid injection -provided 2-4 weeks of relief but had significant flare up for 2 weeks post-procedure, cervical trigger point injections -minimal relief, lumbar medial branch block -no relief, lumbar paraspinal trigger point injection 5/17/17 - not helpful, Thoracic TRIGGER POINT INJECTION 7/27/2018.   Surgery: right L4-L5 hemilaminectomy May 2015 Dr. Rios, Right knee scope 9/25/2018    Medications:  Current Outpatient Medications   Medication Sig Dispense Refill     atorvastatin (LIPITOR) 40 MG tablet Take 1 tablet (40 mg) by mouth daily 30 tablet 0     azithromycin (ZITHROMAX) 250 MG tablet Two tablets first day, then one tablet daily for four days. 6 tablet 0     buprenorphine HCl-naloxone HCl (SUBOXONE) 8-2 MG per film Place 1 Film under the tongue 2 times daily Okay to fill and start today, 11/8/2018 60 Film 2     buprenorphine HCl-naloxone HCl (SUBOXONE) 8-2 MG per film Place 1 Film under the tongue every 8 hours Ok to dispense and start on 10/29/18 27 Film 0     clonazePAM (KLONOPIN) 1 MG tablet 25 tabs first two weeks (avg 1.8 tabs/day), 23 tabs second two weeks (avg 1.6 tabs/day) 48 tablet 0     escitalopram (LEXAPRO) 20 MG tablet Take 1 tablet (20 mg) by mouth daily 90 tablet 3     esomeprazole (NEXIUM) 20 MG capsule Take 1 capsule (20 mg) by mouth every morning (before breakfast) Take 30-60 minutes before eating. 30 capsule 0     lidocaine (XYLOCAINE) 5 % ointment Apply topically as needed for moderate pain 50 g 1     Methocarbamol (ROBAXIN PO)        omega 3 1000 MG CAPS Take 1 g by mouth daily 90  "capsule        Medical History: any changes in medical history since they were last seen? no    Review of Systems:  The 14 system ROS was reviewed from the intake questionnaire, and is positive for: anxiety and stress  Any bowel or bladder problems: none  Mood: \"okay\"    Physical Exam:  Blood pressure 137/89, pulse 87, SpO2 97 %.  General: no acute distress, pleasant, sitting comfortably   Gait: antalgic gait  MSK: full strength but with some guarding due to pain    Imaging:  Thoracic MRI 3/23/2018:       Lumbar MRI completed on 4/24/17:  IMPRESSION:  1. L5-S1 small caudally dissecting 0.3 cm disc extrusion slightly to  the left of midline without apparent neural impingement or stenosis.  2. L4-L5 small central disc protrusion, without apparent neural  impingement or central stenosis, and mild bilateral foraminal  stenosis.    Cervical spine MRI completed on 11/14/14:  CONCLUSION:  1. Moderate left foraminal stenosis at C5-C6.  2. Other relatively minor degenerative changes, no further high-grade central canal or foraminal stenosis.     Right shoulder x-ray completed on 4/2/15:  FINDINGS: No evidence of fracture or dislocation. Normal glenohumeral joint alignment without evidence of degenerative changes. No evidence of significant acromioclavicular joint degenerative joint disease. No acromial or coracoid enthesophyte. Mild undersurface acromial sclerosis. Downslope type three acromion. No blastic or lytic lesions.  IMPRESSION:  Right shoulder type three acromion otherwise within normal limits.     [place] Board of Pharmacy Data Base Reviewed: YES; as expected, no concern for abuse or misuse    Assessment:   1. Chronic pain syndrome - widespread pain  2. Opioid dependence - uncomplicated  3. Chronic low back pain.   S/P Right L4-L5 hemilaminectomy partial facetectomy in May 2015 with Dr. Rios.    4. Chronic neck pain. Cervical facet arthropathy.  5. Chronic headaches. Likely related to facet arthropathy, " psychogenic, and rebound (excedrin)  6. Anxiety with panic attacks. Takes lexapro and clonazepam. Stable.  7. Benzodiazepine dependence - has been on Clonazepam for about 10 years without any abuse or misuse.  Plan is to taper off this over the next couple years secondary to side effects of sedation and memory loss.  His PCP is managing this taper.   8. Tobacco use disorder - currently wearing a patch and working on quiting   9. Chronic bilateral knee pain. Left knee meniscus repair on 11/28/16 with Dr. Kaufman.  Right knee scope 9/20/2018   10. Thoracic myofascial pain - normal MRI  11. Chemical coping.     Plan:  1. Pain Psychology - Recommended, however, the patient went to initial visit and no follow up.  If he continues to struggle to cope with his pain I will recommend that he start to see Marilynn here at the  location.  This should be easier for him to get to with his busy work schedule.   2. PT - YES - plans to schedule at [facility], however, has not done this  3. Medication Management:    1. Opioid agreement - 5/04/2018 -  repeat UDS TOX 13 at every visit   2. Suboxone 8mg films -continue  BID  #2 refills given   4. Labs - CRP, ESR, RF , all normal  5. OTHER: If he does decide to proceed with surgery the jeanine-operatiove plan is to discontinue Suboxone 6 days prior to having his lumbar fusion.  I will give him Oxycodone during the time period between discontinuing and having his surgery.  Recommend regional anesthesia and intraoperative ketamine gtt.  Dr. Rios will need to manage his post operative pain.  When this is controlled we will get him back on his suboxone with a home induction  5. Follow up with Dr. Vallecillo - 3 months       Total time spent was 30 minutes, and more than 50% of face to face time was spent in counseling and/or coordination of care regarding the above assessment and plan.    Reba Vallecillo MD [facility]  [facility]

## 2019-01-31 ENCOUNTER — MYC MEDICAL ADVICE (OUTPATIENT)
Dept: ADDICTION MEDICINE | Facility: CLINIC | Age: 44
End: 2019-01-31

## 2019-01-31 ENCOUNTER — OFFICE VISIT (OUTPATIENT)
Dept: PALLIATIVE MEDICINE | Facility: CLINIC | Age: 44
End: 2019-01-31
Payer: COMMERCIAL

## 2019-01-31 VITALS — OXYGEN SATURATION: 97 % | HEART RATE: 87 BPM | SYSTOLIC BLOOD PRESSURE: 137 MMHG | DIASTOLIC BLOOD PRESSURE: 89 MMHG

## 2019-01-31 DIAGNOSIS — F13.20 BENZODIAZEPINE DEPENDENCE, CONTINUOUS (H): ICD-10-CM

## 2019-01-31 DIAGNOSIS — F11.20 CONTINUOUS OPIOID DEPENDENCE (H): Primary | ICD-10-CM

## 2019-01-31 DIAGNOSIS — F41.1 GAD (GENERALIZED ANXIETY DISORDER): ICD-10-CM

## 2019-01-31 DIAGNOSIS — G89.4 CHRONIC PAIN SYNDROME: ICD-10-CM

## 2019-01-31 DIAGNOSIS — F17.200 TOBACCO USE DISORDER: ICD-10-CM

## 2019-01-31 DIAGNOSIS — F33.2 SEVERE EPISODE OF RECURRENT MAJOR DEPRESSIVE DISORDER, WITHOUT PSYCHOTIC FEATURES (H): ICD-10-CM

## 2019-01-31 DIAGNOSIS — F11.20 CONTINUOUS OPIOID DEPENDENCE (H): ICD-10-CM

## 2019-01-31 DIAGNOSIS — Z79.899 ENCOUNTER FOR LONG-TERM (CURRENT) USE OF HIGH-RISK MEDICATION: ICD-10-CM

## 2019-01-31 DIAGNOSIS — F11.20 UNCOMPLICATED OPIOID DEPENDENCE (H): ICD-10-CM

## 2019-01-31 PROCEDURE — 80306 DRUG TEST PRSMV INSTRMNT: CPT | Performed by: ANESTHESIOLOGY

## 2019-01-31 PROCEDURE — 99214 OFFICE O/P EST MOD 30 MIN: CPT | Performed by: ANESTHESIOLOGY

## 2019-01-31 RX ORDER — BUPRENORPHINE AND NALOXONE 8; 2 MG/1; MG/1
1 FILM, SOLUBLE BUCCAL; SUBLINGUAL 2 TIMES DAILY
Qty: 60 FILM | Refills: 2 | Status: SHIPPED | OUTPATIENT
Start: 2019-01-31 | End: 2019-05-29

## 2019-01-31 ASSESSMENT — PAIN SCALES - GENERAL: PAINLEVEL: MODERATE PAIN (5)

## 2019-01-31 NOTE — TELEPHONE ENCOUNTER
"Requested Prescriptions   Pending Prescriptions Disp Refills     escitalopram (LEXAPRO) 20 MG tablet    Last Written Prescription Date:  1/5/2018  Last Fill Quantity: 90,  # refills: 3   Last office visit: 10/29/2018 with prescribing provider:  No Ref-Primary, Physician     Future Office Visit:   Next 5 appointments (look out 90 days)    Apr 25, 2019  8:00 AM CDT  Return Visit with Reba Vallecillo MD  [facility] ([facility]) [address] [address] [address]  [phone]          90 tablet 3     Sig: Take 1 tablet (20 mg) by mouth daily    SSRIs Protocol Failed - 1/31/2019  4:32 PM       Failed - Recent (12 mo) or future (30 days) visit within the authorizing provider's specialty    Patient had office visit in the last 12 months or has a visit in the next 30 days with authorizing provider or within the authorizing provider's specialty.  See \"Patient Info\" tab in inbasket, or \"Choose Columns\" in Meds & Orders section of the refill encounter.             Passed - Medication is active on med list       Passed - Patient is age 18 or older          "

## 2019-01-31 NOTE — TELEPHONE ENCOUNTER
My chart sent from patient:  INFORMATION NOTED    Mike Britton,  I found a whole unopened box of suboxone... so no issues with the fill date.     Thanks for your help!    Diego MACIAS-RN Care Coordinator  [facility]

## 2019-02-05 RX ORDER — ESCITALOPRAM OXALATE 20 MG/1
20 TABLET ORAL DAILY
Qty: 90 TABLET | Refills: 3 | OUTPATIENT
Start: 2019-02-05

## 2019-02-05 NOTE — TELEPHONE ENCOUNTER
Patient has established care with alternate provider. Pharmacy notified to send request to [facility].    Lorri Penn RN

## 2019-04-24 NOTE — PROGRESS NOTES
"                          [facility]    Date of visit: 4/24/2019    Chief complaint:   Chief Complaint   Patient presents with     Pain     Interval history:  Diego Meng is a 43 year old male last seen by me for follow up on 1/31/2019.      Brief history: 43 YEAR OLD MALE who began having low back pain at age 20.  Pain continued and progressed to neck and right shoulder pain for the last 20 years.  History of high dose opioids after having a L4-5 decompression at [facility] by Dr. Rios. Dose escalated and got \"out of control\".  He admits to overuse.  He was transitioned to buprenorphine by Dr. Almonte and was on very high dose 32mg when he was transferred to myself for ongoing care. PMHx is significant for anxiety.     Since his last visit, Diego Meng reports:  - Doing well  - Working long hours as an  in a firm downtown.  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    -  with one daughter (8).   - Pain has been about the same.  We decreased his Suboxone from 3 films daily to 2 films daily and he did not notice any difference.   - He tried to get down to 8mg daily and his pain escalated out of control.   - Not really participating in multidisciplinary care.  He saw [facility] - pain psychology on 7/11/2018.  Did not follow up  - He was tapering off his Klonopin with his PCP, Dr. Son, and having a hard time with the taper.  He went and found an new PCP at [facility], Dr. Francis,  Who has agreed to continue to prescribe this for him.  He has been on this for over 10 years and is willing to continue to taper but would like to do it slower. He is currently on 2mg Klonopin/day and wants to stay on this for awhile.  He was previously on 4mg daily. He knows this is making him feel tired and sedated.    - Dr. Rios has recommended anterior L4-S1 fusion with pelvic flixation for SI joint dysfunction.  2 night hospital " stay, however, he is putting a hold on that for now.   - S/P Right knee scope 9/25/2018 with Dr. Kaufman   - Thoracic back pain is improved.  He has figured out what makes it hurt - twisting.   - Left foot pain - plantar fascitis.  Resolved completely  - He has a torn medial meniscus in his left knee and thinks he will likely need surgery for that as well. He had a knee injection a month ago which was not helpful.   - S/P Thoracic TRIGGER POINT INJECTION which was not helpful  -He has had many different injections over the years none of which were helpful for more than a few days.    -His headaches are unchanged. They start at the back of his head/neck and radiate through his head to his eyes.  He attributes his headaches to rebound and has a very difficult time discontinuing excedrin.     Pain scores:  Pain intensity on average is 6 (5 last visit) on a scale of 0-10.     Current pain treatments:   Suboxone 8-2 mg #2/day  Excedrin #2-6/day for 20 years  lexapro 20 mg daily  Clonazepam 1 mg #2/day  Robaxin 500-1000mg PRN QID    Labs: CRP <2.9, RF <20, ESR 6    Side Effects: no side effects    Past pain treatments:  Diego Meng has been seen at a pain clinic in the past. [facility] Dr. Vázquez and [facility]  Medications:     Medrol dose carolin -no relief              botox -no relief              Anti-migraine: fioricet, migranal, eletriptan, frovatriptan, midrin, naraptriptan, sumatriptan, zolmitripatn -no relief from any of these              Anti-convulsants: depakote, topiramate, gabapentin, lyrica -all of these made him feel fuzzy and forgetful and they were not helpful              Opioids: oxycontin, morphine -no relief, hydrocodone -no relief, tramadol -no relief                Muscle relaxants: soma -no relief, flexeril -no relief and made him tired, norflex -no relief, tizanidine -no relief and made him tired              NSAIDs: allergic to this class of medications -rash, abdominal  pain and cramping, toradol -upset stomach              Anti-depressants: cymbalta -no relief                Topicals: lidocaine -no relief  PT: [facility] in 2016 -helped for shoulder but flared up back and neck  Psychology: yes for pain at the [facility] -'continues to use these skills'  Acupuncture: tried it -no relief  Chiropractic care: tried it -helped initially but then flared up pain  TENS Unit: no relief  Injections: R L4 and L5 TF BAY 12/24/16 -flared pain, cervical epidural steroid injection -provided 2-4 weeks of relief but had significant flare up for 2 weeks post-procedure, cervical trigger point injections -minimal relief, lumbar medial branch block -no relief, lumbar paraspinal trigger point injection 5/17/17 - not helpful, Thoracic TRIGGER POINT INJECTION 7/27/2018.   Surgery: right L4-L5 hemilaminectomy May 2015 Dr. Rios, Right knee scope 9/25/2018    Medications:  Current Outpatient Medications   Medication Sig Dispense Refill     atorvastatin (LIPITOR) 40 MG tablet Take 1 tablet (40 mg) by mouth daily 30 tablet 0     buprenorphine HCl-naloxone HCl (SUBOXONE) 8-2 MG per film Place 1 Film under the tongue 2 times daily Okay to fill and start today, 1/31/2019 60 Film 2     clonazePAM (KLONOPIN) 1 MG tablet 25 tabs first two weeks (avg 1.8 tabs/day), 23 tabs second two weeks (avg 1.6 tabs/day) 48 tablet 0     escitalopram (LEXAPRO) 20 MG tablet Take 1 tablet (20 mg) by mouth daily 90 tablet 3     esomeprazole (NEXIUM) 20 MG capsule Take 1 capsule (20 mg) by mouth every morning (before breakfast) Take 30-60 minutes before eating. 30 capsule 0     lidocaine (XYLOCAINE) 5 % ointment Apply topically as needed for moderate pain (Patient not taking: Reported on 1/31/2019) 50 g 1     Methocarbamol (ROBAXIN PO)        omega 3 1000 MG CAPS Take 1 g by mouth daily 90 capsule        Medical History: any changes in medical history since they were last seen? no    Review of  "Systems:  The 14 system ROS was reviewed from the intake questionnaire, and is positive for: anxiety and stress  Any bowel or bladder problems: none  Mood: \"okay\"    Physical Exam:  Blood pressure 121/73, pulse 85, weight 93.4 kg (206 lb), SpO2 97 %.  General: no acute distress, pleasant, sitting comfortably   Gait: antalgic gait  MSK: full strength but with some guarding due to pain    Imaging:  Thoracic MRI 3/23/2018:       Lumbar MRI completed on 4/24/17:  IMPRESSION:  1. L5-S1 small caudally dissecting 0.3 cm disc extrusion slightly to  the left of midline without apparent neural impingement or stenosis.  2. L4-L5 small central disc protrusion, without apparent neural  impingement or central stenosis, and mild bilateral foraminal  stenosis.    Cervical spine MRI completed on 11/14/14:  CONCLUSION:  1. Moderate left foraminal stenosis at C5-C6.  2. Other relatively minor degenerative changes, no further high-grade central canal or foraminal stenosis.     Right shoulder x-ray completed on 4/2/15:  FINDINGS: No evidence of fracture or dislocation. Normal glenohumeral joint alignment without evidence of degenerative changes. No evidence of significant acromioclavicular joint degenerative joint disease. No acromial or coracoid enthesophyte. Mild undersurface acromial sclerosis. Downslope type three acromion. No blastic or lytic lesions.  IMPRESSION:  Right shoulder type three acromion otherwise within normal limits.     [place] Board of Pharmacy Data Base Reviewed: YES; as expected, no concern for abuse or misuse    Assessment:   1. Chronic pain syndrome - widespread pain  2. Opioid dependence - uncomplicated  3. Chronic low back pain.   S/P Right L4-L5 hemilaminectomy partial facetectomy in May 2015 with Dr. Rios.    4. Chronic neck pain. Cervical facet arthropathy.  5. Chronic headaches. Likely related to facet arthropathy, psychogenic, and rebound (excedrin)  6. Anxiety with panic attacks. Takes lexapro and " clonazepam. Stable.  7. Benzodiazepine dependence - has been on Clonazepam for about 10 years without any abuse or misuse.  Plan is to taper off this over the next couple years secondary to side effects of sedation and memory loss.  His PCP is managing this taper.   8. Tobacco use disorder - currently wearing a patch and working on quiting   9. Chronic bilateral knee pain. Left knee meniscus repair on 11/28/16 with Dr. Kaufman.  Right knee scope 9/20/2018   10. Thoracic myofascial pain - normal MRI  11. Chemical coping.     Plan:  1. Pain Psychology - Recommended, however, the patient went to initial visit and no follow up.  If he continues to struggle to cope with his pain I will recommend that he start to see Marilynn here at the  location.  This should be easier for him to get to with his busy work schedule.   2. PT - YES - plans to schedule at [facility], however, has not done this  3. Medication Management:    1. Opioid agreement - 5/04/2018 & not needed now that he is on Suboxone-  repeat UDS TOX 13 at every visit   2. Suboxone 8mg films -continue  BID  #2 refills given    3. I have agreed to give him traditional opioids for pain flares #2-3 days worth only to be taken IN ADDITION to his Suboxone.  Agreed that this should only happen a few times a year.  Not a weekly or monthly thing.  4. Labs - CRP, ESR, RF , all normal  5. OTHER: If he does decide to proceed with surgery the jeanine-operatiove plan is to discontinue Suboxone 6 days prior to having his lumbar fusion.  I will give him Oxycodone during the time period between discontinuing and having his surgery.  Recommend regional anesthesia and intraoperative ketamine gtt.  Dr. Rios will need to manage his post operative pain.  When this is controlled we will get him back on his suboxone with a home induction  5. Follow up with Dr. Vallecillo - 3 months       Total time spent was 30 minutes, and more than 50% of face to face time was spent in counseling  and/or coordination of care regarding the above assessment and plan.    Reba Vallecillo MD [facility]  [facility]

## 2019-04-25 ENCOUNTER — OFFICE VISIT (OUTPATIENT)
Dept: PALLIATIVE MEDICINE | Facility: CLINIC | Age: 44
End: 2019-04-25
Payer: COMMERCIAL

## 2019-04-25 VITALS
HEART RATE: 85 BPM | SYSTOLIC BLOOD PRESSURE: 121 MMHG | WEIGHT: 206 LBS | BODY MASS INDEX: 29.14 KG/M2 | OXYGEN SATURATION: 97 % | DIASTOLIC BLOOD PRESSURE: 73 MMHG

## 2019-04-25 DIAGNOSIS — F33.2 SEVERE EPISODE OF RECURRENT MAJOR DEPRESSIVE DISORDER, WITHOUT PSYCHOTIC FEATURES (H): ICD-10-CM

## 2019-04-25 DIAGNOSIS — M79.18 MYOFASCIAL PAIN: ICD-10-CM

## 2019-04-25 DIAGNOSIS — G89.4 CHRONIC PAIN SYNDROME: Primary | ICD-10-CM

## 2019-04-25 DIAGNOSIS — F11.20 CONTINUOUS OPIOID DEPENDENCE (H): ICD-10-CM

## 2019-04-25 DIAGNOSIS — F13.20 BENZODIAZEPINE DEPENDENCE, CONTINUOUS (H): ICD-10-CM

## 2019-04-25 DIAGNOSIS — Z79.899 ENCOUNTER FOR LONG-TERM (CURRENT) USE OF HIGH-RISK MEDICATION: ICD-10-CM

## 2019-04-25 DIAGNOSIS — F41.1 GAD (GENERALIZED ANXIETY DISORDER): ICD-10-CM

## 2019-04-25 PROBLEM — F17.200 TOBACCO USE DISORDER: Status: RESOLVED | Noted: 2018-07-27 | Resolved: 2019-04-25

## 2019-04-25 PROCEDURE — 80306 DRUG TEST PRSMV INSTRMNT: CPT | Performed by: INTERNAL MEDICINE

## 2019-04-25 PROCEDURE — 99214 OFFICE O/P EST MOD 30 MIN: CPT | Performed by: ANESTHESIOLOGY

## 2019-04-25 ASSESSMENT — PAIN SCALES - GENERAL: PAINLEVEL: SEVERE PAIN (6)

## 2019-05-22 ENCOUNTER — MYC MEDICAL ADVICE (OUTPATIENT)
Dept: ADDICTION MEDICINE | Facility: CLINIC | Age: 44
End: 2019-05-22

## 2019-05-22 DIAGNOSIS — G89.4 CHRONIC PAIN SYNDROME: Primary | ICD-10-CM

## 2019-05-22 RX ORDER — OXYCODONE AND ACETAMINOPHEN 10; 325 MG/1; MG/1
1 TABLET ORAL EVERY 6 HOURS PRN
Qty: 12 TABLET | Refills: 0 | Status: SHIPPED | OUTPATIENT
Start: 2019-05-22 | End: 2020-04-02

## 2019-05-22 NOTE — TELEPHONE ENCOUNTER
Called patient to relay message below. Advised him to disregard my chart message sent by nursing.     Tierra MARCN-RN Care Coordinator  [facility]

## 2019-05-22 NOTE — TELEPHONE ENCOUNTER
My chart sent from patient:    Suzette Cortez,   I am sorry to hear that you are having some increased pain. When did this start for you? Is it the same pain you have been having, or is it new? Have you had any injuries to the painful areas. Make sure you are incorporating self care measures as well. Using ice/heat, tylenol/ibuprofen and taking rest periods as needed can be helpful in addition to you pain medication regime. I will relay your message to Dr. Vallecillo and update her on what is going on.     Tierra MARCN-RN Care Coordinator  [facility]

## 2019-05-22 NOTE — TELEPHONE ENCOUNTER
My chart sent from patient:    Mike Britton, I have been holding off as long as I can, but I seem to be having a acute issue with the pain in my lumbar as well as my cervical spine.  I m having issues getting out of bed because the pain has been so severe the last three weeks and am really struggling.     Please advise if you could prescribe something, short term, to help me get over this hump. I would really appreciate it.     Thanks!   Diego MARCN-RN Care Coordinator  [facility]

## 2019-05-22 NOTE — TELEPHONE ENCOUNTER
I have given him a #3 day prescription for Oxycodone to be taken IN ADDITION to his suboxone.  This was sent to [facility] [place].     Reba Vallecillo MD

## 2019-05-28 ENCOUNTER — TELEPHONE (OUTPATIENT)
Dept: PALLIATIVE MEDICINE | Facility: CLINIC | Age: 44
End: 2019-05-28

## 2019-05-28 ENCOUNTER — MYC MEDICAL ADVICE (OUTPATIENT)
Dept: ADDICTION MEDICINE | Facility: CLINIC | Age: 44
End: 2019-05-28

## 2019-05-28 DIAGNOSIS — M47.812 CERVICAL SPONDYLOSIS WITHOUT MYELOPATHY: Primary | ICD-10-CM

## 2019-05-28 DIAGNOSIS — F11.20 UNCOMPLICATED OPIOID DEPENDENCE (H): ICD-10-CM

## 2019-05-28 RX ORDER — BUPRENORPHINE AND NALOXONE 8; 2 MG/1; MG/1
1 FILM, SOLUBLE BUCCAL; SUBLINGUAL 2 TIMES DAILY
Qty: 60 FILM | Refills: 2 | Status: CANCELLED | OUTPATIENT
Start: 2019-05-28

## 2019-05-28 NOTE — TELEPHONE ENCOUNTER
Called patient and left generic VM relaying message below.     Tierra MARCN-RN Care Coordinator  [facility]

## 2019-05-28 NOTE — TELEPHONE ENCOUNTER
I saw him on 4/25/2019 and gave him a prescription for Suboxone with #2 refills on it.  He should be able to just call the pharmacy and get the refill.     Reba Vallecillo MD

## 2019-05-28 NOTE — TELEPHONE ENCOUNTER
Received call from patient requesting refill(s) of buprenorphine HCl-naloxone HCl (SUBOXONE) 8-2 MG per film     Last picked up from pharmacy on 4/27/2019    [facility] [address] [facility] [place], [place]   Phone: [phone] Fax: [phone]    Pt last seen by prescribing provider on 4/25/2019  Next appt scheduled for none     checked in the past 6 months? Yes If no, print current report and give to RN    Last urine drug screen date 4/25/2019  Current opioid agreement on file (completed within the last year) No Date of opioid agreement:     Processing (pick one and delete the others):      [facility]-pres[facility] to [facility]    Will route to nursing pool for review and preparation of prescription(s).

## 2019-05-28 NOTE — TELEPHONE ENCOUNTER
Medication refill information reviewed.     Due date for Suboxone is 5/27/19     Prescriptions prepped for review.     Will route to provider.     Tierra MACIAS-RN Care Coordinator  [facility]

## 2019-05-29 RX ORDER — BUPRENORPHINE AND NALOXONE 8; 2 MG/1; MG/1
1 FILM, SOLUBLE BUCCAL; SUBLINGUAL 2 TIMES DAILY
Qty: 60 FILM | Refills: 0 | Status: SHIPPED | OUTPATIENT
Start: 2019-05-29 | End: 2019-07-01

## 2019-05-29 NOTE — TELEPHONE ENCOUNTER
My chart sent to patient:    Dr. Avel King reviewed your messages are her response is:     I have ordered a cervical MRI.  I will need him to make a follow up in clinic so we can discuss this new pain and I can do an exam.  MRI results in and of themselves tell us very little.  I do not recall talking about neck pain in the past.  It has always been low back/mid back pain.  I also refilled his suboxone prescription and sent this to [facility].       He can call [facility] to schedule his MRI and we will talk about the results at his follow up or before if there is anything that needs addressing urgently.      [facility]:   [facility] locations: [facility] Specialty care [facility]  Call: [phone]     Critical access hospital  Call: [phone]     MD Tierra DorseyN-RN Care Coordinator  [facility]

## 2019-05-29 NOTE — TELEPHONE ENCOUNTER
I have ordered a cervical MRI.  I will need him to make a follow up in clinic so we can discuss this new pain and I can do an exam.  MRI results in and of themselves tell us very little.  I do not recall talking about neck pain in the past.  It has always been low back/mid back pain.  I also refilled his suboxone prescription and sent this to [facility].      He can call [facility] to schedule his MRI and we will talk about the results at his follow up or before if there is anything that needs addressing urgently.     [facility]:   [facility] locations: [facility] Specialty care building  Call: [phone]    [facility]  Call: [phone]    Reba Vallecillo MD

## 2019-05-29 NOTE — TELEPHONE ENCOUNTER
My chart sent from patient:    Greetings, I have been experiencing incredibly increased pain from both my lumbar and cervical spine.  But right now the cervical pain is out of control.     Question for you... can you order an MRI of my cspine? The pain is at the base of my neck, bottom of cervical top of thoracic.   I ve been trying to deal with this for months now and I think it s about time to see what is going on...?     Let me know if you would order this or should I see my GP?     Thanks!   Diego MARCN-RN Care Coordinator  [facility]

## 2019-06-03 NOTE — TELEPHONE ENCOUNTER
Chart review: no MRI scheduled or follow up at this time    Trena MARCN, RN Care Coordinator  [facility]

## 2019-06-06 NOTE — TELEPHONE ENCOUNTER
Mike Cortez,     Just wanted to follow up and let you know that Dr. Vallecillo did place the order for the MRI. Possibly you already scheduled because some locations we do not have access to their schedules. If not you can schedule with the numbers below    [facility]:   SOUTH locations: [place], [place], Specialty care [facility]  Call: [phone]     [facility]  Call: [phone]    Trena MARCN, RN Care Coordinator  [facility]

## 2019-06-07 NOTE — TELEPHONE ENCOUNTER
Devon message from patient on 6/6 at 1233:    Great, thank you!! I will get it scheduled.  I can even breath in the mornings until the suboxone kicks in... it s beyond bad.     Thanks for the follow up.    Diego Meng

## 2019-06-14 NOTE — TELEPHONE ENCOUNTER
No MRI scheduled at this time.     Tierra MARCN-RN Care Coordinator  [facility]

## 2019-06-21 NOTE — TELEPHONE ENCOUNTER
Chart reviewed: patient still has not scheduled MRI, he has acknowledged it was ordered and that he can schedule.   Closing    Trena MARCN, RN Care Coordinator  [facility]

## 2019-07-01 ENCOUNTER — MYC REFILL (OUTPATIENT)
Dept: ADDICTION MEDICINE | Facility: CLINIC | Age: 44
End: 2019-07-01

## 2019-07-01 DIAGNOSIS — G89.4 CHRONIC PAIN SYNDROME: ICD-10-CM

## 2019-07-01 RX ORDER — OXYCODONE AND ACETAMINOPHEN 10; 325 MG/1; MG/1
1 TABLET ORAL EVERY 6 HOURS PRN
Qty: 12 TABLET | Refills: 0 | Status: CANCELLED | OUTPATIENT
Start: 2019-07-01

## 2019-07-01 NOTE — TELEPHONE ENCOUNTER
Being handled in separate encounter. Closing    Tierra MARCN-RN Care Coordinator  [facility] [place]

## 2019-07-10 NOTE — PROGRESS NOTES
"                          [facility]    Date of visit: 7/11/2019    Chief complaint:   Chief Complaint   Patient presents with     Pain     Interval history:  Diego Paintingadiroddy is a 43 year old male last seen by me for follow up on 4/24/2019.      Brief history: 43 YEAR OLD MALE who began having low back pain at age 20.  Pain continued and progressed to neck and right shoulder pain for the last 20 years.  History of high dose opioids after having a L4-5 decompression at [facility] by Dr. Rios. Dose escalated and got \"out of control\".  He admits to overuse.  He was transitioned to buprenorphine by Dr. Almonte and was on very high dose Suboxone - 32mg when he was transferred to myself for ongoing care. PMHx is significant for anxiety.     Since his last visit, Diego Meng reports:  - Doing well  - Went a weekend without Suboxone because he ran out and forgot to call for a refill.  He realized how much it was helping with the pain.  Without it he was bedridden all weekend.  His pain was out of control.   - Has decreased his Klonopin from 2mg daily to 1.5mg daily with his PCP at [facility]  - Received one prescription for Oxycodone 5mg #12 tablets that he took IN ADDITION to his Suboxone on 5/22/2019.    - Went to [place] in [place] with his daughter, plane ride was difficult for him with his low back pain.   - Open to having surgery with Dr. Rios and plans to follow up with him at [facility]  - He has been having more pain in his neck and would like to repeat a Cervical MRI.  He is planning to follow up with Gabriel and needs a repeat lumbar MRI prior to this visit.   - Working long hours as an  in a firm downtown.  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    -  with one daughter (8).   - Not really participating in multidisciplinary care.  He saw Teresa Toledo - pain psychology on 7/11/2018.  Did not follow " up  - He was tapering off his Klonopin with his PCP, Dr. Son, and having a hard time with the taper.  He went and found an new PCP at [facility], Dr. Francis,  Who has agreed to continue to prescribe this for him.  He has been on this for over 10 years and is willing to continue to taper but would like to do it slower. He is currently on 1.5mg Klonopin/day and wants to stay on this for awhile.  He was previously on 4mg daily. He knows this is making him feel tired and sedated.    - Dr. Rios has recommended anterior L4-S1 fusion with pelvic flixation for SI joint dysfunction.  2 night hospital stay, however, he is putting a hold on that for now.   - S/P Right knee scope 9/25/2018 with Dr. Kaufman   -He has had many different injections over the years none of which were helpful for more than a few days.        Pain scores:  Pain intensity on average is 8 (6 last visit) on a scale of 0-10.     Current pain treatments:   Suboxone 8-2 mg #2/day  Excedrin #2-6/day for 20 years  lexapro 20 mg daily  Clonazepam 0.5mg #3/day  Robaxin 500-1000mg PRN QID    Labs: CRP <2.9, RF <20, ESR 6    Side Effects: no side effects    Past pain treatments:  Diego GALDAMEZ Yusra has been seen at a pain clinic in the past. [facility] Dr. Vázquez and [facility]  Medications:     Medrol dose carolin -no relief              botox -no relief              Anti-migraine: fioricet, migranal, eletriptan, frovatriptan, midrin, naraptriptan, sumatriptan, zolmitripatn -no relief from any of these              Anti-convulsants: depakote, topiramate, gabapentin, lyrica -all of these made him feel fuzzy and forgetful and they were not helpful              Opioids: oxycontin, morphine -no relief, hydrocodone -no relief, tramadol -no relief                Muscle relaxants: soma -no relief, flexeril -no relief and made him tired, norflex -no relief, tizanidine -no relief and made him tired              NSAIDs: allergic to this class of  medications -rash, abdominal pain and cramping, toradol -upset stomach              Anti-depressants: cymbalta -no relief                Topicals: lidocaine -no relief  PT: [facility] in 2016 -helped for shoulder but flared up back and neck  Psychology: yes for pain at the [facility] -'continues to use these skills'  Acupuncture: tried it -no relief  Chiropractic care: tried it -helped initially but then flared up pain  TENS Unit: no relief    Injections:   R L4 and L5 TF BAY 12/24/16 -flared pain  cervical epidural steroid injection -provided 2-4 weeks of relief but had significant flare up for 2 weeks post-procedure  cervical trigger point injections -minimal relief  lumbar medial branch block -no relief  lumbar paraspinal trigger point injection 5/17/17 - not helpful  Thoracic TRIGGER POINT INJECTION 7/27/2018.     Surgery: right L4-L5 hemilaminectomy May 2015 Dr. Rios, Right knee scope 9/25/2018    Medications:  Current Outpatient Medications   Medication Sig Dispense Refill     atorvastatin (LIPITOR) 40 MG tablet Take 1 tablet (40 mg) by mouth daily 30 tablet 0     buprenorphine HCl-naloxone HCl (SUBOXONE) 8-2 MG per film Place 1 Film under the tongue 2 times daily XDEA = [id number] 60 Film 0     clonazePAM (KLONOPIN) 1 MG tablet 25 tabs first two weeks (avg 1.8 tabs/day), 23 tabs second two weeks (avg 1.6 tabs/day) 48 tablet 0     escitalopram (LEXAPRO) 20 MG tablet Take 1 tablet (20 mg) by mouth daily 90 tablet 3     esomeprazole (NEXIUM) 20 MG capsule Take 1 capsule (20 mg) by mouth every morning (before breakfast) Take 30-60 minutes before eating. 30 capsule 0     lidocaine (XYLOCAINE) 5 % ointment Apply topically as needed for moderate pain (Patient not taking: Reported on 1/31/2019) 50 g 1     Methocarbamol (ROBAXIN PO)        omega 3 1000 MG CAPS Take 1 g by mouth daily 90 capsule      oxyCODONE-acetaminophen (PERCOCET)  MG per tablet Take 1 tablet by mouth  "every 6 hours as needed for severe pain 12 tablet 0       Medical History: any changes in medical history since they were last seen? no    Review of Systems:  The 14 system ROS was reviewed from the intake questionnaire, and is positive for: anxiety and stress  Any bowel or bladder problems: none  Mood: \"okay\"    Physical Exam:  Blood pressure 128/80, pulse 86, weight 94.8 kg (209 lb 1.6 oz), SpO2 96 %.  General: no acute distress, pleasant, sitting comfortably   Gait: antalgic gait  MSK: full strength but with some guarding due to pain    Imaging:  Thoracic MRI 3/23/2018:       Lumbar MRI completed on 4/24/17:  IMPRESSION:  1. L5-S1 small caudally dissecting 0.3 cm disc extrusion slightly to  the left of midline without apparent neural impingement or stenosis.  2. L4-L5 small central disc protrusion, without apparent neural  impingement or central stenosis, and mild bilateral foraminal  stenosis.    Cervical spine MRI completed on 11/14/14:  CONCLUSION:  1. Moderate left foraminal stenosis at C5-C6.  2. Other relatively minor degenerative changes, no further high-grade central canal or foraminal stenosis.     Right shoulder x-ray completed on 4/2/15:  FINDINGS: No evidence of fracture or dislocation. Normal glenohumeral joint alignment without evidence of degenerative changes. No evidence of significant acromioclavicular joint degenerative joint disease. No acromial or coracoid enthesophyte. Mild undersurface acromial sclerosis. Downslope type three acromion. No blastic or lytic lesions.  IMPRESSION:  Right shoulder type three acromion otherwise within normal limits.     [place] Board [employer] Data Base Reviewed: YES; as expected, no concern for abuse or misuse    Assessment:   1. Chronic pain syndrome - widespread myofascial pain  2. Opioid dependence - uncomplicated  3. Chronic low back pain.   S/P Right L4-L5 hemilaminectomy partial facetectomy in May 2015 with Dr. Rios.    4. Chronic neck pain. " Cervical facet arthropathy.  5. Chronic headaches. Likely related to facet arthropathy, psychogenic, and rebound (excedrin)  6. Anxiety with panic attacks. Takes lexapro and clonazepam. Stable.  7. Benzodiazepine dependence - has been on Clonazepam for about 10 years without any abuse or misuse.  Plan is to taper off this over the next couple years secondary to side effects of sedation and memory loss.   8. Tobacco use disorder - currently wearing a patch and working on quiting   9. Chronic bilateral knee pain. Left knee meniscus repair on 11/28/16 with Dr. Kaufman.  Right knee scope 9/20/2018   10. Thoracic myofascial pain - normal MRI  11. Chemical coping.     Plan:  1. Pain Psychology - Recommended, however, the patient went to initial visit and no follow up.  If he continues to struggle to cope with his pain I will recommend that he start to see Marilynn here at the  location.  This should be easier for him to get to with his busy work schedule.   2. PT - YES - plans to schedule at [facility], however, has not done this  3. IMAGING- Schedule CERVICAL and LUMBAR MRI's - I will call with the results  4. Medication Management:    1. repeat UDS TOX 13 at every visit   2. Suboxone 8mg films -continue  BID - he will need to call for a refill    3. I have agreed to give him traditional opioids for pain flares #2-3 days worth only to be taken IN ADDITION to his Suboxone.  Agreed that this should only happen a few times a year.  Not a weekly or monthly thing.  5. OTHER: If he does decide to proceed with surgery the jeanine-operatiove plan is to discontinue Suboxone 6 days prior to having his lumbar fusion.  I will give him Oxycodone during the time period between discontinuing and having his surgery.  Recommend regional anesthesia and intraoperative ketamine gtt.  Dr. Rios will need to manage his post operative pain.  When this is controlled we will get him back on his suboxone with a home induction      Follow up  with Dr. Vallecillo - 3 months       Total time spent was 30 minutes, and more than 50% of face to face time was spent in counseling and/or coordination of care regarding the above assessment and plan.    Reba Vallecillo MD [facility]

## 2019-07-11 ENCOUNTER — OFFICE VISIT (OUTPATIENT)
Dept: PALLIATIVE MEDICINE | Facility: CLINIC | Age: 44
End: 2019-07-11
Payer: COMMERCIAL

## 2019-07-11 VITALS
WEIGHT: 209.1 LBS | BODY MASS INDEX: 29.58 KG/M2 | HEART RATE: 86 BPM | SYSTOLIC BLOOD PRESSURE: 128 MMHG | OXYGEN SATURATION: 96 % | DIASTOLIC BLOOD PRESSURE: 80 MMHG

## 2019-07-11 DIAGNOSIS — G89.4 CHRONIC PAIN SYNDROME: ICD-10-CM

## 2019-07-11 DIAGNOSIS — Z79.899 ENCOUNTER FOR LONG-TERM (CURRENT) USE OF HIGH-RISK MEDICATION: ICD-10-CM

## 2019-07-11 DIAGNOSIS — M54.16 LUMBAR RADICULOPATHY: ICD-10-CM

## 2019-07-11 DIAGNOSIS — F41.1 GAD (GENERALIZED ANXIETY DISORDER): ICD-10-CM

## 2019-07-11 DIAGNOSIS — F11.20 UNCOMPLICATED OPIOID DEPENDENCE (H): Primary | ICD-10-CM

## 2019-07-11 DIAGNOSIS — F11.20 CONTINUOUS OPIOID DEPENDENCE (H): ICD-10-CM

## 2019-07-11 PROCEDURE — 99214 OFFICE O/P EST MOD 30 MIN: CPT | Performed by: ANESTHESIOLOGY

## 2019-07-11 PROCEDURE — 80306 DRUG TEST PRSMV INSTRMNT: CPT | Performed by: ANESTHESIOLOGY

## 2019-07-11 RX ORDER — BUPRENORPHINE AND NALOXONE 8; 2 MG/1; MG/1
1 FILM, SOLUBLE BUCCAL; SUBLINGUAL 2 TIMES DAILY
Qty: 60 FILM | Refills: 0 | Status: SHIPPED | OUTPATIENT
Start: 2019-07-11 | End: 2019-08-30

## 2019-07-11 ASSESSMENT — PAIN SCALES - GENERAL: PAINLEVEL: EXTREME PAIN (8)

## 2019-07-14 ENCOUNTER — HOSPITAL ENCOUNTER (OUTPATIENT)
Dept: MRI IMAGING | Facility: CLINIC | Age: 44
Discharge: HOME | End: 2019-07-14
Attending: ANESTHESIOLOGY | Admitting: ANESTHESIOLOGY
Payer: COMMERCIAL

## 2019-07-14 ENCOUNTER — HOSPITAL ENCOUNTER (OUTPATIENT)
Dept: MRI IMAGING | Facility: CLINIC | Age: 44
End: 2019-07-14
Attending: ANESTHESIOLOGY
Payer: COMMERCIAL

## 2019-07-14 PROCEDURE — 72141 MRI NECK SPINE W/O DYE: CPT

## 2019-07-14 PROCEDURE — A9585 GADOBUTROL INJECTION: HCPCS | Performed by: ANESTHESIOLOGY

## 2019-07-14 PROCEDURE — 25500064 ZZH RX 255 OP 636: Performed by: ANESTHESIOLOGY

## 2019-07-14 PROCEDURE — 72158 MRI LUMBAR SPINE W/O & W/DYE: CPT

## 2019-07-14 RX ORDER — GADOBUTROL 604.72 MG/ML
10 INJECTION INTRAVENOUS ONCE
Status: COMPLETED | OUTPATIENT
Start: 2019-07-14 | End: 2019-07-14

## 2019-07-14 RX ADMIN — GADOBUTROL 10 ML: 604.72 INJECTION INTRAVENOUS at 10:10

## 2019-08-29 DIAGNOSIS — F11.20 UNCOMPLICATED OPIOID DEPENDENCE (H): ICD-10-CM

## 2019-08-29 NOTE — TELEPHONE ENCOUNTER
Patient left  8/28 at 4:31pm    Refill -Suboxone, also if it can be brand name instead of generic because he uses a  coupon and he only pays 5 dollars instead of several hundred            Mitzi MONTOYA    [facility]

## 2019-08-30 RX ORDER — BUPRENORPHINE AND NALOXONE 8; 2 MG/1; MG/1
1 FILM, SOLUBLE BUCCAL; SUBLINGUAL 2 TIMES DAILY
Qty: 60 FILM | Refills: 0 | Status: SHIPPED | OUTPATIENT
Start: 2019-08-30 | End: 2019-09-25

## 2019-08-30 NOTE — TELEPHONE ENCOUNTER
Received request from patient requesting refill(s) for buprenorphine HCl-naloxone HCl (SUBOXONE) 8-2 MG per film Patient requesting name brand, as he has coupon that will allow him to pay $5.      Pt last seen on 07/11/19  Next appt scheduled for 10/10/19    Will facilitate refill.

## 2019-09-25 ENCOUNTER — TELEPHONE (OUTPATIENT)
Dept: PALLIATIVE MEDICINE | Facility: CLINIC | Age: 44
End: 2019-09-25

## 2019-09-25 DIAGNOSIS — F11.20 UNCOMPLICATED OPIOID DEPENDENCE (H): ICD-10-CM

## 2019-09-26 RX ORDER — BUPRENORPHINE AND NALOXONE 8; 2 MG/1; MG/1
1 FILM, SOLUBLE BUCCAL; SUBLINGUAL 2 TIMES DAILY
Qty: 24 FILM | Refills: 0 | Status: SHIPPED | OUTPATIENT
Start: 2019-09-26 | End: 2019-10-10

## 2019-09-26 NOTE — TELEPHONE ENCOUNTER
Patient LM at 0827 on pain clinic voicemail stating that he has 6 films left of his Suboxone and was hoping to get the refill by this weekend. Phone #[phone]      Samina Cuellar    [facility]

## 2019-09-26 NOTE — TELEPHONE ENCOUNTER
Writer attempted to contact patient to determine how many films patient has remaining. Patient did not answer-- LVM to call back. If patient returns call, please ask how many films he has remaining.       Refill for: buprenorphine HCl-naloxone HCl (SUBOXONE) 8-2mg    Last Appointment: 7/11/19    Next Appointment: 10/10/19    No Shows/Cancellations since last appointment: none    Last Refill in Epic (date and amount/how many days):    Disp Refills Start End GLYNN   buprenorphine HCl-naloxone HCl (SUBOXONE) 8-2 MG per film 60 Film 0 8/30/2019  No   Sig - Route: Place 1 Film under the tongue 2 times daily      Most Recent UDS results: POS: benzodiazepines and buprenorphine on 7/11     reviewed and summarized below:   Fill Date Drug      Qty  Days  Prescriber   08/30/2019  Suboxone 8 Mg-2 Mg Sl Film  60 30  An Sonja Caicedo RN  09/26/19  8:11 AM

## 2019-10-09 NOTE — PROGRESS NOTES
"                          [facility]    Date of visit: 10/10/2019    Chief complaint:   Chief Complaint   Patient presents with     Pain     Interval history:  Diego Paintinglancetash is a 44 year old male last seen by me for follow up on 7/11/2019.      Brief history: 44 YEAR OLD MALE who began having low back pain at age 20.  Pain continued and progressed to neck and right shoulder pain for the last 20 years.  History of high dose opioids after having a L4-5 decompression at [facility] by Dr. Rios. Dose escalated and got \"out of control\".  He admits to overuse.  He was transitioned to buprenorphine by Dr. Almonte and was transferred to myself for ongoing care. PMHx is significant for anxiety.     Since his last visit, Diego Paintingfabianomike reports:  - Doing well  - Did 4 sessions of PT at [facility] and it was not helpful for his low back pain.  Therapist Jose was fantastic.  They talked about what to do post operatively for pain management.  Thoracic back pain is no longer bothering him.   - Dr. Rios has recommended anterior L4-S1 fusion with pelvic flixation for SI joint dysfunction.  2 night hospital stay, however, he is putting a hold on that for now.  May schedule around January 2020.  He is worried about doing this on Suboxone because he heard it blocks the opioids from working.   - Went a weekend without Suboxone because he ran out and forgot to call for a refill.  He realized how much it was helping with the pain.  Without it he was bedridden all weekend.  His pain was out of control.   - Has decreased his Klonopin from 2mg daily to 1.5mg daily with his PCP at [facility]  - Received one prescription for Oxycodone 5mg #12 tablets that he took IN ADDITION to his Suboxone on 5/22/2019.    - Working long hours as an  in a firm downtown.  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    -  with one daughter (8).   - Not really " participating in multidisciplinary care.  He saw Teresa Toledo - pain psychology on 7/11/2018.  Did not follow up  - He was tapering off his Klonopin with his PCP, Dr. Son, and having a hard time with the taper.  He went and found an new PCP at [facility], Dr. Francis,  Who has agreed to continue to prescribe this for him.  He has been on this for over 10 years and is willing to continue to taper but would like to do it slower. He is currently on 1.5mg Klonopin/day and wants to stay on this for awhile.  He was previously on 4mg daily. He knows this is making him feel tired and sedated.    -He has had many different injections over the years none of which were helpful for more than a few days.        Pain scores:  Pain intensity on average is 8 (6 last visit) on a scale of 0-10.     Current pain treatments:   Suboxone 8-2 mg #2/day  Excedrin #2-6/day for 20 years  lexapro 20 mg daily  Clonazepam 0.5mg #3/day  Robaxin 500-1000mg PRN QID    Labs: CRP <2.9, RF <20, ESR 6    Side Effects: no side effects    Past pain treatments:  Diego DENICE Meng has been seen at a pain clinic in the past. [facility] Dr. Vázquez and [facility]  Medications:     Medrol dose carolin -no relief              botox -no relief              Anti-migraine: fioricet, migranal, eletriptan, frovatriptan, midrin, naraptriptan, sumatriptan, zolmitripatn -no relief from any of these              Anti-convulsants: depakote, topiramate, gabapentin, lyrica -all of these made him feel fuzzy and forgetful and they were not helpful              Opioids: oxycontin, morphine -no relief, hydrocodone -no relief, tramadol -no relief                Muscle relaxants: soma -no relief, flexeril -no relief and made him tired, norflex -no relief, tizanidine -no relief and made him tired              NSAIDs: allergic to this class of medications -rash, abdominal pain and cramping, toradol -upset stomach              Anti-depressants: cymbalta -no  relief                Topicals: lidocaine -no relief  PT: [facility] in 2016 -helped for shoulder but flared up back and neck  Psychology: yes for pain at the [facility] -'continues to use these skills'  Acupuncture: tried it -no relief  Chiropractic care: tried it -helped initially but then flared up pain  TENS Unit: no relief    Injections:   R L4 and L5 TF BAY 12/24/16 -flared pain  cervical epidural steroid injection -provided 2-4 weeks of relief but had significant flare up for 2 weeks post-procedure  cervical trigger point injections -minimal relief  lumbar medial branch block -no relief  lumbar paraspinal trigger point injection 5/17/17 - not helpful  Thoracic TRIGGER POINT INJECTION 7/27/2018.     Surgery: right L4-L5 hemilaminectomy May 2015 Dr. Rios, Right knee scope 9/25/2018    Medications:  Current Outpatient Medications   Medication Sig Dispense Refill     atorvastatin (LIPITOR) 40 MG tablet Take 1 tablet (40 mg) by mouth daily 30 tablet 0     buprenorphine HCl-naloxone HCl (SUBOXONE) 8-2 MG per film Place 1 Film under the tongue 2 times daily XDEA = [id number] Film 0     clonazePAM (KLONOPIN) 1 MG tablet 25 tabs first two weeks (avg 1.8 tabs/day), 23 tabs second two weeks (avg 1.6 tabs/day) 48 tablet 0     escitalopram (LEXAPRO) 20 MG tablet Take 1 tablet (20 mg) by mouth daily 90 tablet 3     esomeprazole (NEXIUM) 20 MG capsule Take 1 capsule (20 mg) by mouth every morning (before breakfast) Take 30-60 minutes before eating. 30 capsule 0     lidocaine (XYLOCAINE) 5 % ointment Apply topically as needed for moderate pain (Patient not taking: Reported on 1/31/2019) 50 g 1     Methocarbamol (ROBAXIN PO)        omega 3 1000 MG CAPS Take 1 g by mouth daily 90 capsule      oxyCODONE-acetaminophen (PERCOCET)  MG per tablet Take 1 tablet by mouth every 6 hours as needed for severe pain (Patient not taking: Reported on 7/11/2019) 12 tablet 0       Medical  "History: any changes in medical history since they were last seen? no    Review of Systems:  The 14 system ROS was reviewed from the intake questionnaire, and is positive for: anxiety and stress  Any bowel or bladder problems: none  Mood: \"okay\"    Physical Exam:  Blood pressure 128/80, pulse 84, SpO2 97 %.  General: no acute distress, pleasant, sitting comfortably   Gait: antalgic gait  MSK: full strength but with some guarding due to pain    Imaging:  THORACIC MRI 3/23/2018:       CERVICAL MRI completed on 7/14/19:  FINDINGS: Normal cervical lordosis. Anterior posterior alignment of  the spine is within normal limits. Vertebral body height is maintained  without evidence of fracture. There are no destructive osseous  lesions. Mild loss of intervertebral disc height with disc desiccation  and degenerative endplate changes at C3-C4, C4-C5, and C5-C6. No STIR  hyperintense endplate edema.      No abnormal signal appreciated within the visualized spinal cord.     Level by level as follows:      C2-C3: No significant disc herniation. No spinal canal or neural  foraminal narrowing.      C3-C4: Mild posterior disc bulge with bilateral uncinate spurring  right greater than left. Mild bilateral facet hypertrophy. No spinal  canal narrowing. Mild right neural foraminal narrowing. Mild left  neural foraminal narrowing.     C4-C5: Trace posterior disc bulge. No spinal canal or neural foraminal  narrowing.      C5-C6: Mild posterior disc bulge and uncinate spurring. No spinal  canal narrowing. Mild bilateral neural foraminal narrowing.      C6-C7: No significant disc herniation. No spinal canal or neural  foraminal narrowing.      C7-T1: No significant disc herniation. No spinal canal or neural  foraminal narrowing.      Paraspinous soft tissues are unremarkable.                                                                     IMPRESSION:  Mild degenerative disc changes in the mid cervical spine  as described above. Mild " neural foraminal narrowings at C3-C4 and  C5-C6. No spinal canal narrowing at any level.     LUMBAR MRI completed on 7/14/19:   FINDINGS: There are 5 lumbar-type vertebral bodies assumed for the  purposes of this dictation. The distal spinal cord and cauda equina  appear normal without abnormal enhancement.      Alignment of the lumbar spine is normal. No loss of vertebral body  height. There are no destructive osseous lesions. Moderate loss of  intervertebral disc height with disc desiccation and degenerative  endplate changes at L4-L5 and L5-S1. Minimal STIR hyperintense  endplate edema at L4-L5 as well as L5-S1 posteriorly.     Level by level as follows:      T12-L1: No significant disc herniation. No spinal canal or neural  foraminal narrowing.      L1-L2: No significant disc herniation. No spinal canal or neural  foraminal narrowing.      L2-L3: Minimal left paracentral disc protrusion. No spinal canal or  neural foraminal narrowing.      L3-L4: No significant disc herniation. No spinal canal or neural  foraminal narrowing.      L4-L5: Small posterior disc bulge with mild uncinate spurring.  Probable small superimposed right paracentral disc extrusion extending  caudally to the superior aspect of L5. No spinal canal or neural  foraminal narrowing.      L5-S1: Small posterior disc bulge with superimposed small left  paracentral disc extrusion extending caudally to the upper aspect of  S1. No spinal canal narrowing. No neural foraminal narrowing.      Paraspinous soft tissues:  Normal.                                                                     IMPRESSION:  Mild degenerative disc changes at L4-L5 and L5-S1. No  spinal canal or neural foraminal narrowing at any level. Overall,  there does not appear to be significant change since prior MR.    Right shoulder x-ray completed on 4/2/15:  FINDINGS: No evidence of fracture or dislocation. Normal glenohumeral joint alignment without evidence of degenerative  changes. No evidence of significant acromioclavicular joint degenerative joint disease. No acromial or coracoid enthesophyte. Mild undersurface acromial sclerosis. Downslope type three acromion. No blastic or lytic lesions.  IMPRESSION:  Right shoulder type three acromion otherwise within normal limits.     [place] Board of Pharmacy Data Base Reviewed: YES; as expected, no concern for abuse or misuse    Assessment:   1. Chronic pain syndrome - widespread myofascial pain  2. Opioid dependence - uncomplicated  3. Chronic low back pain.   S/P Right L4-L5 hemilaminectomy partial facetectomy in May 2015 with Dr. Rios.    4. Chronic neck pain. Cervical facet arthropathy.  5. Chronic headaches. Likely related to facet arthropathy, psychogenic, and rebound (excedrin)  6. Anxiety with panic attacks. Takes lexapro and clonazepam. Stable.  7. Benzodiazepine dependence - has been on Clonazepam for about 10 years without any abuse or misuse.  Plan is to taper off this over the next couple years secondary to side effects of sedation and memory loss.   8. Tobacco use disorder - currently wearing a patch and working on quiting   9. Chronic bilateral knee pain. Left knee meniscus repair on 11/28/16 with Dr. Kaufman.  Right knee scope 9/20/2018   10. Thoracic myofascial pain - normal MRI  11. Chemical coping.     Plan:  1. Pain Psychology - Recommended, however, the patient went to initial visit and no follow up.  If he continues to struggle to cope with his pain I will recommend that he start to see Marilynn here at the  location.  This should be easier for him to get to with his busy work schedule.   2. PT - YES - recently finished a course of PT at [facility]  3. IMAGING-  Reviewed CERVICAL and LUMBAR MRI's   4. Medication Management:    1. repeat UDS TOX 13 at every visit   2. Suboxone 8mg films -continue  BID    3. I have agreed to give him traditional opioids for pain flares #2-3 days worth only to be taken IN  ADDITION to his Suboxone.  Agreed that this should only happen a few times a year.  Not a weekly or monthly thing.  5. OTHER: If he does decide to proceed with surgery the jeanine-operatiove plan is to decrease his suboxone 3 days prior to surgery to 4-8mg daily.  I will give him Oxycodone for this time period to prevent withdrawal.   Recommend regional anesthesia and intraoperative ketamine gtt.  Dr. Rios will need to manage his post operative pain.  Would recommend using twice the normal amount of post operative opioids for post op pain.  When this is controlled we will get him back on his suboxone with a home induction.        Follow up with Dr. Vallecillo - 3 months       Total time spent was 30 minutes, and more than 50% of face to face time was spent in counseling and/or coordination of care regarding the above assessment and plan.    Reba Vallecillo MD [facility]  [facility]

## 2019-10-10 ENCOUNTER — OFFICE VISIT (OUTPATIENT)
Dept: PALLIATIVE MEDICINE | Facility: CLINIC | Age: 44
End: 2019-10-10
Payer: COMMERCIAL

## 2019-10-10 ENCOUNTER — TELEPHONE (OUTPATIENT)
Dept: PALLIATIVE MEDICINE | Facility: CLINIC | Age: 44
End: 2019-10-10

## 2019-10-10 VITALS — OXYGEN SATURATION: 97 % | HEART RATE: 84 BPM | DIASTOLIC BLOOD PRESSURE: 80 MMHG | SYSTOLIC BLOOD PRESSURE: 128 MMHG

## 2019-10-10 DIAGNOSIS — Z79.899 ENCOUNTER FOR LONG-TERM (CURRENT) USE OF HIGH-RISK MEDICATION: ICD-10-CM

## 2019-10-10 DIAGNOSIS — G89.4 CHRONIC PAIN SYNDROME: Primary | ICD-10-CM

## 2019-10-10 DIAGNOSIS — F13.20 BENZODIAZEPINE DEPENDENCE, CONTINUOUS (H): ICD-10-CM

## 2019-10-10 DIAGNOSIS — F11.20 CONTINUOUS OPIOID DEPENDENCE (H): ICD-10-CM

## 2019-10-10 DIAGNOSIS — F11.20 UNCOMPLICATED OPIOID DEPENDENCE (H): ICD-10-CM

## 2019-10-10 PROCEDURE — 80306 DRUG TEST PRSMV INSTRMNT: CPT | Performed by: ANESTHESIOLOGY

## 2019-10-10 PROCEDURE — 99214 OFFICE O/P EST MOD 30 MIN: CPT | Performed by: ANESTHESIOLOGY

## 2019-10-10 RX ORDER — BUPRENORPHINE AND NALOXONE 8; 2 MG/1; MG/1
1 FILM, SOLUBLE BUCCAL; SUBLINGUAL 2 TIMES DAILY
Qty: 60 FILM | Refills: 2 | Status: SHIPPED | OUTPATIENT
Start: 2019-10-10 | End: 2020-01-06

## 2019-10-10 ASSESSMENT — PAIN SCALES - GENERAL: PAINLEVEL: SEVERE PAIN (7)

## 2019-10-10 NOTE — TELEPHONE ENCOUNTER
Prior Authorization Retail Medication Request    Medication/Dose: buprenorphine HCl-naloxone HCl (SUBOXONE) 8-2 MG per film  ICD code (if different than what is on RX):    Previously Tried and Failed:    Rationale:      Insurance Name:  [facility]  Insurance ID:  [id number]      Pharmacy Information:    [facility]  [address] [place] [address]  Phone: [phone] Fax: [phone]

## 2019-10-10 NOTE — TELEPHONE ENCOUNTER
Central Prior Authorization Team   Phone: [phone]      PA Initiation    Medication: buprenorphine HCl-naloxone HCl (SUBOXONE) 8-2 MG per film - INITIATED  Insurance Company: [employer] [place] - Phone [phone] Fax [phone]  Pharmacy Filling the Rx: [facility][id number] [facility] [address] AT [place] [address]  Filling Pharmacy Phone: [phone]  Filling Pharmacy Fax: [phone]  Start Date: 10/10/2019

## 2019-10-14 NOTE — TELEPHONE ENCOUNTER
Central Prior Authorization Team   Phone: [phone]      Prior Authorization Not Needed per Insurance    10/12/2019  Medication: buprenorphine HCl-naloxone HCl (SUBOXONE) 8-2 MG per film - NOT NEEDED  Insurance Company: [employer] - Phone [phone] Fax [phone]  Expected CoPay:      Pharmacy Filling the Rx: [id number] [facility] [address] [place] [address]  Pharmacy Notified: Yes  Patient Notified: Yes (**Instructed pharmacy to notify patient when script is ready to /ship.**)

## 2019-11-19 DIAGNOSIS — M79.18 MYOFASCIAL PAIN: Primary | ICD-10-CM

## 2019-11-19 RX ORDER — METHOCARBAMOL 500 MG/1
500 TABLET, FILM COATED ORAL 3 TIMES DAILY
Qty: 90 TABLET | Refills: 0 | Status: SHIPPED | OUTPATIENT
Start: 2019-11-19 | End: 2020-01-09

## 2019-11-19 NOTE — TELEPHONE ENCOUNTER
Received fax request from   [id number] - [place], [place] - [address] AT [place] [address]  [address] [facility] [address]  Phone: [phone] Fax: [phone]    pharmacy requesting refill(s) for Methocarbamol (ROBAXIN PO)     Last refilled on UNKNOWN -pharmacist said if there are no fills after a year and half the rx falls out of the system.    Pt last seen on 10/10/19  Next appt scheduled for 01/9/20    Will facilitate refill.    Karla Song, [facility]   [facility] Management Center  November 19, 2019

## 2019-11-19 NOTE — TELEPHONE ENCOUNTER
Reason for call:  Medication   If this is a refill request, has the caller requested the refill from the pharmacy already? No  Will the patient be using a [facility]? No  Name of the pharmacy and phone number for the current request:   [id number] - [facility] [address] [place] [address]  [address] [facility] [address]  Phone: [phone] Fax: [phone]        Name of the medication requested: Methocarbamol (ROBAXIN PO)     Other request: no    Phone number to reach patient:  Cell number on file:    Telephone Information:   Mobile [phone]       Best Time:  n/a    Can we leave a detailed message on this number?  Not Applicable          -----------------------------------  Patient is calling saying he is having really bad pain in his lumbar spin and is wondering if he can get a short script of a strong Rx to help with the pain.        Karla Song, [facility]   Pain Management Center  November 19, 2019

## 2019-11-21 ENCOUNTER — TELEPHONE (OUTPATIENT)
Dept: PALLIATIVE MEDICINE | Facility: CLINIC | Age: 44
End: 2019-11-21

## 2019-11-21 DIAGNOSIS — M54.16 LUMBAR RADICULOPATHY: Primary | ICD-10-CM

## 2019-11-21 RX ORDER — OXYCODONE AND ACETAMINOPHEN 5; 325 MG/1; MG/1
1 TABLET ORAL EVERY 6 HOURS PRN
Qty: 12 TABLET | Refills: 0 | Status: SHIPPED | OUTPATIENT
Start: 2019-11-21 | End: 2020-04-02

## 2019-11-21 NOTE — TELEPHONE ENCOUNTER
Patient is calling again today saying he is in a lot of pain the pain is going from his spine to his leg. He wants to know if he can get a script for oxycodone because the rest of his rx is not helping him with the pain.         Karla Song, [facility]   Pain Management Center  November 21, 2019

## 2019-11-21 NOTE — TELEPHONE ENCOUNTER
"I called the patient and over the past week \"my lumbar spine has been freaking out\".  He complains of bilateral low back pain with radiation into his right buttock and down the lateral Right leg to the level of the foot.   He denies any inciting event.  This is an exacerbation of his normal chronic pain and not new.  He denies any red flags: no saddle anesthesia, weakness or bowel or bladder problems.  He was advised to continue to take his Suboxone.  I will give him a ONE time prescription for oxycodone to take in addition to his Suboxone to help with this pain flare.  He was advised to go to the ER or urgent care to have this evaluated if things get worse, his symptoms change or he begins to have any red flag symptoms.  He has a follow up scheduled with me in clinic.     Reba Vallecillo MD   "

## 2020-01-06 ENCOUNTER — TELEPHONE (OUTPATIENT)
Dept: PALLIATIVE MEDICINE | Facility: CLINIC | Age: 45
End: 2020-01-06

## 2020-01-06 DIAGNOSIS — G89.4 CHRONIC PAIN SYNDROME: ICD-10-CM

## 2020-01-06 DIAGNOSIS — F11.20 UNCOMPLICATED OPIOID DEPENDENCE (H): Primary | ICD-10-CM

## 2020-01-06 RX ORDER — BUPRENORPHINE AND NALOXONE 8; 2 MG/1; MG/1
1 FILM, SOLUBLE BUCCAL; SUBLINGUAL 2 TIMES DAILY
Qty: 60 FILM | Refills: 2 | Status: SHIPPED | OUTPATIENT
Start: 2020-01-06 | End: 2020-04-02

## 2020-01-06 NOTE — TELEPHONE ENCOUNTER
Routing to provider to review medication prepped per below      Suboxone 8-2 mg, #60, Refill: No  Sig:Dispense 01/06/20 start 01/08/2020  Last picked up 12/07/19 with start on 12/09/19  Due 01/08/2020    Per last OV note 10/10/19:  Suboxone 8mg films -continue  BID     Follow up scheduled for 01/09/2020    Regina Birch RN  Care Coordinator  [facility]

## 2020-01-06 NOTE — TELEPHONE ENCOUNTER
CENTRAL PRIOR AUTHORIZATION  [phone]    PA Initiation    Medication: buprenorphine HCl-naloxone HCl (SUBOXONE) 8-2 MG per film  Insurance Company: [employer] [place] - Phone [phone] Fax [phone]  Pharmacy Filling the Rx: [facility] #[id number] [facility] [address] AT [place] [address]  Filling Pharmacy Phone: [phone]  Filling Pharmacy Fax:    Start Date: 1/6/2020

## 2020-01-06 NOTE — TELEPHONE ENCOUNTER
Prior Authorization Retail Medication Request    Medication/Dose: buprenorphine HCl-naloxone HCl (SUBOXONE) 8-2 MG per film  ICD code (if different than what is on RX):    Previously Tried and Failed:    Rationale:      Insurance Name:  [phone]  Insurance ID:  [id number]      Pharmacy Information (if different than what is on RX)  Name:  [facility]  Phone:  [phone]

## 2020-01-06 NOTE — TELEPHONE ENCOUNTER
Received fax from pharmacy requesting refill(s) buprenorphine HCl-naloxone HCl (SUBOXONE) 8-2 MG per film     Last dispensed from pharmacy on 12/7/2019 (spoke to patient he will be out of medication before his visit on 1/9/2020)    Pt last seen by prescribing provider on 10/10/2019  Next appt scheduled for 1/9/2020     checked in the past 6 months? Yes If no, print current report and give to RN    Last urine drug screen date 10/10/2019  Current opioid agreement on file (completed within the last year) NO Date of opioid agreement:     E-prescribe to    [facility]  [id number] [place] [address]  Phone: [phone] Fax: [phone]      Will route to nursing pool for review and preparation of prescription(s).

## 2020-01-07 NOTE — TELEPHONE ENCOUNTER
I had to leave a message for the patient to inquire about his pharmacy . I called [employer] and they indicated that his PBM is [employer] and he is active.  Along with checking his status on [employer].   I called [employer] again and spoke with a different rep that could not find in their system, she tried his ID [id number] multiple ways and she could not pull him up.    I did leave the PA Department's phone number of [phone] incase he calls and I am not here and someone else can obtain and double check his pharmacy information.

## 2020-01-07 NOTE — TELEPHONE ENCOUNTER
Called and spoke with Renuka and he was not able to find the member in the [employer] system.  I called the filling Pharmacy and had to leave a message as they are issues with their phone system. The patient's insurance information when I do a check in [employer] is the same as [employer].

## 2020-01-07 NOTE — TELEPHONE ENCOUNTER
Patient returned phone call and confirmed that the insurance is correct and he did state that he prefers the brand Suboxone as he feels it works better.  I did let him know that I would resubmit his request for the brand.  Colin from the pharmacy did also return my phone call stating that the patient did  the generic product and it did process through his insurance.    CENTRAL PRIOR AUTHORIZATION  [phone]    PA Initiation    Medication: buprenorphine HCl-naloxone HCl (SUBOXONE) 8-2 MG per film - INITIATED (REQUESTED URGENT)  Insurance Company: [employer] [place] - Phone [phone] Fax [phone]  Pharmacy Filling the Rx: [facility] #[id number] [facility] [address] AT [place] [address]  Filling Pharmacy Phone: [phone]  Filling Pharmacy Fax:    Start Date: 1/6/2020

## 2020-01-08 ENCOUNTER — TELEPHONE (OUTPATIENT)
Dept: PALLIATIVE MEDICINE | Facility: CLINIC | Age: 45
End: 2020-01-08

## 2020-01-08 NOTE — TELEPHONE ENCOUNTER
CENTRAL PRIOR AUTHORIZATION  [phone]    PA Initiation    Medication: SUBOXONE (BRAND) 8-2 MG  Insurance Company: [facility] - Phone [phone] Fax [phone]  Pharmacy Filling the Rx: [facility][id number] [facility] [address] AT [place] [address]  Filling Pharmacy Phone: [phone]  Filling Pharmacy Fax:    Start Date: 1/8/2020

## 2020-01-08 NOTE — TELEPHONE ENCOUNTER
Prior Authorization - New Encounter created, please see T.E. dated 01/08/2020.    Medication: buprenorphine HCl-naloxone HCl (SUBOXONE) 8-2 MG per film   Insurance Company: [employer] [place] - Phone [phone] Fax [phone]  Expected CoPay:      Pharmacy Filling the Rx: [facility][id number] [address] [place] [address]  Pharmacy Notified: Yes  Patient Notified: Yes    I received a request for additional information, along with filling out the Coverage Exception form. I have created a T.E. due to that they want the form filled out.  On CMM, they merged my case and and canceled it and sent in the Coverage Exception form. Please see that encounter as it will contain details and updates.

## 2020-01-08 NOTE — PROGRESS NOTES
"                          [facility]    Date of visit: 1/9/2020    Chief complaint:   Chief Complaint   Patient presents with     Pain     Interval history:  Diego GALDAMEZ Bebetotash is a 44 year old male last seen by me for follow up on 10/10/2019.      Brief history: 44 YEAR OLD MALE who began having low back pain at age 20.  Pain continued and progressed to neck and right shoulder pain for the last 20 years.  History of high dose opioids after having a L4-5 decompression at [facility] by Dr. Rios. Dose escalated and got \"out of control\".  He admits to overuse.  He was transitioned to buprenorphine by Dr. Almonte and was transferred to myself for ongoing care. PMHx is significant for anxiety.     Since his last visit, Diego GALDAMEZ Azramike reports:  - Doing well  - He is here with his daughter, Yonathan who is 9 years old.  He is .   - Had a pain flare 11/21/2019 and was given a one time prescription for oxycodone to take in addition to his suboxone.  The last time I did this was on 5/22/2019.   - Dr. Rios has recommended a L4-S1 fusion with pelvic fixation for SI joint dysfunction which would require a 2 night hospital stay. However the surgery was denied by [employer].  They are in the appeal process now.   - Taking Robaxin daily.  He finds this really helpful and he has had no side effects.   - Did 4 sessions of PT at [facility] and it was not helpful for his low back pain. Helpful for his thoracic back pain which is not longer painful for him.  Therapist Jose was fantastic.    - Went a weekend without Suboxone because he ran out and forgot to call for a refill.  He realized how much it was helping with the pain.  Without it he was bedridden all weekend.  His pain was out of control.   - Working long hours as an  in a firm downtown.  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    - Not really participating in multidisciplinary care. "  He saw Teresa Toledo - pain psychology on 7/11/2018.  Did not follow up  - He was tapering off his Klonopin with his PCP, Dr. Son, and having a hard time with the taper.  He went and found an new PCP at [facility], Dr. Francis,  Who has agreed to continue to prescribe this for him.  He has been on this for over 10 years and is willing to continue to taper but would like to do it slower. He is currently on 1.5mg Klonopin/day and wants to stay on this for awhile.  He was previously on 4mg daily. He knows this is making him feel tired and sedated.    -He has had many different injections over the years none of which were helpful for more than a few days.        Pain scores:  Pain intensity on average is 6 (8 last visit) on a scale of 0-10.     Current pain treatments:   Suboxone 8-2 mg #2/day (BRAND NAME ONLY)  Excedrin #2-6/day for 20 years  lexapro 20 mg daily  Clonazepam 0.5mg #3/day  Robaxin 500-1000mg PRN QID    Labs: CRP <2.9, RF <20, ESR 6    Side Effects: no side effects    Past pain treatments:  Diego DENICE Meng has been seen at a pain clinic in the past. [facility] Dr. Vázquez and [facility]  Medications:     Medrol dose carolin -no relief              botox -no relief              Anti-migraine: fioricet, migranal, eletriptan, frovatriptan, midrin, naraptriptan, sumatriptan, zolmitripatn -no relief from any of these              Anti-convulsants: depakote, topiramate, gabapentin, lyrica -all of these made him feel fuzzy and forgetful and they were not helpful              Opioids: oxycontin, morphine -no relief, hydrocodone -no relief, tramadol -no relief                Muscle relaxants: soma -no relief, flexeril -no relief and made him tired, norflex -no relief, tizanidine -no relief and made him tired              NSAIDs: allergic to this class of medications -rash, abdominal pain and cramping, toradol -upset stomach              Anti-depressants: cymbalta -no relief                 Topicals: lidocaine -no relief  PT: [facility] in 2016 -helped for shoulder but flared up back and neck  Psychology: yes for pain at the [facility] -'continues to use these skills'  Acupuncture: tried it -no relief  Chiropractic care: tried it -helped initially but then flared up pain  TENS Unit: no relief    Injections:   R L4 and L5 TF BAY 12/24/16 -flared pain  cervical epidural steroid injection -provided 2-4 weeks of relief but had significant flare up for 2 weeks post-procedure  cervical trigger point injections -minimal relief  lumbar medial branch block -no relief  lumbar paraspinal trigger point injection 5/17/17 - not helpful  Thoracic TRIGGER POINT INJECTION 7/27/2018.     Surgery: right L4-L5 hemilaminectomy May 2015 Dr. Rios, Right knee scope 9/25/2018    Medications:  Current Outpatient Medications   Medication Sig Dispense Refill     atorvastatin (LIPITOR) 40 MG tablet Take 1 tablet (40 mg) by mouth daily 30 tablet 0     buprenorphine HCl-naloxone HCl (SUBOXONE) 8-2 MG per film Place 1 Film under the tongue 2 times daily XDEA = [id number] BRAND NAME 60 Film 2     clonazePAM (KLONOPIN) 1 MG tablet 25 tabs first two weeks (avg 1.8 tabs/day), 23 tabs second two weeks (avg 1.6 tabs/day) 48 tablet 0     escitalopram (LEXAPRO) 20 MG tablet Take 1 tablet (20 mg) by mouth daily 90 tablet 3     esomeprazole (NEXIUM) 20 MG capsule Take 1 capsule (20 mg) by mouth every morning (before breakfast) Take 30-60 minutes before eating. 30 capsule 0     lidocaine (XYLOCAINE) 5 % ointment Apply topically as needed for moderate pain (Patient not taking: Reported on 1/31/2019) 50 g 1     methocarbamol (ROBAXIN) 500 MG tablet Take 1 tablet (500 mg) by mouth 3 times daily 90 tablet 0     omega 3 1000 MG CAPS Take 1 g by mouth daily 90 capsule      oxyCODONE-acetaminophen (PERCOCET)  MG per tablet Take 1 tablet by mouth every 6 hours as needed for severe pain  "(Patient not taking: Reported on 7/11/2019) 12 tablet 0     oxyCODONE-acetaminophen (PERCOCET) 5-325 MG tablet Take 1 tablet by mouth every 6 hours as needed for severe pain 12 tablet 0       Medical History: any changes in medical history since they were last seen? no    Review of Systems:  The 14 system ROS was reviewed from the intake questionnaire, and is positive for: anxiety and stress  Any bowel or bladder problems: none  Mood: \"okay\"    Physical Exam:  Blood pressure 122/74, pulse 79, SpO2 95 %.  General: no acute distress, pleasant, sitting comfortably   Gait: antalgic gait  MSK: full strength but with some guarding due to pain    Imaging:  THORACIC MRI 3/23/2018:       CERVICAL MRI completed on 7/14/19:  FINDINGS: Normal cervical lordosis. Anterior posterior alignment of  the spine is within normal limits. Vertebral body height is maintained  without evidence of fracture. There are no destructive osseous  lesions. Mild loss of intervertebral disc height with disc desiccation  and degenerative endplate changes at C3-C4, C4-C5, and C5-C6. No STIR  hyperintense endplate edema.      No abnormal signal appreciated within the visualized spinal cord.     Level by level as follows:      C2-C3: No significant disc herniation. No spinal canal or neural  foraminal narrowing.      C3-C4: Mild posterior disc bulge with bilateral uncinate spurring  right greater than left. Mild bilateral facet hypertrophy. No spinal  canal narrowing. Mild right neural foraminal narrowing. Mild left  neural foraminal narrowing.     C4-C5: Trace posterior disc bulge. No spinal canal or neural foraminal  narrowing.      C5-C6: Mild posterior disc bulge and uncinate spurring. No spinal  canal narrowing. Mild bilateral neural foraminal narrowing.      C6-C7: No significant disc herniation. No spinal canal or neural  foraminal narrowing.      C7-T1: No significant disc herniation. No spinal canal or neural  foraminal narrowing. "      Paraspinous soft tissues are unremarkable.                                                                     IMPRESSION:  Mild degenerative disc changes in the mid cervical spine  as described above. Mild neural foraminal narrowings at C3-C4 and  C5-C6. No spinal canal narrowing at any level.     LUMBAR MRI completed on 7/14/19:   FINDINGS: There are 5 lumbar-type vertebral bodies assumed for the  purposes of this dictation. The distal spinal cord and cauda equina  appear normal without abnormal enhancement.      Alignment of the lumbar spine is normal. No loss of vertebral body  height. There are no destructive osseous lesions. Moderate loss of  intervertebral disc height with disc desiccation and degenerative  endplate changes at L4-L5 and L5-S1. Minimal STIR hyperintense  endplate edema at L4-L5 as well as L5-S1 posteriorly.     Level by level as follows:      T12-L1: No significant disc herniation. No spinal canal or neural  foraminal narrowing.      L1-L2: No significant disc herniation. No spinal canal or neural  foraminal narrowing.      L2-L3: Minimal left paracentral disc protrusion. No spinal canal or  neural foraminal narrowing.      L3-L4: No significant disc herniation. No spinal canal or neural  foraminal narrowing.      L4-L5: Small posterior disc bulge with mild uncinate spurring.  Probable small superimposed right paracentral disc extrusion extending  caudally to the superior aspect of L5. No spinal canal or neural  foraminal narrowing.      L5-S1: Small posterior disc bulge with superimposed small left  paracentral disc extrusion extending caudally to the upper aspect of  S1. No spinal canal narrowing. No neural foraminal narrowing.      Paraspinous soft tissues:  Normal.                                                                     IMPRESSION:  Mild degenerative disc changes at L4-L5 and L5-S1. No  spinal canal or neural foraminal narrowing at any level. Overall,  there does not  appear to be significant change since prior MR.    Right shoulder x-ray completed on 4/2/15:  FINDINGS: No evidence of fracture or dislocation. Normal glenohumeral joint alignment without evidence of degenerative changes. No evidence of significant acromioclavicular joint degenerative joint disease. No acromial or coracoid enthesophyte. Mild undersurface acromial sclerosis. Downslope type three acromion. No blastic or lytic lesions.  IMPRESSION:  Right shoulder type three acromion otherwise within normal limits.     [place] Board of Pharmacy Data Base Reviewed: YES; as expected, no concern for abuse or misuse    Assessment:   1. Chronic pain syndrome - widespread myofascial pain  2. Opioid dependence - uncomplicated  3. Chronic low back pain.   S/P Right L4-L5 hemilaminectomy partial facetectomy in May 2015 with Dr. Rios.    4. Chronic neck pain. Cervical facet arthropathy.  5. Chronic headaches. Likely related to facet arthropathy, psychogenic, and rebound (excedrin)  6. Anxiety with panic attacks. Takes lexapro and clonazepam. Stable.  7. Benzodiazepine dependence - has been on Clonazepam for about 10 years without any abuse or misuse.  Plan is to taper off this over the next couple years secondary to side effects of sedation and memory loss.   8. Tobacco use disorder - currently wearing a patch and working on quiting   9. Chronic bilateral knee pain. Left knee meniscus repair on 11/28/16 with Dr. Kaufman.  Right knee scope 9/20/2018   10. Chronic Thoracic pain - Etiology likely myofascial pain. He has a normal MRI      Plan:  1. Pain Psychology - Recommended, however, the patient went to initial visit and no follow up.  If he continues to struggle to cope with his pain I will recommend that he start to see Marilynn here at the  location.  This should be easier for him to get to with his busy work schedule.   2. PT - YES - recently finished a course of PT at [facility]  3. IMAGING-  Reviewed CERVICAL,  THORACIC and LUMBAR MRI's   4. Medication Management:    1. repeat UDS TOX 13 at every visit   2. Suboxone 8mg films -continue  BID    3. I have agreed to give him traditional opioids for pain flares #2-3 days worth only to be taken IN ADDITION to his Suboxone.  Agreed that this should only happen a few times a year.  Not a weekly or monthly thing.  5. OTHER: If he does decide to proceed with surgery the jeanine-operatiove plan is to decrease his suboxone 3 days prior to surgery to 4-8mg daily.  I will give him Oxycodone for this time period to prevent withdrawal.   Recommend regional anesthesia and intraoperative ketamine gtt.  Dr. Rios will need to manage his post operative pain.  Would recommend using twice the normal amount of post operative opioids for post op pain.  When this is controlled we will get him back on his suboxone with a home induction.        Follow up with Dr. Keith - 3 months       Total time spent was 30 minutes, and more than 50% of face to face time was spent in counseling and/or coordination of care regarding the above assessment and plan.    MANJU KEITH MD   Pain Management & Addiction Medicine

## 2020-01-09 ENCOUNTER — OFFICE VISIT (OUTPATIENT)
Dept: PALLIATIVE MEDICINE | Facility: CLINIC | Age: 45
End: 2020-01-09
Payer: COMMERCIAL

## 2020-01-09 VITALS — HEART RATE: 79 BPM | SYSTOLIC BLOOD PRESSURE: 122 MMHG | OXYGEN SATURATION: 95 % | DIASTOLIC BLOOD PRESSURE: 74 MMHG

## 2020-01-09 DIAGNOSIS — G89.4 CHRONIC PAIN SYNDROME: Primary | ICD-10-CM

## 2020-01-09 DIAGNOSIS — F13.20 BENZODIAZEPINE DEPENDENCE, CONTINUOUS (H): ICD-10-CM

## 2020-01-09 DIAGNOSIS — F41.1 GAD (GENERALIZED ANXIETY DISORDER): ICD-10-CM

## 2020-01-09 DIAGNOSIS — F11.20 UNCOMPLICATED OPIOID DEPENDENCE (H): ICD-10-CM

## 2020-01-09 DIAGNOSIS — Z79.899 ENCOUNTER FOR LONG-TERM (CURRENT) USE OF HIGH-RISK MEDICATION: ICD-10-CM

## 2020-01-09 DIAGNOSIS — M79.18 MYOFASCIAL PAIN: ICD-10-CM

## 2020-01-09 PROCEDURE — 99214 OFFICE O/P EST MOD 30 MIN: CPT | Performed by: ANESTHESIOLOGY

## 2020-01-09 RX ORDER — METHOCARBAMOL 500 MG/1
500 TABLET, FILM COATED ORAL 3 TIMES DAILY
Qty: 90 TABLET | Refills: 0 | Status: SHIPPED | OUTPATIENT
Start: 2020-01-09 | End: 2020-04-01

## 2020-01-09 ASSESSMENT — PAIN SCALES - GENERAL: PAINLEVEL: SEVERE PAIN (7)

## 2020-01-09 NOTE — TELEPHONE ENCOUNTER
Prior Authorization Approval    Authorization Effective Date: 1/6/2020  Authorization Expiration Date: 1/6/2021  Medication: SUBOXONE 8-2 MG FILMS  Approved Dose/Quantity: UP TO 2 FILMS PER DAY  Reference #: COVERAGE EXCEPTION FORM / [id number]    Insurance Company: [employer] [place] - Phone [phone] Fax [phone]  Expected CoPay:       CoPay Card Available: No    Foundation Assistance Needed:    Which Pharmacy is filling the prescription (Not needed for infusion/clinic administered): [id number] [address] AT [place] [address]  Pharmacy Notified: Yes: Pharmacy is still having phone issues. I left a voicemail with my direct number if they have any additional questions in regards to the approval.  Patient Notified: Yes: I left a message for the patient in regards to the approval and left my direct number and the PA Department's number [phone].

## 2020-01-10 DIAGNOSIS — F11.20 CONTINUOUS OPIOID DEPENDENCE (H): ICD-10-CM

## 2020-01-10 DIAGNOSIS — Z79.899 ENCOUNTER FOR LONG-TERM (CURRENT) USE OF HIGH-RISK MEDICATION: ICD-10-CM

## 2020-01-10 PROCEDURE — 80306 DRUG TEST PRSMV INSTRMNT: CPT | Performed by: ANESTHESIOLOGY

## 2020-02-28 ENCOUNTER — HOSPITAL ENCOUNTER (OUTPATIENT)
Dept: GENERAL RADIOLOGY | Facility: CLINIC | Age: 45
Discharge: HOME | End: 2020-02-28
Attending: PHYSICIAN ASSISTANT | Admitting: PHYSICIAN ASSISTANT
Payer: COMMERCIAL

## 2020-02-28 DIAGNOSIS — M54.50 LUMBAR SPINE PAIN: ICD-10-CM

## 2020-02-28 PROCEDURE — 72110 X-RAY EXAM L-2 SPINE 4/>VWS: CPT

## 2020-03-31 DIAGNOSIS — G89.4 CHRONIC PAIN SYNDROME: ICD-10-CM

## 2020-03-31 DIAGNOSIS — F11.20 UNCOMPLICATED OPIOID DEPENDENCE (H): ICD-10-CM

## 2020-03-31 RX ORDER — BUPRENORPHINE AND NALOXONE 8; 2 MG/1; MG/1
1 FILM, SOLUBLE BUCCAL; SUBLINGUAL 2 TIMES DAILY
Qty: 60 FILM | Refills: 2 | Status: CANCELLED | OUTPATIENT
Start: 2020-03-31

## 2020-03-31 NOTE — TELEPHONE ENCOUNTER
Controlled Substance Refill Request for buprenorphine HCl-naloxone HCl (SUBOXONE) 8-2 MG per film   Problem List Complete:  Yes   checked in past 3 months?  No, route to RN     Requested Prescriptions   Pending Prescriptions Disp Refills     buprenorphine HCl-naloxone HCl (SUBOXONE) 8-2 MG per film 60 Film 2     Sig: Place 1 Film under the tongue 2 times daily XDEA = [id number] BRAND NAME       There is no refill protocol information for this order

## 2020-04-01 DIAGNOSIS — M79.18 MYOFASCIAL PAIN: ICD-10-CM

## 2020-04-01 RX ORDER — METHOCARBAMOL 500 MG/1
500 TABLET, FILM COATED ORAL 3 TIMES DAILY
Qty: 90 TABLET | Refills: 0 | Status: SHIPPED | OUTPATIENT
Start: 2020-04-01 | End: 2020-05-08

## 2020-04-01 NOTE — TELEPHONE ENCOUNTER
Received fax request from   [id number] - [place], [place] - [address] [address]  [address] [facility] [address]  Phone: [phone] Fax: [phone]     pharmacy requesting refill(s) for methocarbamol (ROBAXIN) 500 MG tablet     Last refilled on 01/09/20    Pt last seen on 01/09/20    No future appointments scheduled at this time    Will facilitate refill.    Stacey Purvis [facility]  [place]

## 2020-04-01 NOTE — TELEPHONE ENCOUNTER
Suboxone refill request from pharmacy.    Last appt w/Dr Vallecillo: 1/9/20 with 3-month follow up recommended  Next appt: none scheduled    LVM for Diego to call clinic and schedule appt w/Dr Vallecillo. If he schedules with Dr Vallecillo, please obtain Suboxone bridge rx info and route to Addiction Med RNs for follow up.    Nancy Birch RN on 4/1/2020 at 12:07 PM

## 2020-04-02 ENCOUNTER — VIRTUAL VISIT (OUTPATIENT)
Dept: PALLIATIVE MEDICINE | Facility: CLINIC | Age: 45
End: 2020-04-02
Payer: COMMERCIAL

## 2020-04-02 DIAGNOSIS — F11.20 UNCOMPLICATED OPIOID DEPENDENCE (H): Primary | ICD-10-CM

## 2020-04-02 DIAGNOSIS — F13.20 BENZODIAZEPINE DEPENDENCE, CONTINUOUS (H): ICD-10-CM

## 2020-04-02 DIAGNOSIS — M54.16 ACUTE RIGHT LUMBAR RADICULOPATHY: ICD-10-CM

## 2020-04-02 DIAGNOSIS — Z79.899 ENCOUNTER FOR LONG-TERM (CURRENT) USE OF HIGH-RISK MEDICATION: ICD-10-CM

## 2020-04-02 DIAGNOSIS — G89.4 CHRONIC PAIN SYNDROME: ICD-10-CM

## 2020-04-02 DIAGNOSIS — F41.1 GAD (GENERALIZED ANXIETY DISORDER): ICD-10-CM

## 2020-04-02 PROCEDURE — 99442 ZZC PHYSICIAN TELEPHONE EVALUATION 11-20 MIN: CPT | Performed by: ANESTHESIOLOGY

## 2020-04-02 RX ORDER — BUPRENORPHINE AND NALOXONE 8; 2 MG/1; MG/1
1 FILM, SOLUBLE BUCCAL; SUBLINGUAL 2 TIMES DAILY
Qty: 60 FILM | Refills: 2 | Status: SHIPPED | OUTPATIENT
Start: 2020-04-02 | End: 2020-07-08

## 2020-04-02 RX ORDER — OXYCODONE AND ACETAMINOPHEN 10; 325 MG/1; MG/1
1 TABLET ORAL EVERY 6 HOURS PRN
Qty: 12 TABLET | Refills: 0 | Status: SHIPPED | OUTPATIENT
Start: 2020-04-02 | End: 2020-07-08

## 2020-04-02 RX ORDER — METOPROLOL SUCCINATE 50 MG/1
50 TABLET, EXTENDED RELEASE ORAL DAILY
COMMUNITY
Start: 2020-03-13 | End: 2020-07-08

## 2020-04-02 ASSESSMENT — PAIN SCALES - GENERAL: PAINLEVEL: SEVERE PAIN (7)

## 2020-04-02 NOTE — PROGRESS NOTES
"Diego Meng is a 44 year old male who is being evaluated via a billable telephone visit.      The patient has been notified of following:     \"This telephone visit will be conducted via a call between you and your physician/provider. We have found that certain health care needs can be provided without the need for a physical exam.  This service lets us provide the care you need with a short phone conversation.  If a prescription is necessary we can send it directly to your pharmacy.  If lab work is needed we can place an order for that and you can then stop by our lab to have the test done at a later time.    If during the course of the call the physician/provider feels a telephone visit is not appropriate, you will not be charged for this service.\"     Patient has given verbal consent for Telephone visit?  Yes    Diego Meng complains of    Chief Complaint   Patient presents with     RECHECK     med check       I have reviewed and updated the patient's Past Medical History, Social History, Family History and Medication List.    Naz CISSE CMA      ALLERGIES  Nsaids and Tolmetin                                 [facility]    Date of visit: 4/2/2020    Chief complaint:   Chief Complaint   Patient presents with     RECHECK     med check     Interval history:  Diego Meng is a 44 year old male last seen by me for follow up on 1/9/2020.      Brief history: 44 YEAR OLD MALE who began having low back pain at age 20.  Pain continued and progressed to neck and right shoulder pain for the last 20 years.  History of high dose opioids after having a L4-5 decompression at [facility] by Dr. Rios. Dose escalated and got \"out of control\".  He admits to overuse.  He was transitioned to buprenorphine by Dr. Almonte and was transferred to myself for ongoing care. PMHx is significant for anxiety.     Since his last visit, Diego Meng reports:  - Doing well  - He " "was moving office furniture last week and \"tweaked\" his back.  He has been having a lot of acute pain from this. Working long hours as an  in a [place]. Working from home since covid-19 outbreak.  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   - Surgery scheduled with Dr. Rios for May 19th.  He is going to have a disc decompression L5-S1.  This is less invasive than the original L4-S1 fusion with pelvic fixation for SI joint dysfunction.   - He has questions about post operative pain control.  He has a knee scope and meniscus repair in the past (10 months ago) and his pain management was poor. Specifically when immediately waking up from anesthesia.   - Previous pain flare 11/21/2019 and was given a one time prescription for oxycodone to take in addition to his suboxone.  The last time I did this was on 5/22/2019.   - Taking Robaxin daily.  He finds this really helpful and he has had no side effects.   - Did 4 sessions of PT at [facility] and it was not helpful for his low back pain. Helpful for his thoracic back pain which is not longer painful for him.  Therapist Jose was fantastic.    - Went a weekend without Suboxone because he ran out and forgot to call for a refill.  He realized how much it was helping with the pain.  Without it he was bedridden all weekend.  His pain was out of control.   - Not really participating in multidisciplinary care.  He saw Teresa Toledo - pain psychology on 7/11/2018.  Did not follow up  - He was tapering off his Klonopin with his PCP, Dr. Son, and having a hard time with the taper.  He went and found an new PCP at [facility], Dr. Francis,  Who has agreed to continue to prescribe this for him.  He has been on this for over 10 years and is willing to continue to taper but would like to do it slower. He is currently on 1.5mg Klonopin/day and wants to stay on this for awhile.  He was previously on 4mg daily. He knows this is making him feel " tired and sedated.    -He has had many different injections over the years none of which were helpful for more than a few days.        Pain scores:  Pain intensity on average is 6 (8 last visit) on a scale of 0-10.     Current pain treatments:   Suboxone 8-2 mg #2/day (BRAND NAME ONLY)  Excedrin #2-6/day for 20 years  lexapro 20 mg daily  Clonazepam 0.5mg #3/day  Robaxin 500-1000mg PRN QID    Labs: CRP <2.9, RF <20, ESR 6    Side Effects: no side effects    Past pain treatments:  Diego Meng has been seen at a pain clinic in the past. [facility] Dr. Vázquez and [facility]  Medications:     Medrol dose carolin -no relief              botox -no relief              Anti-migraine: fioricet, migranal, eletriptan, frovatriptan, midrin, naraptriptan, sumatriptan, zolmitripatn -no relief from any of these              Anti-convulsants: depakote, topiramate, gabapentin, lyrica -all of these made him feel fuzzy and forgetful and they were not helpful              Opioids: oxycontin, morphine -no relief, hydrocodone -no relief, tramadol -no relief                Muscle relaxants: soma -no relief, flexeril -no relief and made him tired, norflex -no relief, tizanidine -no relief and made him tired              NSAIDs: allergic to this class of medications -rash, abdominal pain and cramping, toradol -upset stomach              Anti-depressants: cymbalta -no relief                Topicals: lidocaine -no relief  PT: [facility] in 2016 -helped for shoulder but flared up back and neck  Psychology: yes for pain at the [facility] -'continues to use these skills'  Acupuncture: tried it -no relief  Chiropractic care: tried it -helped initially but then flared up pain  TENS Unit: no relief    Injections:   R L4 and L5 TF BAY 12/24/16 -flared pain  cervical epidural steroid injection -provided 2-4 weeks of relief but had significant flare up for 2 weeks post-procedure  cervical trigger  point injections -minimal relief  lumbar medial branch block -no relief  lumbar paraspinal trigger point injection 5/17/17 - not helpful  Thoracic TRIGGER POINT INJECTION 7/27/2018.     Surgery: right L4-L5 hemilaminectomy May 2015 Dr. Rios, Right knee scope 9/25/2018    Medications:  Current Outpatient Medications   Medication Sig Dispense Refill     atorvastatin (LIPITOR) 40 MG tablet Take 1 tablet (40 mg) by mouth daily 30 tablet 0     buprenorphine HCl-naloxone HCl (SUBOXONE) 8-2 MG per film Place 1 Film under the tongue 2 times daily XDEA = [id number] BRAND NAME 60 Film 2     clonazePAM (KLONOPIN) 1 MG tablet 25 tabs first two weeks (avg 1.8 tabs/day), 23 tabs second two weeks (avg 1.6 tabs/day) 48 tablet 0     escitalopram (LEXAPRO) 20 MG tablet Take 1 tablet (20 mg) by mouth daily 90 tablet 3     esomeprazole (NEXIUM) 20 MG capsule Take 1 capsule (20 mg) by mouth every morning (before breakfast) Take 30-60 minutes before eating. 30 capsule 0     methocarbamol (ROBAXIN) 500 MG tablet Take 1 tablet (500 mg) by mouth 3 times daily 90 tablet 0     metoprolol succinate ER (TOPROL-XL) 50 MG 24 hr tablet Take 50 mg by mouth daily For anxiety       omega 3 1000 MG CAPS Take 1 g by mouth daily 90 capsule      oxyCODONE-acetaminophen (PERCOCET) 5-325 MG tablet Take 1 tablet by mouth every 6 hours as needed for severe pain 12 tablet 0     lidocaine (XYLOCAINE) 5 % ointment Apply topically as needed for moderate pain (Patient not taking: Reported on 1/31/2019) 50 g 1     oxyCODONE-acetaminophen (PERCOCET)  MG per tablet Take 1 tablet by mouth every 6 hours as needed for severe pain (Patient not taking: Reported on 7/11/2019) 12 tablet 0       Medical History: any changes in medical history since they were last seen? no    Imaging:  THORACIC MRI 3/23/2018:       CERVICAL MRI completed on 7/14/19:  IMPRESSION:  Mild degenerative disc changes in the mid cervical spine  as described above. Mild neural foraminal  narrowings at C3-C4 and  C5-C6. No spinal canal narrowing at any level.     LUMBAR MRI completed on 7/14/19:  IMPRESSION:  Mild degenerative disc changes at L4-L5 and L5-S1. No  spinal canal or neural foraminal narrowing at any level. Overall,  there does not appear to be significant change since prior MR.    Right shoulder x-ray completed on 4/2/15:  FINDINGS: No evidence of fracture or dislocation. Normal glenohumeral joint alignment without evidence of degenerative changes. No evidence of significant acromioclavicular joint degenerative joint disease. No acromial or coracoid enthesophyte. Mild undersurface acromial sclerosis. Downslope type three acromion. No blastic or lytic lesions.  IMPRESSION:  Right shoulder type three acromion otherwise within normal limits.     [place] [facility] Data Base Reviewed: YES; as expected, no concern for abuse or misuse    Assessment:   1. Chronic pain syndrome - widespread myofascial pain  2. Opioid dependence - uncomplicated  3. Chronic low back pain.   S/P Right L4-L5 hemilaminectomy partial facetectomy in May 2015 with Dr. Rios.    4. Chronic neck pain. Cervical facet arthropathy.  5. Chronic headaches. Likely related to facet arthropathy, psychogenic, and rebound (excedrin)  6. Anxiety with panic attacks. Takes lexapro and clonazepam. Stable.  7. Benzodiazepine dependence - has been on Clonazepam for about 10 years without any abuse or misuse.  Plan is to taper off this over the next couple years secondary to side effects of sedation and memory loss.   8. Tobacco use disorder - currently wearing a patch and working on quiting   9. Chronic bilateral knee pain. Left knee meniscus repair on 11/28/16 with Dr. Kaufman.  Right knee scope 9/20/2018   10. Chronic Thoracic pain - Etiology likely myofascial pain. He has a normal MRI      Plan:  1. Pain Psychology - Recommended, however, the patient went to initial visit and no follow up.  If he continues to struggle to  cope with his pain I will recommend that he start to see Marilynn here at the  location.  This should be easier for him to get to with his busy work schedule.   2. PT - YES - recently finished a course of PT at [facility]  3. IMAGING-  Reviewed CERVICAL, THORACIC and LUMBAR MRI's   4. Medication Management:    1. repeat UDS TOX 13 at every OFFICE visit   2. Suboxone 8mg films -continue  BID    3. I have agreed to give him traditional opioids for pain flares #2-3 days worth only to be taken IN ADDITION to his Suboxone.  Agreed that this should only happen a few times a year.  Not a weekly or monthly thing.  5. OTHER: The jeanine-operatiove plan is to CONTINUE his suboxone.  Recommend regional anesthesia and intraoperative ketamine gtt.  Dr. Wood will need to manage his post operative pain.  Would recommend using twice the normal amount of post operative opioids for post op pain for twice as long as it is normally used.  If he has trouble I would be happy to prescribe for him or discuss the plan over the phone with dr. wood.        Follow up with Dr. Vallecillo - 3 months     Phone call contact time:   Call started at 0845  Call ended at 0900      MANJU VALLECILLO MD   Pain Management & Addiction Medicine

## 2020-04-03 NOTE — TELEPHONE ENCOUNTER
Diego had appt with Dr Vallecillo 4/2/20. Refill sent at that time.    Nancy Birch RN on 4/3/2020 at 11:37 AM

## 2020-05-08 DIAGNOSIS — M79.18 MYOFASCIAL PAIN: ICD-10-CM

## 2020-05-08 RX ORDER — METHOCARBAMOL 500 MG/1
500 TABLET, FILM COATED ORAL 3 TIMES DAILY
Qty: 90 TABLET | Refills: 0 | Status: SHIPPED | OUTPATIENT
Start: 2020-05-08 | End: 2020-07-08

## 2020-05-08 NOTE — TELEPHONE ENCOUNTER
Received fax request from [facility] pharmacy requesting refill(s) for methocarbamol (ROBAXIN) 500 MG tablet    Last refilled on 4/1/2020    Pt last seen on 4/2/2020  Next appt scheduled for none    Will facilitate refill.

## 2020-06-25 ENCOUNTER — TELEPHONE (OUTPATIENT)
Dept: PALLIATIVE MEDICINE | Facility: CLINIC | Age: 45
End: 2020-06-25

## 2020-06-25 NOTE — TELEPHONE ENCOUNTER
Pt LVM at 2:56 requesting a call back from the care team to discuss continued post operative pain. Pt is wondering if Dr. Vallecillo could prescribe him something. Pt can be reached at [phone].    Samina RABAGO    [facility]

## 2020-06-26 NOTE — TELEPHONE ENCOUNTER
Called pt and relayed information below. no further questions.     Trena MACIAS, RN Care Coordinator  [facility]  Pain [facility]

## 2020-06-26 NOTE — TELEPHONE ENCOUNTER
His surgeon will need to manage post - operative pain.  I do not have any records from his spine surgeon so I cannot even look to see what surgery was done, when or what has been given.  I would assume if he is 4 weeks out he should be able to transition back to just suboxone.  Recommend that he contact his surgeon for post op pain recommendations.     Reba Vallecillo MD

## 2020-06-26 NOTE — TELEPHONE ENCOUNTER
Called pt. Reports had back surgery 4 weeks ago. He is at the end of his prescription from [facility] for decompression laminectomy. Advised that typically surgeon handles post op meds. He was told this last refill was his last from them- he is now out. He reports that this is incisional pain-site is WNL, afebrile. States that surgery was helpful for he pain he had radiating to leg-this is gone. Has pain at work with sitting on it.   Was given Oxycodone 10mg, was taking 4-6 day. Still taking Suboxone as prescribed.   Advised that we typically do not write for post op pain but I would route for review and nursing would call him back accordingly      Trena MACIAS, RN Care Coordinator  [facility]  Pain Management

## 2020-07-08 ENCOUNTER — VIRTUAL VISIT (OUTPATIENT)
Dept: PALLIATIVE MEDICINE | Facility: CLINIC | Age: 45
End: 2020-07-08
Payer: COMMERCIAL

## 2020-07-08 DIAGNOSIS — G89.4 CHRONIC PAIN SYNDROME: ICD-10-CM

## 2020-07-08 DIAGNOSIS — F41.1 GAD (GENERALIZED ANXIETY DISORDER): ICD-10-CM

## 2020-07-08 DIAGNOSIS — M79.18 MYOFASCIAL PAIN: ICD-10-CM

## 2020-07-08 DIAGNOSIS — Z79.899 ENCOUNTER FOR LONG-TERM (CURRENT) USE OF HIGH-RISK MEDICATION: ICD-10-CM

## 2020-07-08 DIAGNOSIS — F11.20 UNCOMPLICATED OPIOID DEPENDENCE (H): Primary | ICD-10-CM

## 2020-07-08 DIAGNOSIS — F13.20 BENZODIAZEPINE DEPENDENCE, CONTINUOUS (H): ICD-10-CM

## 2020-07-08 PROCEDURE — 99214 OFFICE O/P EST MOD 30 MIN: CPT | Mod: 95 | Performed by: ANESTHESIOLOGY

## 2020-07-08 RX ORDER — BUPRENORPHINE AND NALOXONE 8; 2 MG/1; MG/1
1 FILM, SOLUBLE BUCCAL; SUBLINGUAL 2 TIMES DAILY
Qty: 60 FILM | Refills: 2 | Status: SHIPPED | OUTPATIENT
Start: 2020-07-08 | End: 2020-10-01

## 2020-07-08 RX ORDER — METHOCARBAMOL 500 MG/1
500 TABLET, FILM COATED ORAL 3 TIMES DAILY
Qty: 90 TABLET | Refills: 2 | Status: SHIPPED | OUTPATIENT
Start: 2020-07-08 | End: 2021-03-31

## 2020-07-08 ASSESSMENT — PAIN SCALES - GENERAL: PAINLEVEL: MODERATE PAIN (4)

## 2020-07-08 NOTE — PROGRESS NOTES
"Diego Meng is a 44 year old male who is being evaluated via a billable video visit.      The patient has been notified of following:     \"This video visit will be conducted via a call between you and your physician/provider. We have found that certain health care needs can be provided without the need for an in-person physical exam.  This service lets us provide the care you need with a video conversation.  If a prescription is necessary we can send it directly to your pharmacy.  If lab work is needed we can place an order for that and you can then stop by our lab to have the test done at a later time.    Video visits are billed at different rates depending on your insurance coverage.  Please reach out to your insurance provider with any questions.    If during the course of the call the physician/provider feels a video visit is not appropriate, you will not be charged for this service.\"    Patient has given verbal consent for Video visit? Yes  How would you like to obtain your AVS? LilliEugene  Patient would like the video invitation sent by: Send to e-mail at: [email or web address]  Will anyone else be joining your video visit? No        Video-Visit Details    Type of service:  Video Visit    Video Start Time: 2:09 PM  Video End Time: 2:28 PM    Originating Location (pt. Location): Home    Distant Location (provider location):  [facility]     Platform used for Video Visit: Adolfo Gutierrez [facility]    Date of visit: 7/8/2020    Chief complaint:   Chief Complaint   Patient presents with     Pain     Interval history:  Diego Meng is a 44 year old male last seen by me for follow up on 4/2/2020.      Brief history: 44 YEAR OLD MALE who began having low back pain at age 20.  Pain continued and progressed to neck and right shoulder pain for the last 20 years.  History of high dose opioids after having a L4-5 " "decompression at [facility] by Dr. Rios. Dose escalated and got \"out of control\".  He admits to overuse.  He was transitioned to buprenorphine by Dr. Almonte and was transferred to myself for ongoing care. PMHx is significant for anxiety.     Since his last visit, Diego Meng reports:  - Doing well  - He had surgery with dr. Rios.  S/P L5-S1 right foraminotomy May 19th, 2020 at [facility].  He had some trouble with pain control after the surgery and needed opioids for a prolonged period of time. His radiating leg pain has resolved completely.   - The pain he continues to have is central low back pain now.   - He went off his suboxone the night before surgery and started it again about a week post-operatively.  He feels like the pain was pretty well controlled.  When he did restart it he felt like the oxycodone did not work so he stopped it again.  He has been back on Suboxone for a couple weeks now.   - He is wondering if she should stop taking suboxone and take \"something else\" for this pain because now that he stopped it for awhile he doesn't think it is helping very much.   - He has been tired lately and wonders if this is a side effect of the Suboxone.  He has had some issues with tooth decay also.  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   - Working long hours as an  in a firm [place]. Working from home since covid-19 outbreak.  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   - Previous pain flare 11/21/2019 and was given a one time prescription for oxycodone to take in addition to his suboxone.  The last time I did this was on 5/22/2019.   - Taking Robaxin daily.  He finds this really helpful and he has had no side effects. He needs a refill  - Did 4 sessions of PT at [facility] and it was not helpful for his low back pain. Helpful for his thoracic back pain which is not " longer painful for him.  Therapist Jose was fantastic.     - Not really participating in multidisciplinary care.  He saw Teresa Toledo - pain psychology on 7/11/2018.  Did not follow up  - He was tapering off his Klonopin with his PCP, Dr. Son, and having a hard time with the taper.  He went and found an new PCP at [facility], Dr. Francis,  Who has agreed to continue to prescribe this for him.  He has been on this for over 10 years and is willing to continue to taper but would like to do it slower. He is currently on 1.5mg Klonopin/day and wants to stay on this for awhile.  He was previously on 4mg daily. He knows this is making him feel tired and sedated.    -He has had many different injections over the years none of which were helpful for more than a few days.        Pain scores:  Pain intensity on average is 6 (8 last visit) on a scale of 0-10.     Current pain treatments:   Suboxone 8-2 mg #2/day (BRAND NAME ONLY)  Excedrin #2-6/day for 20 years  lexapro 20 mg daily  Clonazepam 0.5mg #3/day  Robaxin 500-1000mg PRN QID    Labs: CRP <2.9, RF <20, ESR 6    Side Effects: no side effects    Past pain treatments:  Diego Meng has been seen at a pain clinic in the past. [facility] Dr. Vázquez and [facility]  Medications:     Medrol dose carolin -no relief              botox -no relief              Anti-migraine: fioricet, migranal, eletriptan, frovatriptan, midrin, naraptriptan, sumatriptan, zolmitripatn -no relief from any of these              Anti-convulsants: depakote, topiramate, gabapentin, lyrica -all of these made him feel fuzzy and forgetful and they were not helpful              Opioids: oxycontin, morphine -no relief, hydrocodone -no relief, tramadol -no relief                Muscle relaxants: soma -no relief, flexeril -no relief and made him tired, norflex -no relief, tizanidine -no relief and made him tired              NSAIDs: allergic to this class of medications -rash,  abdominal pain and cramping, toradol -upset stomach              Anti-depressants: cymbalta -no relief                Topicals: lidocaine -no relief  PT: [facility] in 2016 -helped for shoulder but flared up back and neck  Psychology: yes for pain at the [facility] -'continues to use these skills'  Acupuncture: tried it -no relief  Chiropractic care: tried it -helped initially but then flared up pain  TENS Unit: no relief    Injections:   R L4 and L5 TF BAY 12/24/16 -flared pain  cervical epidural steroid injection -provided 2-4 weeks of relief but had significant flare up for 2 weeks post-procedure  cervical trigger point injections -minimal relief  lumbar medial branch block -no relief  lumbar paraspinal trigger point injection 5/17/17 - not helpful  Thoracic TRIGGER POINT INJECTION 7/27/2018.     Surgery: Right L5-S1 foraminotomy Dr. Rios May 19th, 2020.   right L4-L5 hemilaminectomy May 2015 Dr. Rios, Right knee scope 9/25/2018    Medications:  Current Outpatient Medications   Medication Sig Dispense Refill     atorvastatin (LIPITOR) 40 MG tablet Take 1 tablet (40 mg) by mouth daily 30 tablet 0     buprenorphine HCl-naloxone HCl (SUBOXONE) 8-2 MG per film Place 1 Film under the tongue 2 times daily XDEA = [id number] BRAND NAME 60 Film 2     clonazePAM (KLONOPIN) 1 MG tablet 25 tabs first two weeks (avg 1.8 tabs/day), 23 tabs second two weeks (avg 1.6 tabs/day) 48 tablet 0     escitalopram (LEXAPRO) 20 MG tablet Take 1 tablet (20 mg) by mouth daily 90 tablet 3     esomeprazole (NEXIUM) 20 MG capsule Take 1 capsule (20 mg) by mouth every morning (before breakfast) Take 30-60 minutes before eating. 30 capsule 0     lidocaine (XYLOCAINE) 5 % ointment Apply topically as needed for moderate pain 50 g 1     methocarbamol (ROBAXIN) 500 MG tablet Take 1 tablet (500 mg) by mouth 3 times daily 90 tablet 0     metoprolol succinate ER (TOPROL-XL) 50 MG 24 hr tablet Take 50 mg by  mouth daily For anxiety       omega 3 1000 MG CAPS Take 1 g by mouth daily 90 capsule      oxyCODONE-acetaminophen (PERCOCET)  MG per tablet Take 1 tablet by mouth every 6 hours as needed for severe pain (Patient not taking: Reported on 7/8/2020) 12 tablet 0       Medical History: any changes in medical history since they were last seen? no    Imaging:  THORACIC MRI 3/23/2018:       CERVICAL MRI completed on 7/14/19:  IMPRESSION:  Mild degenerative disc changes in the mid cervical spine  as described above. Mild neural foraminal narrowings at C3-C4 and  C5-C6. No spinal canal narrowing at any level.     LUMBAR MRI completed on 7/14/19:  IMPRESSION:  Mild degenerative disc changes at L4-L5 and L5-S1. No  spinal canal or neural foraminal narrowing at any level. Overall,  there does not appear to be significant change since prior MR.    Right shoulder x-ray completed on 4/2/15:  FINDINGS: No evidence of fracture or dislocation. Normal glenohumeral joint alignment without evidence of degenerative changes. No evidence of significant acromioclavicular joint degenerative joint disease. No acromial or coracoid enthesophyte. Mild undersurface acromial sclerosis. Downslope type three acromion. No blastic or lytic lesions.  IMPRESSION:  Right shoulder type three acromion otherwise within normal limits.     [place] Board of Pharmacy Data Base Reviewed: YES; as expected, no concern for abuse or misuse    ROS:  Constitutional, neuro, ENT, endocrine, pulmonary, cardiac, gastrointestinal, genitourinary, musculoskeletal, integument and psychiatric systems are negative, except as otherwise noted.      GENERAL: Healthy, alert and no distress  EYES: Eyes grossly normal to inspection.  No discharge or erythema, or obvious scleral/conjunctival abnormalities.  RESP: No audible wheeze, cough, or visible cyanosis.  No visible retractions or increased work of breathing.    SKIN: Visible skin clear. No significant rash, abnormal  pigmentation or lesions.  NEURO: Cranial nerves grossly intact.  Mentation and speech appropriate for age.  PSYCH: Mentation appears normal, affect normal/bright, judgement and insight intact, normal speech and appearance well-groomed.      Assessment:   1. Chronic pain syndrome - widespread myofascial pain  2. Opioid dependence - uncomplicated  3. Chronic central low back pain.    4. Resolved chronic radiating leg pain - S/P L5-S1 right forminotomy Dr. Rios May 19th, 2020.  S/P Right L4-L5 hemilaminectomy partial facetectomy in May 2015 with Dr. Rios.    5. Chronic neck pain. Cervical facet arthropathy.  6. Chronic headaches. Likely related to facet arthropathy, psychogenic, and rebound (excedrin)  7. Anxiety with panic attacks. Takes lexapro and clonazepam. Benzodiazepine dependence.   8. Tobacco use disorder - currently wearing a patch and working on quiting   9. Chronic bilateral knee pain. Left knee meniscus repair on 11/28/16 with Dr. Kaufman.  Right knee scope 9/20/2018   10. Chronic Thoracic pain - Etiology likely myofascial pain. He has a normal MRI      Plan:  1. Pain Psychology - Recommended, however, the patient went to initial visit and no follow up.  If he continues to struggle to cope with his pain I will recommend that he start to see Marilynn here at the  location.  This should be easier for him to get to with his busy work schedule.   2. PT - YES - recently finished a course of PT at [facility]  3. IMAGING-  Reviewed CERVICAL, THORACIC and LUMBAR MRI's   4. Medication Management:    1. repeat UDS TOX 13 at every OFFICE visit   2. Suboxone 8mg films -continue  BID.  We discussed the Butrans patch and he was interested in swiching to this because he was on it in the past and it was helpful.  He would need to get down to 2mg a day of Suboxone prior to making this switch.    3. I had agreed in the past to give him traditional opioids for pain flares #2-3 days worth only to be taken IN  ADDITION to his Suboxone.  Agreed that this should only happen a few times a year.  I am no longer willing to do this (see below) as I am concerned he is abusing this.    4. He overused his Klonopin last month.  He discontinued his Suboxone for his surgery to get more of an effect from the oxycodone which was not the plan we discussed.  He took oxycodone for more than 6 weeks post-op which is not typically for the type of surgery he had.  In addition, he spent today's visit discussing discontinuing suboxone so he can go back on traditional opioids. This is concerning behavior and will need to be monitored closely.  I am concerned about addiction.  We did discuss that going back on traditional opioids for chronic pain will never be an option.        Follow up with Dr. Keith - 3 months  10/1/2020 @ 0830      MANJU KEITH MD   Pain Management & Addiction Medicine

## 2020-07-22 ENCOUNTER — ANCILLARY PROCEDURE (OUTPATIENT)
Dept: GENERAL RADIOLOGY | Facility: CLINIC | Age: 45
End: 2020-07-22
Attending: ORTHOPAEDIC SURGERY
Payer: COMMERCIAL

## 2020-07-22 ENCOUNTER — OFFICE VISIT (OUTPATIENT)
Dept: ORTHOPEDICS | Facility: CLINIC | Age: 45
End: 2020-07-22
Payer: COMMERCIAL

## 2020-07-22 VITALS
HEIGHT: 71 IN | SYSTOLIC BLOOD PRESSURE: 120 MMHG | DIASTOLIC BLOOD PRESSURE: 78 MMHG | WEIGHT: 224 LBS | BODY MASS INDEX: 31.36 KG/M2

## 2020-07-22 DIAGNOSIS — M25.562 ACUTE PAIN OF LEFT KNEE: ICD-10-CM

## 2020-07-22 DIAGNOSIS — M25.562 ACUTE PAIN OF LEFT KNEE: Primary | ICD-10-CM

## 2020-07-22 DIAGNOSIS — M17.12 PRIMARY OSTEOARTHRITIS OF LEFT KNEE: ICD-10-CM

## 2020-07-22 PROCEDURE — 99213 OFFICE O/P EST LOW 20 MIN: CPT | Performed by: ORTHOPAEDIC SURGERY

## 2020-07-22 PROCEDURE — 73562 X-RAY EXAM OF KNEE 3: CPT | Mod: LT | Performed by: ORTHOPAEDIC SURGERY

## 2020-07-22 ASSESSMENT — MIFFLIN-ST. JEOR: SCORE: 1930.25

## 2020-07-22 NOTE — LETTER
7/22/2020         RE: Diego Meng  [address]  [place] [address]        Dear Colleague,    Thank you for referring your patient, Diego Meng, to the [facility]. Please see a copy of my visit note below.    HISTORY OF PRESENT ILLNESS:    Diego Meng is a 44 year old male who is seen in follow-up for left knee pain.  Acute onset of back pain June 1, 2020.  Patient reports spinal surgery 5/19/2020 that was causing him to change his gait. He reports having acute knee and ankle pain due to pain.   Patient has history of left knee arthroscopy with medial meniscectomy, DOS: 11/28/16, Dr. Kaufman.  Patient currently working as [employer]     Present symptoms: left knee pain, sharp lateral joint line pain. Pain is constant. Denies catching and locking. Patient reports mild swelling of the left knee. Patient reports increased pain with end range extension.   Current pain level: 6/10, Worst pain level: 8/10   Treatments tried to this point: icing,  ibuprofen, suboxone due to chronic low back pain.   Orthopedic PMH: right knee arthroscopy, DOS 9/7/18, Dr. Kaufman.     Past Medical History:   Diagnosis Date     Anxiety      Chronic radicular lumbar pain      Opioid dependence (H)      Other chronic pain        Past Surgical History:   Procedure Laterality Date     ARTHROSCOPY KNEE WITH MEDIAL MENISCECTOMY Left 11/28/2016    Procedure: ARTHROSCOPY KNEE WITH MEDIAL MENISCECTOMY;  Surgeon: Geovani Kaufman MD;  Location: [facility] OR     BACK SURGERY  5/2015    laminectomy, discectomy L4-L5     ORTHOPEDIC SURGERY Right 09/07/2018    Right Knee arthroscopy, partial med meniscectomy, DOS 9/7/2018, Dr. Kaufman. [facility]       Family History   Problem Relation Age of Onset     Diabetes Father      Diabetes Paternal Grandfather      Hyperlipidemia Sister      Coronary Artery Disease No family hx of      Colon Cancer No family hx of      Prostate Cancer No  family hx of        Social History     Socioeconomic History     Marital status:      Spouse name: Not on file     Number of children: Not on file     Years of education: Not on file     Highest education level: Not on file   Occupational History     Not on file   Social Needs     Financial resource strain: Not on file     Food insecurity     Worry: Not on file     Inability: Not on file     Transportation needs     Medical: Not on file     Non-medical: Not on file   Tobacco Use     Smoking status: Former Smoker     Packs/day: 1.00     Years: 20.00     Pack years: 20.00     Types: Cigarettes     Last attempt to quit: 2018     Years since quittin.9     Smokeless tobacco: Never Used     Tobacco comment: patch   Substance and Sexual Activity     Alcohol use: No     Alcohol/week: 0.0 standard drinks     Drug use: No     Sexual activity: Yes   Lifestyle     Physical activity     Days per week: Not on file     Minutes per session: Not on file     Stress: Not on file   Relationships     Social connections     Talks on phone: Not on file     Gets together: Not on file     Attends [employer] service: Not on file     Active member of club or organization: Not on file     Attends meetings of clubs or organizations: Not on file     Relationship status: Not on file     Intimate partner violence     Fear of current or ex partner: Not on file     Emotionally abused: Not on file     Physically abused: Not on file     Forced sexual activity: Not on file   Other Topics Concern     Parent/sibling w/ CABG, MI or angioplasty before 65F 55M? No   Social History Narrative     Not on file       Current Outpatient Medications   Medication Sig Dispense Refill     atorvastatin (LIPITOR) 40 MG tablet Take 1 tablet (40 mg) by mouth daily 30 tablet 0     buprenorphine HCl-naloxone HCl (SUBOXONE) 8-2 MG per film Place 1 Film under the tongue 2 times daily XDEA = [id number] BRAND NAME 60 Film 2     clonazePAM (KLONOPIN) 1 MG tablet  "25 tabs first two weeks (avg 1.8 tabs/day), 23 tabs second two weeks (avg 1.6 tabs/day) 48 tablet 0     escitalopram (LEXAPRO) 20 MG tablet Take 1 tablet (20 mg) by mouth daily 90 tablet 3     esomeprazole (NEXIUM) 20 MG capsule Take 1 capsule (20 mg) by mouth every morning (before breakfast) Take 30-60 minutes before eating. 30 capsule 0     methocarbamol (ROBAXIN) 500 MG tablet Take 1 tablet (500 mg) by mouth 3 times daily 90 tablet 2     omega 3 1000 MG CAPS Take 1 g by mouth daily 90 capsule        Allergies   Allergen Reactions     Nsaids Rash     Tolmetin Rash       REVIEW OF SYSTEMS:  CONSTITUTIONAL:  NEGATIVE for fever, chills, change in weight  INTEGUMENTARY/SKIN:  NEGATIVE for worrisome rashes, moles or lesions  EYES:  NEGATIVE for vision changes or irritation  ENT/MOUTH:  NEGATIVE for ear, mouth and throat problems  RESP:  NEGATIVE for significant cough or SOB  BREAST:  NEGATIVE for masses, tenderness or discharge  CV:  NEGATIVE for chest pain, palpitations or peripheral edema  GI: reflux, heartburn  :  Negative   MUSCULOSKELETAL:  See HPI above  NEURO:  NEGATIVE for weakness, dizziness or paresthesias  ENDOCRINE:  NEGATIVE for temperature intolerance, skin/hair changes  HEME/ALLERGY/IMMUNE:  NEGATIVE for bleeding problems  PSYCHIATRIC:  Panic attacks    PHYSICAL EXAM:  /78 (BP Location: Right arm, Patient Position: Chair)   Ht 1.791 m (5' 10.5\")   Wt 101.6 kg (224 lb)   BMI 31.69 kg/m    Body mass index is 31.69 kg/m .   GENERAL APPEARANCE: healthy, alert and no distress   HEENT: No apparent thyroid megaly. Clear sclera with normal ocular movement  RESPIRATORY: No labored breathing  SKIN: no suspicious lesions or rashes  NEURO: Normal strength and tone, mentation intact and speech normal  VASCULAR: Good pulses, and capillary refill   LYMPH: no lymphadenopathy   PSYCH:  mentation appears normal and affect normal/bright    MUSCULOSKELETAL:  Not in acute distress   walks with a slight limp  No " deformities in the knees  No noticeable swelling or erythema  Full range of motion in both knees  Left knee with pain in full extension  By palpation, he pain is at the anterolateral aspect of the knee  Lateral joint line is not tender  Negative Kamar's  Mild patellofemoral crepitus, bilateral  No significant pain with patellofemoral compression of the right knee in comparison to the left knee  Extensor mechanism is intact         ASSESSMENT:  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  For the time being, patellofemoral joint is most symptomatic.  History of knee arthroscopy in the medial compartment.    PLAN:  The x-ray images of the left knee from today were visualized.  He does have some degenerative changes of early stage for both medial and the anterior compartments.  We then had a long discussion as to the nature of arthritis.  Treatment options of exercise biking, over-the-counter medications and a gentle quadricep strengthening were explained.  We also discussed the possibility of cortisone injection.  With recent back surgery, how he walked affected his chronic underlying condition.  Hopefully with time, his pain will subside.  If it does not, we will explore the option of cortisone injection at that point.  Informational materials provided today.  Follow-up as needed.      Imaging Interpretation:   Early degenerative changes of the anterior and medial compartments.  Otherwise, no acute changes noted.      Serafin Kramer MD  Department of Orthopedic Surgery        Disclaimer: This note consists of symbols derived from keyboarding, dictation and/or voice recognition software. As a result, there may be errors in the script that have gone undetected. Please consider this when interpreting information found in this chart.      Again, thank you for allowing me to participate in the care of your patient.        Sincerely,        Serafin Kramer MD

## 2020-07-22 NOTE — PROGRESS NOTES
HISTORY OF PRESENT ILLNESS:    Diego Meng is a 44 year old male who is seen in follow-up for left knee pain.  Acute onset of back pain June 1, 2020.  Patient reports spinal surgery 5/19/2020 that was causing him to change his gait. He reports having acute knee and ankle pain due to pain.   Patient has history of left knee arthroscopy with medial meniscectomy, DOS: 11/28/16, Dr. Kaufman.  Patient currently working as [employer]     Present symptoms: left knee pain, sharp lateral joint line pain. Pain is constant. Denies catching and locking. Patient reports mild swelling of the left knee. Patient reports increased pain with end range extension.   Current pain level: 6/10, Worst pain level: 8/10   Treatments tried to this point: icing,  ibuprofen, suboxone due to chronic low back pain.   Orthopedic PMH: right knee arthroscopy, DOS 9/7/18, Dr. Kaufman.     Past Medical History:   Diagnosis Date     Anxiety      Chronic radicular lumbar pain      Opioid dependence (H)      Other chronic pain        Past Surgical History:   Procedure Laterality Date     ARTHROSCOPY KNEE WITH MEDIAL MENISCECTOMY Left 11/28/2016    Procedure: ARTHROSCOPY KNEE WITH MEDIAL MENISCECTOMY;  Surgeon: Geovani Kaufman MD;  Location: [facility] OR     BACK SURGERY  5/2015    laminectomy, discectomy L4-L5     ORTHOPEDIC SURGERY Right 09/07/2018    Right Knee arthroscopy, partial med meniscectomy, DOS 9/7/2018, Dr. Kaufman. [facility]       Family History   Problem Relation Age of Onset     Diabetes Father      Diabetes Paternal Grandfather      Hyperlipidemia Sister      Coronary Artery Disease No family hx of      Colon Cancer No family hx of      Prostate Cancer No family hx of        Social History     Socioeconomic History     Marital status:      Spouse name: Not on file     Number of children: Not on file     Years of education: Not on file     Highest education level: Not on file   Occupational History     Not on  file   Social Needs     Financial resource strain: Not on file     Food insecurity     Worry: Not on file     Inability: Not on file     Transportation needs     Medical: Not on file     Non-medical: Not on file   Tobacco Use     Smoking status: Former Smoker     Packs/day: 1.00     Years: 20.00     Pack years: 20.00     Types: Cigarettes     Last attempt to quit: 2018     Years since quittin.9     Smokeless tobacco: Never Used     Tobacco comment: patch   Substance and Sexual Activity     Alcohol use: No     Alcohol/week: 0.0 standard drinks     Drug use: No     Sexual activity: Yes   Lifestyle     Physical activity     Days per week: Not on file     Minutes per session: Not on file     Stress: Not on file   Relationships     Social connections     Talks on phone: Not on file     Gets together: Not on file     Attends [employer] service: Not on file     Active member of club or organization: Not on file     Attends meetings of clubs or organizations: Not on file     Relationship status: Not on file     Intimate partner violence     Fear of current or ex partner: Not on file     Emotionally abused: Not on file     Physically abused: Not on file     Forced sexual activity: Not on file   Other Topics Concern     Parent/sibling w/ CABG, MI or angioplasty before 65F 55M? No   Social History Narrative     Not on file       Current Outpatient Medications   Medication Sig Dispense Refill     atorvastatin (LIPITOR) 40 MG tablet Take 1 tablet (40 mg) by mouth daily 30 tablet 0     buprenorphine HCl-naloxone HCl (SUBOXONE) 8-2 MG per film Place 1 Film under the tongue 2 times daily XDEA = [id number] BRAND NAME 60 Film 2     clonazePAM (KLONOPIN) 1 MG tablet 25 tabs first two weeks (avg 1.8 tabs/day), 23 tabs second two weeks (avg 1.6 tabs/day) 48 tablet 0     escitalopram (LEXAPRO) 20 MG tablet Take 1 tablet (20 mg) by mouth daily 90 tablet 3     esomeprazole (NEXIUM) 20 MG capsule Take 1 capsule (20 mg) by mouth every  "morning (before breakfast) Take 30-60 minutes before eating. 30 capsule 0     methocarbamol (ROBAXIN) 500 MG tablet Take 1 tablet (500 mg) by mouth 3 times daily 90 tablet 2     omega 3 1000 MG CAPS Take 1 g by mouth daily 90 capsule        Allergies   Allergen Reactions     Nsaids Rash     Tolmetin Rash       REVIEW OF SYSTEMS:  CONSTITUTIONAL:  NEGATIVE for fever, chills, change in weight  INTEGUMENTARY/SKIN:  NEGATIVE for worrisome rashes, moles or lesions  EYES:  NEGATIVE for vision changes or irritation  ENT/MOUTH:  NEGATIVE for ear, mouth and throat problems  RESP:  NEGATIVE for significant cough or SOB  BREAST:  NEGATIVE for masses, tenderness or discharge  CV:  NEGATIVE for chest pain, palpitations or peripheral edema  GI: reflux, heartburn  :  Negative   MUSCULOSKELETAL:  See HPI above  NEURO:  NEGATIVE for weakness, dizziness or paresthesias  ENDOCRINE:  NEGATIVE for temperature intolerance, skin/hair changes  HEME/ALLERGY/IMMUNE:  NEGATIVE for bleeding problems  PSYCHIATRIC:  Panic attacks    PHYSICAL EXAM:  /78 (BP Location: Right arm, Patient Position: Chair)   Ht 1.791 m (5' 10.5\")   Wt 101.6 kg (224 lb)   BMI 31.69 kg/m    Body mass index is 31.69 kg/m .   GENERAL APPEARANCE: healthy, alert and no distress   HEENT: No apparent thyroid megaly. Clear sclera with normal ocular movement  RESPIRATORY: No labored breathing  SKIN: no suspicious lesions or rashes  NEURO: Normal strength and tone, mentation intact and speech normal  VASCULAR: Good pulses, and capillary refill   LYMPH: no lymphadenopathy   PSYCH:  mentation appears normal and affect normal/bright    MUSCULOSKELETAL:  Not in acute distress   walks with a slight limp  No deformities in the knees  No noticeable swelling or erythema  Full range of motion in both knees  Left knee with pain in full extension  By palpation, he pain is at the anterolateral aspect of the knee  Lateral joint line is not tender  Negative Kamar's  Mild " patellofemoral crepitus, bilateral  No significant pain with patellofemoral compression of the right knee in comparison to the left knee  Extensor mechanism is intact         ASSESSMENT:  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  For the time being, patellofemoral joint is most symptomatic.  History of knee arthroscopy in the medial compartment.    PLAN:  The x-ray images of the left knee from today were visualized.  He does have some degenerative changes of early stage for both medial and the anterior compartments.  We then had a long discussion as to the nature of arthritis.  Treatment options of exercise biking, over-the-counter medications and a gentle quadricep strengthening were explained.  We also discussed the possibility of cortisone injection.  With recent back surgery, how he walked affected his chronic underlying condition.  Hopefully with time, his pain will subside.  If it does not, we will explore the option of cortisone injection at that point.  Informational materials provided today.  Follow-up as needed.      Imaging Interpretation:   Early degenerative changes of the anterior and medial compartments.  Otherwise, no acute changes noted.      Serafin Kramer MD  Department of Orthopedic Surgery        Disclaimer: This note consists of symbols derived from keyboarding, dictation and/or voice recognition software. As a result, there may be errors in the script that have gone undetected. Please consider this when interpreting information found in this chart.

## 2020-09-30 NOTE — PROGRESS NOTES
"[facility]    Date of visit: 10/1/2020    Chief complaint:   Chief Complaint   Patient presents with     Pain     Diego Paintingnikolaiberta is a 45 year old male last seen by me for VIRTUAL follow up on 7/8/2020.      Brief history: 45 YEAR OLD MALE who began having low back pain at age 20.  Pain continued and progressed to neck and right shoulder pain for the last 20 years.  History of high dose opioids after having a L4-5 decompression at [facility] by Dr. Rios. Dose escalated and got \"out of control\".  He admits to overuse.  He was transitioned to buprenorphine by Dr. Almonte and was transferred to myself for ongoing care. PMHx is significant for anxiety.     Since his last visit, Diego Paintingfabianomike reports:  - Doing well  - Feels like the pain he had prior to surgery (back-buttock-leg on the right side) is gone since the surgery on May. S/P L5-S1 right foraminotomy May 19th, 2020 at [facility].   - Has residual pain in the low mid back   - Had MRI C Spine done for neck pain/left arm pain, has not heard results yet. Has an injury for 20 years from a lifting injury, had R arm pain for years but now involves left  - Thinks degree of pain and loss of function is worse in the L arm  - Ice helps with the pain  - Suboxone: 2 tabs per day (8mg tabs)  - Taking Robaxin twice per day since neck pain has flared  - EFFEXOR 37.5mg daily was added by his PCP at .  She also increased his Klonopin to #4 tabs on weekdays and #2 tabs on weekends.  He has not been able to taper off this despite trials at decreasing his dose. He knows that it makes him feel tired and sedated. He is currently using 3 klonopin per day. No plans for titration on the Effexor, does think it helps  - Will be seeing a psychiatrist on Monday to help with medication management.  Dr. Karl Churchill.    - Working long hours as an  in a firm [employer]. Working from home since covid-19 " outbreak.  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   - Did 4 sessions of PT at [facility] and it was not helpful for his low back pain. Helpful for his thoracic back pain which is not longer painful for him.  Therapist Jose was fantastic.     - Not really participating in multidisciplinary care.  He saw Teresa Toledo - pain psychology on 7/11/2018.  Did not follow up        Pain scores:  Pain intensity on average is 6 (8 last visit) on a scale of 0-10.     Current pain treatments:   Suboxone 8-2 mg #2/day (BRAND NAME ONLY)  Excedrin #2-6/day for 20 years  lexapro 20 mg daily  Clonazepam 0.5mg #3/day  Robaxin 500-1000mg PRN QID  EFFEXOR 37.5MG PO DAILY    Labs: CRP <2.9, RF <20, ESR 6    Side Effects: no side effects from suboxone    Past pain treatments:  Diego DENICE Meng has been seen at a pain clinic in the past. [facility] Dr. Vázquez and [facility]  Medications:     Medrol dose carolin -no relief              botox -no relief              Anti-migraine: fioricet, migranal, eletriptan, frovatriptan, midrin, naraptriptan, sumatriptan, zolmitripatn -no relief from any of these              Anti-convulsants: depakote, topiramate, gabapentin, lyrica -all of these made him feel fuzzy and forgetful and they were not helpful              Opioids: oxycontin, morphine -no relief, hydrocodone -no relief, tramadol -no relief                Muscle relaxants: soma -no relief, flexeril -no relief and made him tired, norflex -no relief, tizanidine -no relief and made him tired              NSAIDs: allergic to this class of medications -rash, abdominal pain and cramping, toradol -upset stomach              Anti-depressants: cymbalta -no relief                Topicals: lidocaine -no relief  PT: [facility] in 2016 -helped for shoulder but flared up back and neck  Psychology: yes for pain at the [facility] -'continues to use these skills'  Acupuncture: tried it  -no relief  Chiropractic care: tried it -helped initially but then flared up pain  TENS Unit: no relief    Injections:   R L4 and L5 TF BAY 12/24/16 -flared pain  cervical epidural steroid injection -provided 2-4 weeks of relief but had significant flare up for 2 weeks post-procedure  cervical trigger point injections -minimal relief  lumbar medial branch block -no relief  lumbar paraspinal trigger point injection 5/17/17 - not helpful  Thoracic TRIGGER POINT INJECTION 7/27/2018.     Surgery: Right L5-S1 foraminotomy Dr. Rios May 19th, 2020.   right L4-L5 hemilaminectomy May 2015 Dr. Rios, Right knee scope 9/25/2018    Medications:  Current Outpatient Medications   Medication Sig Dispense Refill     atorvastatin (LIPITOR) 40 MG tablet Take 1 tablet (40 mg) by mouth daily 30 tablet 0     buprenorphine HCl-naloxone HCl (SUBOXONE) 8-2 MG per film Place 1 Film under the tongue 2 times daily XDEA = [id number] BRAND NAME 60 Film 2     clonazePAM (KLONOPIN) 1 MG tablet 25 tabs first two weeks (avg 1.8 tabs/day), 23 tabs second two weeks (avg 1.6 tabs/day) 48 tablet 0     escitalopram (LEXAPRO) 20 MG tablet Take 1 tablet (20 mg) by mouth daily 90 tablet 3     esomeprazole (NEXIUM) 20 MG capsule Take 1 capsule (20 mg) by mouth every morning (before breakfast) Take 30-60 minutes before eating. 30 capsule 0     methocarbamol (ROBAXIN) 500 MG tablet Take 1 tablet (500 mg) by mouth 3 times daily 90 tablet 2     omega 3 1000 MG CAPS Take 1 g by mouth daily 90 capsule        Medical History: any changes in medical history since they were last seen? no    Imaging:  THORACIC MRI 3/23/2018:       CERVICAL MRI completed on 7/14/19:  IMPRESSION:  Mild degenerative disc changes in the mid cervical spine  as described above. Mild neural foraminal narrowings at C3-C4 and  C5-C6. No spinal canal narrowing at any level.     LUMBAR MRI completed on 7/14/19:  IMPRESSION:  Mild degenerative disc changes at L4-L5 and L5-S1. No  spinal  canal or neural foraminal narrowing at any level. Overall,  there does not appear to be significant change since prior MR.    Right shoulder x-ray completed on 4/2/15:  FINDINGS: No evidence of fracture or dislocation. Normal glenohumeral joint alignment without evidence of degenerative changes. No evidence of significant acromioclavicular joint degenerative joint disease. No acromial or coracoid enthesophyte. Mild undersurface acromial sclerosis. Downslope type three acromion. No blastic or lytic lesions.  IMPRESSION:  Right shoulder type three acromion otherwise within normal limits.     [place] Board of Pharmacy Data Base Reviewed: YES; as expected, no concern for abuse or misuse    ROS:  Constitutional, neuro, ENT, endocrine, pulmonary, cardiac, gastrointestinal, genitourinary, musculoskeletal, integument and psychiatric systems are negative, except as otherwise noted.      GENERAL: Healthy, alert and no distress  EYES: Eyes grossly normal to inspection.  No discharge or erythema, or obvious scleral/conjunctival abnormalities.  RESP: No audible wheeze, cough, or visible cyanosis.  No visible retractions or increased work of breathing.    SKIN: Visible skin clear. No significant rash, abnormal pigmentation or lesions.  NEURO: Cranial nerves grossly intact.  Mentation and speech appropriate for age.  PSYCH: Mentation appears normal, affect normal/bright, judgement and insight intact, normal speech and appearance well-groomed.      Assessment:   1. Chronic pain syndrome - widespread myofascial pain  2. Opioid dependence - uncomplicated  3. Chronic central low back pain.    4. Resolved chronic radiating leg pain - S/P L5-S1 right forminotomy Dr. Rios May 19th, 2020.  S/P Right L4-L5 hemilaminectomy partial facetectomy in May 2015 with Dr. Rios.    5. Chronic neck pain. Cervical facet arthropathy.  6. Chronic headaches. Likely related to facet arthropathy, psychogenic, and rebound (excedrin)  7. Anxiety  with panic attacks. Takes lexapro and clonazepam. Benzodiazepine dependence.   8. Tobacco use disorder - currently wearing a patch and working on quiting   9. Chronic bilateral knee pain. Left knee meniscus repair on 11/28/16 with Dr. Kaufman.  Right knee scope 9/20/2018   10. Chronic Thoracic pain - Etiology likely myofascial pain. He has a normal MRI      Plan:  1. Pain Psychology - Recommended, however, the patient went to initial visit and no follow up.  If he continues to struggle to cope with his pain I will recommend that he start to see Marilynn here at the  location.  This should be easier for him to get to with his busy work schedule. He will start to see a psychiatrist this month  2. PT - YES - recently finished a course of PT at [facility]  3. IMAGING-  Reviewed CERVICAL, THORACIC and LUMBAR MRI's. Had updated MRI C Spine out of this system on 9/29/20  4. INJECTION- ordered a C7-T1 SHABANA - patient will schedule this.   4. Medication Management:    1. repeat UDS TOX 13 at every OFFICE visit   2. Suboxone 8mg films -continue  BID.     3. I had agreed in the past to give him traditional opioids for pain flares #2-3 days worth only to be taken IN ADDITION to his Suboxone.  Agreed that this should only happen a few times a year.  I am no longer willing to do this (see below) as I am concerned he is abusing this.       Follow up with Dr. Vallecillo - 3 months  He will call to schedule.  Can be seen IN PERSON OR VIRTUAL for next follow up.     The patient was seen by and staffed with Dr. Vallecillo who agrees with the above assessment and plan    Hina Fernando MD  Pain Fellow      MANJU VALLECILLO MD   Pain Management & Addiction Medicine

## 2020-10-01 ENCOUNTER — OFFICE VISIT (OUTPATIENT)
Dept: PALLIATIVE MEDICINE | Facility: CLINIC | Age: 45
End: 2020-10-01
Payer: COMMERCIAL

## 2020-10-01 VITALS — HEART RATE: 81 BPM | OXYGEN SATURATION: 96 % | SYSTOLIC BLOOD PRESSURE: 120 MMHG | DIASTOLIC BLOOD PRESSURE: 81 MMHG

## 2020-10-01 DIAGNOSIS — Z79.899 ENCOUNTER FOR LONG-TERM (CURRENT) USE OF HIGH-RISK MEDICATION: ICD-10-CM

## 2020-10-01 DIAGNOSIS — F11.20 UNCOMPLICATED OPIOID DEPENDENCE (H): Primary | ICD-10-CM

## 2020-10-01 DIAGNOSIS — M54.12 CERVICAL RADICULOPATHY: ICD-10-CM

## 2020-10-01 DIAGNOSIS — F11.20 UNCOMPLICATED OPIOID DEPENDENCE (H): ICD-10-CM

## 2020-10-01 DIAGNOSIS — F41.1 GAD (GENERALIZED ANXIETY DISORDER): ICD-10-CM

## 2020-10-01 DIAGNOSIS — M79.18 MYOFASCIAL PAIN: ICD-10-CM

## 2020-10-01 DIAGNOSIS — G89.4 CHRONIC PAIN SYNDROME: ICD-10-CM

## 2020-10-01 PROCEDURE — 99214 OFFICE O/P EST MOD 30 MIN: CPT | Performed by: ANESTHESIOLOGY

## 2020-10-01 PROCEDURE — 80306 DRUG TEST PRSMV INSTRMNT: CPT | Performed by: ANESTHESIOLOGY

## 2020-10-01 RX ORDER — BUPRENORPHINE AND NALOXONE 8; 2 MG/1; MG/1
1 FILM, SOLUBLE BUCCAL; SUBLINGUAL 2 TIMES DAILY
Qty: 60 FILM | Refills: 2 | Status: SHIPPED | OUTPATIENT
Start: 2020-10-01 | End: 2020-12-31

## 2020-10-16 NOTE — PROGRESS NOTES
[facility] - Procedure Note    Date of Visit: 10/21/2020    Procedure performed: C7-T1 interlaminar epidural steroid injection with fluoroscopic guidance  Diagnosis: Cervical spondylosis; Cervical radiculitis/radiculopathy  : Reba Vallecillo MD & Hina Fernando MD (pain fellow)   Anesthesia: none    Indications: Diego Meng is a 45 year old male who is seen at the request of myself for cervical epidural steroid injection. The patient describes neck pain and L > R shoulder/arm pain. The patient has been exhibiting symptoms consistent with cervical intraspinal inflammation and radiculopathy. Symptoms have been persistent, disabling, and intermittently severe. The patient reports minimal improvement with conservative treatment, including medications and physical therapy and prior injections.    Cervical MRI was done on 7/13/2019 which showed   FINDINGS: Normal cervical lordosis. Anterior posterior alignment of  the spine is within normal limits. Vertebral body height is maintained  without evidence of fracture. There are no destructive osseous  lesions. Mild loss of intervertebral disc height with disc desiccation  and degenerative endplate changes at C3-C4, C4-C5, and C5-C6. No STIR  hyperintense endplate edema.      No abnormal signal appreciated within the visualized spinal cord.     Level by level as follows:      C2-C3: No significant disc herniation. No spinal canal or neural  foraminal narrowing.      C3-C4: Mild posterior disc bulge with bilateral uncinate spurring  right greater than left. Mild bilateral facet hypertrophy. No spinal  canal narrowing. Mild right neural foraminal narrowing. Mild left  neural foraminal narrowing.     C4-C5: Trace posterior disc bulge. No spinal canal or neural foraminal  narrowing.      C5-C6: Mild posterior disc bulge and uncinate spurring. No spinal  canal narrowing. Mild bilateral neural foraminal narrowing.      C6-C7: No  significant disc herniation. No spinal canal or neural  foraminal narrowing.      C7-T1: No significant disc herniation. No spinal canal or neural  foraminal narrowing.      Paraspinous soft tissues are unremarkable.                                                                    IMPRESSION:  Mild degenerative disc changes in the mid cervical spine  as described above. Mild neural foraminal narrowings at C3-C4 and  C5-C6. No spinal canal narrowing at any level.     Allergies:      Allergies   Allergen Reactions     Nsaids Rash     Tolmetin Rash        Vitals:  /83 (BP Location: Left arm, Cuff Size: Adult Regular)   Pulse 74   SpO2 97%     Review of Systems: The patient denies recent fever, chills, illness, use of antibiotics or anticoagulants. All other 10-point review of systems negative.     Procedure: The procedure and risks were explained, and informed written consent was obtained from the patient. Risks include but are not limited to: infection, bleeding, increased pain, and damage to soft tissue, nerve, muscle, and vasculature structures. After getting informed consent, patient was brought into the procedure suite and was placed in a prone position on the procedure table. A Pause for the Cause was performed. Patient was prepped and draped in sterile fashion.     The C7-T1 interspace was identified with use of fluoroscopy in AP view. A 25-gauge, 1.5 inch needle was used to anesthetize the skin and subcutaneous tissue entry site with a total of 2 ml of 1% lidocaine. Under fluoroscopic visualization, a 22-gauge, 3.5 inch Tuohy epidural needle was slowly advanced towards the epidural space a few millimeters left of midline. The latter part of the needle advancement was guided with fluoroscopy in the lateral view. The epidural space was identified using loss of resistance technique. After negative aspiration for heme and cerebrospinal fluid, a total of 1 mL of non-ionic contrast was injected to confirm  needle placement. 9 mL of contrast was wasted. Epidurogram confirmed spread within the posterior epidural space. 2 ml of 10mg/ml of dexamethasone and 1 ml of preservative free 1% lidocaine was injected. The needle was removed.  Images were saved to PACS.    The patient tolerated the procedure well, and there was no evidence of procedural complications. No new sensory or motor deficits were noted following the procedure. The patient was stable and able to ambulate on discharge home. Post-procedure instructions were provided.     Pre-procedure pain score: 6/10 in the neck, 6/10 in the arm  Post-procedure pain score: 7/10 in the neck, 7/10 in the arm    Assessment/Plan: Diego Meng is a 45 year old male s/p cervical interlaminar epidural steroid injection today for cervical spondylosis and radiculitis/radiculopathy.     1. Following today's procedure, the patient was advised to contact the Pain Management Center for any of the following:   Fever, chills, or night sweats   New onset of pain, numbness, or weakness   Any questions/concerns regarding the procedure  If unable to contact the Pain Center, the patient was instructed to go to a local Emergency Room for any complications.   2. The patient will receive a follow-up call in 1 week.   3. Follow-up with the referring provider in 2 weeks for post-procedure evaluation.    MANJU KEITH MD   Pain Management & Addiction Medicine

## 2020-10-20 ENCOUNTER — TELEPHONE (OUTPATIENT)
Dept: PALLIATIVE MEDICINE | Facility: CLINIC | Age: 45
End: 2020-10-20

## 2020-10-21 ENCOUNTER — RADIOLOGY INJECTION OFFICE VISIT (OUTPATIENT)
Dept: PALLIATIVE MEDICINE | Facility: CLINIC | Age: 45
End: 2020-10-21
Attending: ANESTHESIOLOGY
Payer: COMMERCIAL

## 2020-10-21 ENCOUNTER — ANCILLARY PROCEDURE (OUTPATIENT)
Dept: GENERAL RADIOLOGY | Facility: CLINIC | Age: 45
End: 2020-10-21
Attending: ANESTHESIOLOGY
Payer: COMMERCIAL

## 2020-10-21 VITALS — HEART RATE: 74 BPM | OXYGEN SATURATION: 97 % | DIASTOLIC BLOOD PRESSURE: 83 MMHG | SYSTOLIC BLOOD PRESSURE: 126 MMHG

## 2020-10-21 DIAGNOSIS — M54.12 CERVICAL RADICULOPATHY: ICD-10-CM

## 2020-10-21 DIAGNOSIS — M54.12 CERVICAL RADICULOPATHY: Primary | ICD-10-CM

## 2020-10-21 PROCEDURE — 62321 NJX INTERLAMINAR CRV/THRC: CPT | Performed by: ANESTHESIOLOGY

## 2020-10-21 NOTE — NURSING NOTE
Discharge Information    IV Discontiued Time:  NA    Amount of Fluid Infused:  NA    Discharge Criteria = When patient returns to baseline or as per MD order    Consciousness:  Pt is fully awake    Circulation:  BP +/- 20% of pre-procedure level    Respiration:  Patient is able to breathe deeply    O2 Sat:  Patient is able to maintain O2 Sat >92% on room air    Activity:  Moves 4 extremities on command    Ambulation:  Patient is able to stand and walk or stand and pivot into wheelchair    Dressing:  Clean/dry or No Dressing    Notes:   Discharge instructions and AVS given to patient    Patient meets criteria for discharge?  YES    Admitted to PCU?  No    Responsible adult present to accompany patient home?  Yes    Signature/Title:    Regina Birch RN Care Coordinator  [facility] [place]

## 2020-11-02 ENCOUNTER — TELEPHONE (OUTPATIENT)
Dept: PALLIATIVE MEDICINE | Facility: CLINIC | Age: 45
End: 2020-11-02

## 2020-11-02 DIAGNOSIS — M54.12 CERVICAL RADICULOPATHY: Primary | ICD-10-CM

## 2020-11-02 NOTE — TELEPHONE ENCOUNTER
Pt has SHABANA done 10/21 stated he is having a lot of pain from that area. Pt requesting to speak with a triage nurse.      Ramana REID    [facility] [place]

## 2020-11-03 NOTE — TELEPHONE ENCOUNTER
Called pt. He states that initially after injection the pain was manageable. Reports that since Saturday he has been in a lots of pain, pain from his neck that radiates to left shoulder and left elbow. He has been taking suboxone as directed, methocarbamol three timed a day, tylenol and ibuprofen alternating to max dose. He is icing it which is helping and laying down has been somewhat helpful. Site is WNL. No new areas of pain from prior to injection. Reports that he can't tell if pain is coming from his neck or shoulder. States that putting arm up in the arm is uncomfortable and has been dangling his arm for some relief. He is wondering what other options/recommendations there may be.     Routing for review/recommendation  Ok to leave detailed messages per pt.      10/21/20-C7-T1 interlaminar epidural steroid injection  Indications: Diego Meng is a 45 year old male who is seen at the request of myself for cervical epidural steroid injection. The patient describes neck pain and L > R shoulder/arm pain. The patient has been exhibiting symptoms consistent with cervical intraspinal inflammation and radiculopathy. Symptoms have been persistent, disabling, and intermittently severe. The patient reports minimal improvement with conservative treatment, including medications and physical therapy and prior injections.      10/01/20-  Plan:  1. Pain Psychology - Recommended, however, the patient went to initial visit and no follow up.  If he continues to struggle to cope with his pain I will recommend that he start to see Marilynn here at the  location.  This should be easier for him to get to with his busy work schedule. He will start to see a psychiatrist this month  2. PT - YES - recently finished a course of PT at [facility]  3. IMAGING-  Reviewed CERVICAL, THORACIC and LUMBAR MRI's. Had updated MRI C Spine out of this system on 9/29/20  4. INJECTION- ordered a C7-T1 SHABANA - patient will schedule this.   4.  Medication Management:                1. repeat UDS TOX 13 at every OFFICE visit               2. Suboxone 8mg films -continue  BID.                 3. I had agreed in the past to give him traditional opioids for pain flares #2-3 days worth only to be taken IN ADDITION to his Suboxone.  Agreed that this should only happen a few times a year.  I am no longer willing to do this (see below) as I am concerned he is abusing this.         Follow up with Dr. Keith - 3 months  He will call to schedule.  Can be seen IN PERSON OR VIRTUAL for next follow up.      The patient was seen by and staffed with Dr. Keith who agrees with the above assessment and plan     Hina Fernando MD  Pain Fellow        MANJU KEITH MD   Pain Management & Addiction Medicine

## 2020-11-03 NOTE — TELEPHONE ENCOUNTER
Called Diego.  Informed him Dr Vallecillo does not think he is having a complication from the injection.  This is most likely a pain flare. Based on the MRI done from 09/29/2020 it was recommended he follow up with the spine team.  Dr Vallecillo is recommending he follow up with the spine team as well.  She is also recommending PT. He states he would like to try PT.  We will ask Dr Vallecillo for those orders.  Pt will contact the spine team to discuss what is going on.    Routing to provider for PT orders.    Regina Birch RN  Care Coordinator  [facility]

## 2020-11-03 NOTE — TELEPHONE ENCOUNTER
Patient called wondering when he will be able to speak to a nurse          Karmen Lopez    [facility]

## 2020-11-03 NOTE — TELEPHONE ENCOUNTER
"As he is over a week out from his injection this is not a complication of the injection.  Probably a pain flare of his normal neck and radiating arm pain. He saw his PCP for this and had an MRI done on 9/29/2020 through [facility].  Based on that MRI which showed some narrowing at C5-6 and c6-7 on the left It was recommended that he \"follow up with his spine team\".  I am going to recommend that he do this as well as he has not failed most conservative measures.  I also would continue to recommend formal physical therapy which he has not done.     Reba Vallecillo MD       "

## 2020-12-27 ENCOUNTER — HEALTH MAINTENANCE LETTER (OUTPATIENT)
Age: 45
End: 2020-12-27

## 2020-12-31 DIAGNOSIS — F11.20 UNCOMPLICATED OPIOID DEPENDENCE (H): ICD-10-CM

## 2020-12-31 DIAGNOSIS — G89.4 CHRONIC PAIN SYNDROME: ICD-10-CM

## 2020-12-31 RX ORDER — BUPRENORPHINE AND NALOXONE 8; 2 MG/1; MG/1
1 FILM, SOLUBLE BUCCAL; SUBLINGUAL 2 TIMES DAILY
Qty: 60 FILM | Refills: 2 | Status: SHIPPED | OUTPATIENT
Start: 2020-12-31 | End: 2020-12-31

## 2020-12-31 RX ORDER — BUPRENORPHINE AND NALOXONE 8; 2 MG/1; MG/1
1 FILM, SOLUBLE BUCCAL; SUBLINGUAL 2 TIMES DAILY
Qty: 60 FILM | Refills: 0 | Status: SHIPPED | OUTPATIENT
Start: 2020-12-31 | End: 2021-01-06

## 2020-12-31 NOTE — TELEPHONE ENCOUNTER
Reason for call:  Medication   If this is a refill request, has the caller requested the refill from the pharmacy already? No  Will the patient be using a [facility]? No  Name of the pharmacy and phone number for the current request: [id number] [address] [place] [address]    Name of the medication requested: buprenorphine HCl-naloxone HCl (SUBOXONE) 8-2 MG per film    Other request: call when ready    Phone number to reach patient:  Home number on file [phone] (home)    Can we leave a detailed message on this number?  YES    Travel screening: Not Applicable           Ramana REID    [facility] [place]

## 2020-12-31 NOTE — TELEPHONE ENCOUNTER
Medication refill information reviewed.     Due date for suboxone is now.     At the visit on 10/1, timing of follow-up noted:    Follow up with Dr. Vallecillo - 3 months  He will call to schedule.  Can be seen IN PERSON OR VIRTUAL for next follow up.     Pt is due for an appointment next week. Will ask MA to remind pt to schedule when informing about refill     Prescriptions prepped for review.     Will route to provider.     COLLEEN Moody, RN-BC  Patient Care Supervisor  [facility] [place]

## 2020-12-31 NOTE — TELEPHONE ENCOUNTER
Spoke to patient . Rx was E-Prepcribed to:     [id number] - [facility] [address] AT [facility] [address]     Orders Placed This Encounter   Medications     DISCONTD: buprenorphine HCl-naloxone HCl (SUBOXONE) 8-2 MG per film     Sig: Place 1 Film under the tongue 2 times daily XDEA = [id number] BRAND NAME     Dispense:  60 Film     Refill:  2     buprenorphine HCl-naloxone HCl (SUBOXONE) 8-2 MG per film     Sig: Place 1 Film under the tongue 2 times daily XDEA = [id number] BRAND NAME     Dispense:  60 Film     Refill:  0     NADEAN: [id number]     Follow up appointment scheduled.    Patient notified of dispense and start date.

## 2021-01-05 NOTE — PROGRESS NOTES
"Diego Meng is a 45 year old male who is being evaluated via a billable video visit.      How would you like to obtain your AVS? MyChart  If the video visit is dropped, the invitation should be resent by: Text to cell phone: .  Will anyone else be joining your video visit? No     Joana Gutierrez CMA   [facility]   [facility] Management Center    Video Start Time: 1:29 PM      Video-Visit Details    Type of service:  Video Visit    Video End Time:1:42 PM    Originating Location (pt. Location): Home    Distant Location (provider location):  [facility]   Platform used for Video Visit: Well                            [facility] [place]    Date of visit: 1/06/2021    Chief complaint:   Chief Complaint   Patient presents with     Pain     Diego Meng is a 45 year old male last seen by me for VIRTUAL follow up on 10/1/2020.      Brief history: 45 YEAR OLD MALE who began having low back pain at age 20.  Pain continued and progressed to neck and right shoulder pain for the last 20 years.  History of high dose opioids after having a L4-5 decompression at [facility] by Dr. Rios. Dose escalated and got \"out of control\".  He admits to overuse.  He was transitioned to buprenorphine by Dr. Almonte and was transferred to myself for ongoing care. PMHx is significant for anxiety.     Since his last visit, Diego Meng reports:  - Doing well  - He is scheduled for a artificial disc replacement (C5-6) with Dr. Rios on 1/12/2021  - S/P C7-T1 SHABANA 10/21/2020 with worsening neck pain and right and left arm pain.   - Diagnostic C6 left nerve root injection @ [facility] on 12/8/2020 which resulted in 100% short term relief of his symptoms.   - S/P RIGHT L5-S1 foraminotomy May 19th, 2020 with Dr. Rios at [facility]. He feels like they did a great job with his anesthesia and post operative pain so he is planning to go back there for this next " surgery.  He remembers that they used ketamine which was super effective for his pain.   - He previously stayed on his suboxone 8mg BID jeanine-operatively and this worked well for his pain.   - Feels like the low back pain he had prior to surgery (back-buttock-leg on the right side) is gone since the surgery    - Taking Robaxin twice per day since neck pain has flared PRN  - EFFEXOR 37.5mg daily was added by his PCP at .  She also increased his Klonopin to #4 tabs on weekdays and #2 tabs on weekends.  He has not been able to taper off this despite trials at decreasing his dose. He knows that it makes him feel tired and sedated. He is currently using 3 klonopin per day. No plans for titration on the Effexor, does think it helps  - Will be seeing a psychiatrist on Monday to help with medication management.  Dr. Karl Churchill.    - Working long hours as an  in a firm [place] that he [place]. Working from home 3 days a week and going into the office 2 days/week since covid-19 outbreak.   - Did 4 sessions of PT at [facility] and it was not helpful for his low back pain. Helpful for his thoracic back pain which is not longer painful for him.  Therapist Jose was fantastic.     - Not really participating in multidisciplinary care.  He saw Teresa Toledo - pain psychology on 7/11/2018.  Did not follow up        Pain scores:  Pain intensity on average is 6 (8 last visit) on a scale of 0-10.     Current pain treatments:   Suboxone 8-2 mg #2/day (BRAND NAME ONLY)  Excedrin #2-6/day for 20 years  lexapro 20 mg daily  Clonazepam 0.5mg #3/day  Robaxin 500-1000mg PRN QID  EFFEXOR 37.5MG PO DAILY    Labs: CRP <2.9, RF <20, ESR 6    Side Effects: no side effects from suboxone    Past pain treatments:  Diego Meng has been seen at a pain clinic in the past. [facility] Dr. Vázquez and [facility]  Medications:     Medrol dose carolin -no relief              botox -no relief              Anti-migraine:  fioricet, migranal, eletriptan, frovatriptan, midrin, naraptriptan, sumatriptan, zolmitripatn -no relief from any of these              Anti-convulsants: depakote, topiramate, gabapentin, lyrica -all of these made him feel fuzzy and forgetful and they were not helpful              Opioids: oxycontin, morphine -no relief, hydrocodone -no relief, tramadol -no relief                Muscle relaxants: soma -no relief, flexeril -no relief and made him tired, norflex -no relief, tizanidine -no relief and made him tired              NSAIDs: allergic to this class of medications -rash, abdominal pain and cramping, toradol -upset stomach              Anti-depressants: cymbalta -no relief                Topicals: lidocaine -no relief  PT: [facility] in 2016 -helped for shoulder but flared up back and neck  Psychology: yes for pain at the [facility] -'continues to use these skills'  Acupuncture: tried it -no relief  Chiropractic care: tried it -helped initially but then flared up pain  TENS Unit: no relief    Injections:   SHABANA 10/21/2020 - not helpful for his neck pain  Diagnostic C6 nerve root block CDI 12/8/2020  R L4 and L5 TF BAY 12/24/16 -flared pain  cervical epidural steroid injection -provided 2-4 weeks of relief but had significant flare up for 2 weeks post-procedure  cervical trigger point injections -minimal relief  lumbar medial branch block -no relief  lumbar paraspinal trigger point injection 5/17/17 - not helpful  Thoracic TRIGGER POINT INJECTION 7/27/2018.     Surgical history:    Right L5-S1 foraminotomy Dr. Rios May 19th, 2020   right L4-L5 hemilaminectomy May 2015 Dr. Rios  Right knee scope 9/25/2018    Medications:  Current Outpatient Medications   Medication Sig Dispense Refill     atorvastatin (LIPITOR) 40 MG tablet Take 1 tablet (40 mg) by mouth daily 30 tablet 0     buprenorphine HCl-naloxone HCl (SUBOXONE) 8-2 MG per film Place 1 Film under the tongue 2  times daily XDEA = [id number] BRAND NAME 60 Film 0     clonazePAM (KLONOPIN) 1 MG tablet 25 tabs first two weeks (avg 1.8 tabs/day), 23 tabs second two weeks (avg 1.6 tabs/day) 48 tablet 0     escitalopram (LEXAPRO) 20 MG tablet Take 1 tablet (20 mg) by mouth daily 90 tablet 3     esomeprazole (NEXIUM) 20 MG capsule Take 1 capsule (20 mg) by mouth every morning (before breakfast) Take 30-60 minutes before eating. 30 capsule 0     methocarbamol (ROBAXIN) 500 MG tablet Take 1 tablet (500 mg) by mouth 3 times daily 90 tablet 2     omega 3 1000 MG CAPS Take 1 g by mouth daily 90 capsule        Medical History: any changes in medical history since they were last seen? no    Imaging:  THORACIC MRI 3/23/2018:       CERVICAL MRI completed on 7/14/19:  IMPRESSION:  Mild degenerative disc changes in the mid cervical spine  as described above. Mild neural foraminal narrowings at C3-C4 and  C5-C6. No spinal canal narrowing at any level.     LUMBAR MRI completed on 7/14/19:  IMPRESSION:  Mild degenerative disc changes at L4-L5 and L5-S1. No  spinal canal or neural foraminal narrowing at any level. Overall,  there does not appear to be significant change since prior MR.    Right shoulder x-ray completed on 4/2/15:  FINDINGS: No evidence of fracture or dislocation. Normal glenohumeral joint alignment without evidence of degenerative changes. No evidence of significant acromioclavicular joint degenerative joint disease. No acromial or coracoid enthesophyte. Mild undersurface acromial sclerosis. Downslope type three acromion. No blastic or lytic lesions.  IMPRESSION:  Right shoulder type three acromion otherwise within normal limits.     [place] Board of Pharmacy Data Base Reviewed: YES; as expected, no concern for abuse or misuse    ASSESSMENT/PLAN:     1. Uncomplicated opioid dependence (H)  Counseled the patient on the importance of having a recovery program in addition to Suboxone maintenance.  Also discussed having a sober  support network, not isolating, being open, honest, and willing to change, avoiding triggers and managing cravings. In addition, @HE@ should abstain from alcohol, benzodiazepines, THC, opioids and other drugs of abuse. Risk of overdose following a period of abstinence due to decrease tolerance was discussed including risk of death. Risk of overdose if using Suboxone with other substances particuarly benzodiazepines/alcohol was reviewed.  - buprenorphine HCl-naloxone HCl (SUBOXONE) 8-2 MG per film; Place 1 Film under the tongue 2 times daily XDEA = [id number] BRAND NAME  Dispense: 60 Film; Refill: 2    2. Cervical radiculopathy  C5-6 artificial disk replacement planned for 1/12/2021 with Dr. Wood at .      The jeanine-operatiove plan is to CONTINUE his suboxone and his current dose of 8mg BID.  Recommend regional anesthesia and intraoperative ketamine gtt.  Dr. Wood will need to manage his post operative pain.  Would recommend using twice the normal amount of post operative opioids for post op pain for twice as long as it is normally used.  If he has trouble I would be happy to prescribe for him or discuss the plan over the phone with dr. wood.      3. Chronic pain syndrome  Chronic low back pain with radicular symptoms S/P L5-S1 right forminotomy Dr. Wood May 19th, 2020.  S/P Right L4-L5 hemilaminectomy partial facetectomy in May 2015 with Dr. Wood.     Chronic bilateral knee pain. Left knee meniscus repair on 11/28/16 with Dr. Kaufman.  Right knee scope 9/20/2018     Chronic headaches. Likely related to facet arthropathy, psychogenic, and rebound (excedrin)    Pain Psychology - Recommended, however, the patient went to initial visit and no follow up.  If he continues to struggle to cope with his pain I will recommend that he start to see Marilynn here at the  location.    Pain PT - recently finished a course of PT at [facility]    Reviewed CERVICAL, THORACIC and LUMBAR MRI's.  Had updated MRI C Spine out of this system on 9/29/20      - buprenorphine HCl-naloxone HCl (SUBOXONE) 8-2 MG per film; Place 1 Film under the tongue 2 times daily XDEA = [id number] BRAND NAME  Dispense: 60 Film; Refill: 2    4. Myofascial pain  Chronic thoracic back pain with normal MRI - likely etiology myofascial pain    5. BRIDGER (generalized anxiety disorder)  On Klonopin prescribed by his PCP ([facility]).  He has been unable to wean off this despite efforts to do so.     6. Encounter for long-term (current) use of high-risk medication  High Risk Drug Monitoring?  YES  Drug being monitored: Suboxone   Reason for drug: Opioid Use Disorder  What is being monitored?: Dosage, Cravings, Trigger, side effects, and abstinence.    UDS Tox 13 last obtained on 10/1/2020 and was as expected.       MEDICATIONS:   Orders Placed This Encounter   Medications     buprenorphine HCl-naloxone HCl (SUBOXONE) 8-2 MG per film     Sig: Place 1 Film under the tongue 2 times daily XDEA = [id number] BRAND NAME     Dispense:  60 Film     Refill:  2     NADEAN: [id number]          - Continue other medications without change    FUTURE APPOINTMENTS:       - Follow-up visit in 3 months - he will call to schedule        BILLING TIME DOCUMENTATION:   The total TIME spent on this patient on the date of the encounter/appointment was 38 minutes.      TOTAL TIME includes:   Time spent preparing to see the patient (reviewing records and tests) - 4 min  Time spent face to face (or over the phone) with the patient - 13 min  Time spent ordering tests, medications, procedures and referrals - 0 min  Time spent Referring and communicating with other healthcare professionals - 0 min  Time spent documenting clinical information in Epic - 21 min      MANJU KEITH MD   Pain Management & Addiction Medicine

## 2021-01-06 ENCOUNTER — VIRTUAL VISIT (OUTPATIENT)
Dept: PALLIATIVE MEDICINE | Facility: CLINIC | Age: 46
End: 2021-01-06
Payer: COMMERCIAL

## 2021-01-06 DIAGNOSIS — M79.18 MYOFASCIAL PAIN: ICD-10-CM

## 2021-01-06 DIAGNOSIS — M54.12 CERVICAL RADICULOPATHY: ICD-10-CM

## 2021-01-06 DIAGNOSIS — F11.20 UNCOMPLICATED OPIOID DEPENDENCE (H): Primary | ICD-10-CM

## 2021-01-06 DIAGNOSIS — Z79.899 ENCOUNTER FOR LONG-TERM (CURRENT) USE OF HIGH-RISK MEDICATION: ICD-10-CM

## 2021-01-06 DIAGNOSIS — F41.1 GAD (GENERALIZED ANXIETY DISORDER): ICD-10-CM

## 2021-01-06 DIAGNOSIS — G89.4 CHRONIC PAIN SYNDROME: ICD-10-CM

## 2021-01-06 PROCEDURE — 99214 OFFICE O/P EST MOD 30 MIN: CPT | Mod: 95 | Performed by: ANESTHESIOLOGY

## 2021-01-06 RX ORDER — BUPRENORPHINE AND NALOXONE 8; 2 MG/1; MG/1
1 FILM, SOLUBLE BUCCAL; SUBLINGUAL 2 TIMES DAILY
Qty: 60 FILM | Refills: 2 | Status: SHIPPED | OUTPATIENT
Start: 2021-01-06 | End: 2021-04-14

## 2021-01-06 ASSESSMENT — PAIN SCALES - GENERAL: PAINLEVEL: SEVERE PAIN (7)

## 2021-01-26 NOTE — ADDENDUM NOTE
Addended by: TATIANA GARCÍA on: 8/17/2017 10:28 AM     Modules accepted: Orders     (2) Complete hemianopia

## 2021-03-30 DIAGNOSIS — M79.18 MYOFASCIAL PAIN: ICD-10-CM

## 2021-03-30 NOTE — TELEPHONE ENCOUNTER
Received fax request from:    [facility] #[id number] - [place], [place] - [address] [address]  [address] [address]  Phone: [phone] Fax: [phone]    Pharmacy requesting refill(s) for:    methocarbamol (ROBAXIN) 500 MG tablet    Last refilled on 11/28/20    Pt last seen on 01/06/21    Next appt scheduled for 04/07/21    Will facilitate refill.    Marcia Cohen [facility]

## 2021-03-31 RX ORDER — METHOCARBAMOL 500 MG/1
500 TABLET, FILM COATED ORAL 3 TIMES DAILY
Qty: 90 TABLET | Refills: 2 | Status: SHIPPED | OUTPATIENT
Start: 2021-03-31 | End: 2021-04-14

## 2021-03-31 NOTE — TELEPHONE ENCOUNTER
Left message on id vm to inform of approved RX .  Katerina/MA  [facility]

## 2021-04-08 ENCOUNTER — OFFICE VISIT (OUTPATIENT)
Dept: ORTHOPEDICS | Facility: CLINIC | Age: 46
End: 2021-04-08
Payer: COMMERCIAL

## 2021-04-08 VITALS
WEIGHT: 238 LBS | DIASTOLIC BLOOD PRESSURE: 78 MMHG | SYSTOLIC BLOOD PRESSURE: 118 MMHG | BODY MASS INDEX: 33.32 KG/M2 | HEIGHT: 71 IN

## 2021-04-08 DIAGNOSIS — M17.12 PRIMARY OSTEOARTHRITIS OF LEFT KNEE: Primary | ICD-10-CM

## 2021-04-08 DIAGNOSIS — M17.11 PRIMARY OSTEOARTHRITIS OF RIGHT KNEE: ICD-10-CM

## 2021-04-08 PROCEDURE — 99213 OFFICE O/P EST LOW 20 MIN: CPT | Performed by: ORTHOPAEDIC SURGERY

## 2021-04-08 ASSESSMENT — MIFFLIN-ST. JEOR: SCORE: 1988.75

## 2021-04-08 NOTE — PATIENT INSTRUCTIONS
Prior Authorization has been submitted for Synvisc One injection.   Recommend scheduling 2 weeks after last dose of Covid-19 Vaccine.   Can schedule this appointment by calling [phone].

## 2021-04-08 NOTE — PROGRESS NOTES
HISTORY OF PRESENT ILLNESS:    Diego Meng is a 45 year old male who is seen in follow up for bilateral knee pain. Patient was previously evaluated for his left knee on 7/22/20. History of  right knee pain, patient had right knee arthroscopy DOS 9/25/18, and left knee arthroscopy with medial meniscectomy, DOS: 11/28/16.  Patient reports chronic knee pain, with worsening pain over the last 2 weeks after recent hiking trip.  Patient reports Covid-19 vaccine 4/2/21, 1 week out, has second dose scheduled on 4/22/21. Patient currently working as .   Present symptoms: bilateral knee pain.  Pain is constant and along the medial and lateral joint lines. Pain increases with walking, weightbearing. He states he is having difficulties tolerating shopping trips. Pain improved with seated rest. Patient reports difficulties falling asleep at nighttime if aggravated severely throughout the day. Patient denies catching and locking in the knees.   Current pain level: 7/10, Worst pain level: 9/10   Treatments tried to this point: rest, currently in pain program: subaxone   Past Medical History: Patient reports recent surgery to cervical spine 1/12/21. Unchanged from the visit of 7/22/20. Please refer to that note.    REVIEW OF SYSTEMS:  CONSTITUTIONAL:  NEGATIVE for fever, chills, change in weight  INTEGUMENTARY/SKIN:  NEGATIVE for worrisome rashes, moles or lesions  EYES:  NEGATIVE for vision changes or irritation  ENT/MOUTH:  NEGATIVE for ear, mouth and throat problems  RESP:  NEGATIVE for significant cough or SOB  BREAST:  NEGATIVE for masses, tenderness or discharge  CV:  NEGATIVE for chest pain, palpitations or peripheral edema  GI: reflux, heartburn  :  Negative   MUSCULOSKELETAL:  See HPI above  NEURO:  NEGATIVE for weakness, dizziness or paresthesias  ENDOCRINE:  NEGATIVE for temperature intolerance, skin/hair changes  HEME/ALLERGY/IMMUNE:  NEGATIVE for bleeding problems  PSYCHIATRIC:  Panic  "attacks      PHYSICAL EXAM:  /78 (BP Location: Right arm, Patient Position: Chair, Cuff Size: Adult Regular)   Ht 1.791 m (5' 10.5\")   Wt 108 kg (238 lb)   BMI 33.67 kg/m    Body mass index is 33.67 kg/m .   GENERAL APPEARANCE: healthy, alert and no distress   SKIN: no suspicious lesions or rashes  NEURO: Normal strength and tone, mentation intact and speech normal  VASCULAR:  good pulses, and cappillary refill   LYMPH: no lymphadenopathy   PSYCH:  mentation appears normal and affect normal/bright    MSK:  Not in acute distress  No difficulty getting up sitting  Walking with a limp  Full range of motion, bilateral  Moderate patellofemoral crepitus, right  Minimal patellofemoral crepitus, left  Tenderness with patellofemoral compression and translation, right knee  Ligaments are stable throughout the knee  Negative Kamar's  Motor function is full  Calf is not swollen  Circulation is intact  Sensation is intact  Skin is intact    IMAGING INTERPRETATION:      X-ray images of the left knee were reviewed from 7/22/20.      ASSESSMENT:  Bilateral patellofemoral DJD, right worse than left    PLAN:  We talked about the nature of arthritis particularly at the patellofemoral joint.  We talked about potential benefit of weight loss and doing nonimpact gliding activities.  Because of ongoing pain that is not satisfactory to him, he wants to pursue additional intervention.  Because of the concern for cortisone affecting healthy cartilage, we recommended consideration of viscoelastic injection.  He is going through Covid vaccine at this time and we will wait till he is completed with the vaccination.    In the meantime, preauthorization will be requested for Synvisc 1.  We will see him back about 2 weeks after he is done with Covid vaccination.  All the questions were answered.           Serafin Kramer MD  Dept. Orthopedic Surgery  [facility]       Disclaimer: This note consists of symbols derived from " keyboarding, dictation and/or voice recognition software. As a result, there may be errors in the script that have gone undetected. Please consider this when interpreting information found in this chart.

## 2021-04-08 NOTE — LETTER
4/8/2021         RE: Diego Meng  [address]  [facility] [address]        Dear Colleague,    Thank you for referring your patient, Diego Meng, to the [facility] [place]. Please see a copy of my visit note below.    HISTORY OF PRESENT ILLNESS:    Diego Meng is a 45 year old male who is seen in follow up for bilateral knee pain. Patient was previously evaluated for his left knee on 7/22/20. History of  right knee pain, patient had right knee arthroscopy DOS 9/25/18, and left knee arthroscopy with medial meniscectomy, DOS: 11/28/16.  Patient reports chronic knee pain, with worsening pain over the last 2 weeks after recent hiking trip.  Patient reports Covid-19 vaccine 4/2/21, 1 week out, has second dose scheduled on 4/22/21. Patient currently working as .   Present symptoms: bilateral knee pain.  Pain is constant and along the medial and lateral joint lines. Pain increases with walking, weightbearing. He states he is having difficulties tolerating shopping trips. Pain improved with seated rest. Patient reports difficulties falling asleep at nighttime if aggravated severely throughout the day. Patient denies catching and locking in the knees.   Current pain level: 7/10, Worst pain level: 9/10   Treatments tried to this point: rest, currently in pain program: subaxone   Past Medical History: Patient reports recent surgery to cervical spine 1/12/21. Unchanged from the visit of 7/22/20. Please refer to that note.    REVIEW OF SYSTEMS:  CONSTITUTIONAL:  NEGATIVE for fever, chills, change in weight  INTEGUMENTARY/SKIN:  NEGATIVE for worrisome rashes, moles or lesions  EYES:  NEGATIVE for vision changes or irritation  ENT/MOUTH:  NEGATIVE for ear, mouth and throat problems  RESP:  NEGATIVE for significant cough or SOB  BREAST:  NEGATIVE for masses, tenderness or discharge  CV:  NEGATIVE for chest pain, palpitations or peripheral edema  GI:  "reflux, heartburn  :  Negative   MUSCULOSKELETAL:  See HPI above  NEURO:  NEGATIVE for weakness, dizziness or paresthesias  ENDOCRINE:  NEGATIVE for temperature intolerance, skin/hair changes  HEME/ALLERGY/IMMUNE:  NEGATIVE for bleeding problems  PSYCHIATRIC:  Panic attacks      PHYSICAL EXAM:  /78 (BP Location: Right arm, Patient Position: Chair, Cuff Size: Adult Regular)   Ht 1.791 m (5' 10.5\")   Wt 108 kg (238 lb)   BMI 33.67 kg/m    Body mass index is 33.67 kg/m .   GENERAL APPEARANCE: healthy, alert and no distress   SKIN: no suspicious lesions or rashes  NEURO: Normal strength and tone, mentation intact and speech normal  VASCULAR:  good pulses, and cappillary refill   LYMPH: no lymphadenopathy   PSYCH:  mentation appears normal and affect normal/bright    MSK:  Not in acute distress  No difficulty getting up sitting  Walking with a limp  Full range of motion, bilateral  Moderate patellofemoral crepitus, right  Minimal patellofemoral crepitus, left  Tenderness with patellofemoral compression and translation, right knee  Ligaments are stable throughout the knee  Negative Kamar's  Motor function is full  Calf is not swollen  Circulation is intact  Sensation is intact  Skin is intact    IMAGING INTERPRETATION:      X-ray images of the left knee were reviewed from 7/22/20.      ASSESSMENT:  Bilateral patellofemoral DJD, right worse than left    PLAN:  We talked about the nature of arthritis particularly at the patellofemoral joint.  We talked about potential benefit of weight loss and doing nonimpact gliding activities.  Because of ongoing pain that is not satisfactory to him, he wants to pursue additional intervention.  Because of the concern for cortisone affecting healthy cartilage, we recommended consideration of viscoelastic injection.  He is going through Covid vaccine at this time and we will wait till he is completed with the vaccination.    In the meantime, preauthorization will be requested " for Synvisc 1.  We will see him back about 2 weeks after he is done with Covid vaccination.  All the questions were answered.           Serafin Kramer MD  Dept. Orthopedic Surgery  [facility]       Disclaimer: This note consists of symbols derived from keyboarding, dictation and/or voice recognition software. As a result, there may be errors in the script that have gone undetected. Please consider this when interpreting information found in this chart.        Again, thank you for allowing me to participate in the care of your patient.        Sincerely,        Serafin Kramer MD

## 2021-04-13 NOTE — PROGRESS NOTES
"Diego is a 45 year old who is being evaluated via a billable video visit.      How would you like to obtain your AVS? MyChart  If the video visit is dropped, the invitation should be resent by: Text to cell phone: [phone]  Will anyone else be joining your video visit? No       [facility]  [facility] Management Center    Video Start Time: 1:38 PM  Video-Visit Details  Type of service:  Video Visit  Video End Time:2:10 PM  Originating Location (pt. Location): Home  Distant Location (provider location):  [facility] PAIN MANAGEMENT [place]   Platform used for Video Visit: Adolfo                              [facility]    Date of visit: 4/14/2021    Chief complaint:   Chief Complaint   Patient presents with     Pain       Interval history:  Diego Meng is a 45 year old male here for Chronic Pain and Addiction - Suboxone Maintenance follow up.     OPIOID USE DISORDER - SUBOXONE MAT FOLLOW UP: INITIAL VISIT 2016, LAST FOLLOW UP 1/6/2021 VIRTUAL   CURRENT DOSE: SUBOXONE 8MG BID  adequate  BRIEF HPI: 45 YEAR OLD MALE who began having low back pain at age 20.  Pain continued and progressed to neck and right shoulder pain for the last 20 years.  History of high dose opioids after having a L4-5 decompression at [facility] by Dr. Rios. Dose escalated and got \"out of control\".  He admits to overuse.  He was transitioned to buprenorphine by Dr. Almonte and was transferred to myself for ongoing care. PMHx is significant for anxiety.     [place]  REVIEWED TODAY: YES  Gabapentin 600mg #90 4/6/2021  Oxycodone #46 5mg 4/6/2021  Suboxone 8mg 3/28/2021    TODAY HE REPORTS:   - Doing okay  - S/P cervical C5-6 disc replacement 1/12/2021. Right after the procedure he had a lot of localized pain on the right side of his neck.  He continues to have pain there but it IS slowly improving over time.  He states the plan is to just keep an eye on it and do muscle " "stretching, muscle relaxants and opioids.  All of his radiating arm pain that he had prior to surgery has resolved.    - \"I have to admit... this has been a nightmare\".  He does not want to ever have surgery again.  His pain was out of control after the surgery.  He has had many surgeries in the past where he has stayed on the suboxone and his pain was well managed so he does not understand why this one was so hard on him.   - He is STILL using large amounts of oxycodone - prescribed #49 tablets 5mg on 4/6/2021 with max of #8/day.  He does not think they are working at all because of the suboxone.  He continues to take Suboxone 8mg BID.    - Saw Dr. Kramer about bilateral knee pain on 4/5/2021.  He is recommending synvisc injections to be done a couple weeks after his covid vaccinations.   - First covid-19 vaccine on 4/2/2021, second scheduled for 4/22/2021  - He has a lot of questions about why his pain tolerance is so low.  He would like to get off everything (oxycodone, suboxone and klonopin) over the next year or so.   - He was put on Tizanidine after his surgery and was taken off Robaxin and he would like to go back on his Robaxin and discontinue his Tizanidine.   - He does not have any type of recovery program.   - He reports no side effects from the suboxone  - last UDS was on 10/10/2020 and was as expected - positive for benzos and buprenorphine   - The patient is not participating in individual therapy  - He has a strong family/social support system  - EFFEXOR 37.5mg daily was added by his PCP at .  She also increased his Klonopin to #4 tabs on weekdays and #2 tabs on weekends.  He has not been able to taper off this despite trials at decreasing his dose. He knows that it makes him feel tired and sedated. He is currently using 3 klonopin per day. No plans for titration on the Effexor, does think it helps   - Working long hours as an  in [place] that [place]. Working from home 3 days a week " and going into the office 2 days/week since covid-19 outbreak.   - Did 4 sessions of PT at [facility] and it was not helpful for his low back pain. Helpful for his thoracic back pain which is not longer painful for him.  Therapist Jose was fantastic.     - Not really participating in multidisciplinary care.  He saw Teresa Toledo - pain psychology on 7/11/2018.  Did not follow up      MEDICATIONS FOR PAIN:   Suboxone 8-2 mg #2/day (BRAND NAME ONLY)  OXYCODONE 5MG PRN - SINCE 1/12/2021   Excedrin #2-6/day for 20 years  lexapro 20 mg daily  Clonazepam 0.5mg #3/day  Robaxin 500-1000mg PRN QID  EFFEXOR 37.5MG PO DAILY    Past pain treatments:  Diego DENICE Meng has been seen at a pain clinic in the past. [facility] Dr. Vázquez and [facility]  Medications:                Medrol dose carolin -no relief              botox -no relief              Anti-migraine: fioricet, migranal, eletriptan, frovatriptan, midrin, naraptriptan, sumatriptan, zolmitripatn -no relief from any of these              Anti-convulsants: depakote, topiramate, gabapentin, lyrica -all of these made him feel fuzzy and forgetful and they were not helpful              Opioids: oxycontin, morphine -no relief, hydrocodone -no relief, tramadol -no relief                Muscle relaxants: soma -no relief, flexeril -no relief and made him tired, norflex -no relief, tizanidine -no relief and made him tired              NSAIDs: allergic to this class of medications -rash, abdominal pain and cramping, toradol -upset stomach              Anti-depressants: cymbalta -no relief                Topicals: lidocaine -no relief  PT: [facility] in 2016 -helped for shoulder but flared up back and neck  Psychology: yes for pain at the [facility] -'continues to use these skills'  Acupuncture: tried it -no relief  Chiropractic care: tried it -helped initially but then flared up pain  TENS Unit: no relief     Injections:    SHABANA 10/21/2020 - not helpful for his neck pain  Diagnostic C6 nerve root block CDI 12/8/2020  R L4 and L5 TF BAY 12/24/16 -flared pain  cervical epidural steroid injection -provided 2-4 weeks of relief but had significant flare up for 2 weeks post-procedure  cervical trigger point injections -minimal relief  lumbar medial branch block -no relief  lumbar paraspinal trigger point injection 5/17/17 - not helpful  Thoracic TRIGGER POINT INJECTION 7/27/2018.      Surgical history:    ARTIFICIAL DISC REPLACEMENT C5-6 Dr. Rios 1/12/2021  Right L5-S1 foraminotomy Dr. Rios May 19th, 2020   right L4-L5 hemilaminectomy May 2015 Dr. Rios  Right knee scope 9/25/2018  Left knee arthroscopy with medial meniscectomy 11/28/2016.     Imaging:  THORACIC MRI 3/23/2018:        CERVICAL MRI completed on 7/14/19:  IMPRESSION:  Mild degenerative disc changes in the mid cervical spine  as described above. Mild neural foraminal narrowings at C3-C4 and  C5-C6. No spinal canal narrowing at any level.      LUMBAR MRI completed on 7/14/19:  IMPRESSION:  Mild degenerative disc changes at L4-L5 and L5-S1. No  spinal canal or neural foraminal narrowing at any level. Overall,  there does not appear to be significant change since prior MR.     Right shoulder x-ray completed on 4/2/15:  FINDINGS: No evidence of fracture or dislocation. Normal glenohumeral joint alignment without evidence of degenerative changes. No evidence of significant acromioclavicular joint degenerative joint disease. No acromial or coracoid enthesophyte. Mild undersurface acromial sclerosis. Downslope type three acromion. No blastic or lytic lesions.  IMPRESSION:  Right shoulder type three acromion otherwise within normal limits.       Problem list and histories reviewed & adjusted, as indicated.  Additional history: as documented    Patient Active Problem List   Diagnosis     Postoperative back pain     Intractable back pain     Uncomplicated opioid dependence  (H)     Panic attack     BRIDGER (generalized anxiety disorder)     Hyperlipidemia LDL goal <160     Chronic pain syndrome     Encounter for long-term (current) use of high-risk medication     Severe episode of recurrent major depressive disorder, without psychotic features (H)     Myofascial pain     Mood disorder (H)     Benzodiazepine dependence, continuous (H)     Cervical radiculopathy     Past Surgical History:   Procedure Laterality Date     ARTHROSCOPY KNEE WITH MEDIAL MENISCECTOMY Left 2016    Procedure: ARTHROSCOPY KNEE WITH MEDIAL MENISCECTOMY;  Surgeon: Geovani Kaufman MD;  Location: [facility] OR     BACK SURGERY  2015    laminectomy, discectomy L4-L5     ORTHOPEDIC SURGERY Right 2018    Right Knee arthroscopy, partial med meniscectomy, DOS 2018, Dr. Kaufman. [facility]       Social History     Tobacco Use     Smoking status: Former Smoker     Packs/day: 1.00     Years: 20.00     Pack years: 20.00     Types: Cigarettes     Quit date: 2018     Years since quittin.7     Smokeless tobacco: Never Used     Tobacco comment: patch   Substance Use Topics     Alcohol use: No     Alcohol/week: 0.0 standard drinks     Family History   Problem Relation Age of Onset     Diabetes Father      Diabetes Paternal Grandfather      Hyperlipidemia Sister      Coronary Artery Disease No family hx of      Colon Cancer No family hx of      Prostate Cancer No family hx of          Current Outpatient Medications   Medication Sig Dispense Refill     atorvastatin (LIPITOR) 40 MG tablet Take 1 tablet (40 mg) by mouth daily 30 tablet 0     buprenorphine HCl-naloxone HCl (SUBOXONE) 8-2 MG per film Place 1 Film under the tongue 2 times daily XDEA = [id number] BRAND NAME 60 Film 2     clonazePAM (KLONOPIN) 1 MG tablet 25 tabs first two weeks (avg 1.8 tabs/day), 23 tabs second two weeks (avg 1.6 tabs/day) 48 tablet 0     escitalopram (LEXAPRO) 20 MG tablet Take 1 tablet (20 mg) by mouth daily 90 tablet 3      esomeprazole (NEXIUM) 20 MG capsule Take 1 capsule (20 mg) by mouth every morning (before breakfast) Take 30-60 minutes before eating. 30 capsule 0     methocarbamol (ROBAXIN) 500 MG tablet Take 1 tablet (500 mg) by mouth 3 times daily 90 tablet 2     omega 3 1000 MG CAPS Take 1 g by mouth daily 90 capsule      Allergies   Allergen Reactions     Nsaids Rash     Tolmetin Rash     Labs reviewed in Epic      Medications:  Current Outpatient Medications   Medication Sig Dispense Refill     atorvastatin (LIPITOR) 40 MG tablet Take 1 tablet (40 mg) by mouth daily 30 tablet 0     buprenorphine HCl-naloxone HCl (SUBOXONE) 8-2 MG per film Place 1 Film under the tongue 2 times daily XDEA = [id number] BRAND NAME 60 Film 2     clonazePAM (KLONOPIN) 1 MG tablet 25 tabs first two weeks (avg 1.8 tabs/day), 23 tabs second two weeks (avg 1.6 tabs/day) 48 tablet 0     escitalopram (LEXAPRO) 20 MG tablet Take 1 tablet (20 mg) by mouth daily 90 tablet 3     esomeprazole (NEXIUM) 20 MG capsule Take 1 capsule (20 mg) by mouth every morning (before breakfast) Take 30-60 minutes before eating. 30 capsule 0     methocarbamol (ROBAXIN) 500 MG tablet Take 1 tablet (500 mg) by mouth 3 times daily 90 tablet 2     omega 3 1000 MG CAPS Take 1 g by mouth daily 90 capsule        OBJECTIVE:                                                    There were no vitals taken for this visit.  There is no height or weight on file to calculate BMI.     ROS:  Constitutional, neuro, ENT, endocrine, pulmonary, cardiac, gastrointestinal, genitourinary, musculoskeletal, integument and psychiatric systems are negative, except as otherwise noted.    GENERAL: Healthy, alert and no distress  EYES: Eyes grossly normal to inspection.  No discharge or erythema, or obvious scleral/conjunctival abnormalities.  RESP: No audible wheeze, cough, or visible cyanosis.  No visible retractions or increased work of breathing.    SKIN: Visible skin clear. No significant rash,  abnormal pigmentation or lesions.  NEURO: Cranial nerves grossly intact.  Mentation and speech appropriate for age.  PSYCH: Mentation appears normal, affect normal/bright, judgement and insight intact, normal speech and appearance well-groomed.       Diagnostic Test Results:  No results found for this or any previous visit (from the past 24 hour(s)).       ASSESSMENT:                                                      PLAN:                                                      1. Chronic pain syndrome  CHRONIC LOW BACK PAIN - S/P L5-S1 R foraminotomy & right L4-5 hemilaminectomy with persistent central low back pain  CHRONIC BILATERAL KNEE PAIN - S/P L meniscus repair & R knee scope.  Recommend that he follow Dr. Kramer's recommendation for synvisc injections after his covid vaccines.   ACUTE CENTRAL RIGHT NECK PAIN - S/P artificial disc replacement 1/12/2021 with persistent pain.  Bilateral radicular arm pain has resolved  CHRONIC HEADACHES -  Etiology likely tension headaches.   CHRONIC THORACIC BACK PAIN - likely myofascial etiology     Discussed central sensitization which I believe greatly contributes to his pain.  We also talked about opioid induced hyperalgesia and physical tolerance today.   I am recommending that he discontinue use of Oxycodone, however, I will leave this to his neurosurgery team who is prescribing this.  Explained that he should not have withdrawal with discontinuation because he is also on Suboxone.  Also discussed how traditional opioids can still be effective for acute pain when taken in addition to suboxone.     Pain psychology has been recommended, however, patient went to initial consult and never followed up after that  Physical Therapy - recently finished a course of PT at [facility]     2. Uncomplicated opioid dependence (H)  COUNSELING done today:  He would like to eventually get off Suboxone and we talked about doing a year long taper vs transitioning to SUBLOCADE.  We can  discuss this in more detail when he is off the oxycodone.  I made a follow up appt in 2 months and we can begin to taper at that time.      Reinterated the importance of having a recovery program in addition to Suboxone maintenance.  This can include having a sober support network, not isolating, being open, honest, and willing to change, avoiding triggers and managing cravings.     In addition, he should abstain from alcohol, benzodiazepines, THC, opioids and other drugs of abuse. Risk of overdose following a period of abstinence due to decrease tolerance was discussed including risk of death. Risk of overdose if using Suboxone with other substances particuarly benzodiazepines/alcohol was reviewed.    - buprenorphine HCl-naloxone HCl (SUBOXONE) 8-2 MG per film; Place 1 Film under the tongue 2 times daily XDEA = [id number] BRAND NAME  Dispense: 60 Film; Refill: 1    3. Myofascial pain  Discontinue Tizanidine and restart robaxin.      - methocarbamol (ROBAXIN) 500 MG tablet; Take 1 tablet (500 mg) by mouth 3 times daily  Dispense: 90 tablet; Refill: 2    4. BRIDGER (generalized anxiety disorder)  On Klonopin prescribed by his PCP ([facility]).  He has been unable to wean off this despite efforts to do so.     5. Encounter for long-term (current) use of high-risk medication  High Risk Drug Monitoring?  YES  Drug being monitored: Suboxone   Reason for drug: Opioid Use Disorder  What is being monitored?: Dosage, Cravings, Trigger, side effects, and abstinence.     last UDS was on 10/10/2020 and was as expected - positive for benzos and buprenorphine       MEDICATIONS:   Orders Placed This Encounter   Medications     tiZANidine (ZANAFLEX) 2 MG tablet     gabapentin (NEURONTIN) 600 MG tablet     Sig: Take 600 mg by mouth 3 times daily     oxyCODONE (ROXICODONE) 5 MG tablet     Sig: Take 5-10 mg by mouth     venlafaxine (EFFEXOR-XR) 37.5 MG 24 hr capsule     Sig: Take 37.5 mg by mouth daily     clonazePAM (KLONOPIN) 0.5 MG  tablet     Sig: Take 0.5 mg by mouth 3 times daily     methocarbamol (ROBAXIN) 500 MG tablet     Sig: Take 1 tablet (500 mg) by mouth 3 times daily     Dispense:  90 tablet     Refill:  2     buprenorphine HCl-naloxone HCl (SUBOXONE) 8-2 MG per film     Sig: Place 1 Film under the tongue 2 times daily XDEA = [id number] BRAND NAME     Dispense:  60 Film     Refill:  1     NADEAN: [id number]          - Continue other medications without change    FUTURE APPOINTMENTS: every 12 weeks - 6/17/2021 @ 8AM IN PERSON    BILLING TIME DOCUMENTATION:   The total TIME spent on this patient on the date of the encounter/appointment was 50 minutes.      TOTAL TIME includes:   Time spent preparing to see the patient (reviewing records and tests) - 6 min  Time spent face to face (or over the phone) with the patient - 32 min  Time spent ordering tests, medications, procedures and referrals - 2 min  Time spent Referring and communicating with other healthcare professionals - 0 min  Time spent documenting clinical information in Epic - 10 min      MANJU KEITH MD   Pain Management & Addiction Medicine

## 2021-04-14 ENCOUNTER — VIRTUAL VISIT (OUTPATIENT)
Dept: PALLIATIVE MEDICINE | Facility: CLINIC | Age: 46
End: 2021-04-14
Payer: COMMERCIAL

## 2021-04-14 DIAGNOSIS — M79.18 MYOFASCIAL PAIN: ICD-10-CM

## 2021-04-14 DIAGNOSIS — F41.1 GAD (GENERALIZED ANXIETY DISORDER): ICD-10-CM

## 2021-04-14 DIAGNOSIS — F11.20 UNCOMPLICATED OPIOID DEPENDENCE (H): ICD-10-CM

## 2021-04-14 DIAGNOSIS — Z79.899 ENCOUNTER FOR LONG-TERM (CURRENT) USE OF HIGH-RISK MEDICATION: ICD-10-CM

## 2021-04-14 DIAGNOSIS — G89.4 CHRONIC PAIN SYNDROME: Primary | ICD-10-CM

## 2021-04-14 PROCEDURE — 99215 OFFICE O/P EST HI 40 MIN: CPT | Mod: 95 | Performed by: ANESTHESIOLOGY

## 2021-04-14 RX ORDER — CLONAZEPAM 0.5 MG/1
0.5 TABLET ORAL 3 TIMES DAILY
COMMUNITY
Start: 2021-03-22

## 2021-04-14 RX ORDER — BUPRENORPHINE AND NALOXONE 8; 2 MG/1; MG/1
1 FILM, SOLUBLE BUCCAL; SUBLINGUAL 2 TIMES DAILY
Qty: 60 FILM | Refills: 1 | Status: SHIPPED | OUTPATIENT
Start: 2021-04-14 | End: 2021-06-17

## 2021-04-14 RX ORDER — METHOCARBAMOL 500 MG/1
500 TABLET, FILM COATED ORAL 3 TIMES DAILY
Qty: 90 TABLET | Refills: 2 | Status: SHIPPED | OUTPATIENT
Start: 2021-04-14

## 2021-04-14 RX ORDER — VENLAFAXINE HYDROCHLORIDE 37.5 MG/1
37.5 CAPSULE, EXTENDED RELEASE ORAL DAILY
COMMUNITY
Start: 2021-03-02 | End: 2021-06-17

## 2021-04-14 RX ORDER — TIZANIDINE 2 MG/1
TABLET ORAL
COMMUNITY
Start: 2021-04-06 | End: 2021-06-17

## 2021-04-14 RX ORDER — GABAPENTIN 600 MG/1
600 TABLET ORAL 3 TIMES DAILY
COMMUNITY
Start: 2021-02-17 | End: 2021-06-17

## 2021-04-14 RX ORDER — OXYCODONE HYDROCHLORIDE 5 MG/1
5-10 TABLET ORAL
COMMUNITY
Start: 2021-04-06

## 2021-04-14 ASSESSMENT — PAIN SCALES - GENERAL: PAINLEVEL: SEVERE PAIN (6)

## 2021-05-06 ENCOUNTER — OFFICE VISIT (OUTPATIENT)
Dept: ORTHOPEDICS | Facility: CLINIC | Age: 46
End: 2021-05-06
Payer: COMMERCIAL

## 2021-05-06 VITALS
SYSTOLIC BLOOD PRESSURE: 120 MMHG | WEIGHT: 228 LBS | HEIGHT: 71 IN | BODY MASS INDEX: 31.92 KG/M2 | DIASTOLIC BLOOD PRESSURE: 76 MMHG

## 2021-05-06 DIAGNOSIS — M17.11 PRIMARY OSTEOARTHRITIS OF RIGHT KNEE: ICD-10-CM

## 2021-05-06 DIAGNOSIS — M17.12 PRIMARY OSTEOARTHRITIS OF LEFT KNEE: Primary | ICD-10-CM

## 2021-05-06 PROCEDURE — 20610 DRAIN/INJ JOINT/BURSA W/O US: CPT | Mod: 50 | Performed by: ORTHOPAEDIC SURGERY

## 2021-05-06 ASSESSMENT — MIFFLIN-ST. JEOR: SCORE: 1943.39

## 2021-05-06 NOTE — LETTER
"    5/6/2021         RE: Diego Meng  [address]  [facility] [address]        Dear Colleague,    Thank you for referring your patient, Diego Meng, to the [facility] [place]. Please see a copy of my visit note below.    HISTORY OF PRESENT ILLNESS:    Diego Meng is a 45 year old male who is seen in follow up for bilateral knee pain, and SynviscOne injections. Patient was last evaluated on 4/8/21, and prior authroization obtained for SynviscOne.   Present symptoms: Patient reports continued bilateral anterior knee pain. He reports pain is present on either sides of the patella, and behind the patella. Pain is more severe along the left lateral aspect, and right medial aspect of the patella.  Patient reports knee pain is improved in the mornings, and worsens throughout the day with ambulation and stairs.   Current pain level: 7/10  Treatments tried to this point: rest, Subaxone  Past Medical History: Unchanged from the visit of 7/22/20. Please refer to that note.    REVIEW OF SYSTEMS:  CONSTITUTIONAL:  NEGATIVE for fever, chills, change in weight  INTEGUMENTARY/SKIN:  NEGATIVE for worrisome rashes, moles or lesions  EYES:  NEGATIVE for vision changes or irritation  ENT/MOUTH:  NEGATIVE for ear, mouth and throat problems  RESP:  NEGATIVE for significant cough or SOB  BREAST:  NEGATIVE for masses, tenderness or discharge  CV:  NEGATIVE for chest pain, palpitations or peripheral edema  GI: reflux, heartburn  :  Negative   MUSCULOSKELETAL:  See HPI above  NEURO:  NEGATIVE for weakness, dizziness or paresthesias  ENDOCRINE:  NEGATIVE for temperature intolerance, skin/hair changes  HEME/ALLERGY/IMMUNE:  NEGATIVE for bleeding problems  PSYCHIATRIC:  Panic attacks    PHYSICAL EXAM:  /76 (BP Location: Right arm, Patient Position: Chair, Cuff Size: Adult Regular)   Ht 1.791 m (5' 10.5\")   Wt 103.4 kg (228 lb)   BMI 32.25 kg/m    Body mass index is " 32.25 kg/m .   GENERAL APPEARANCE: healthy, alert and no distress   SKIN: no suspicious lesions or rashes and jaundice  NEURO: Normal strength and tone, mentation intact and speech normal  VASCULAR:  good pulses, and cappillary refill   LYMPH: no lymphadenopathy   PSYCH:  mentation appears normal and affect normal/bright    MSK:  No contraindication for injections today  Skin is clear without any erythema or other rash  No effusion noted    IMAGING INTERPRETATION:  None taken today.     ASSESSMENT:  Bilateral knee DJD    PLAN:  Tolerated Synvisc 1 injections well today.  Follow-up as needed.     Large Joint Injection/Arthocentesis: bilateral knee    Date/Time: 5/6/2021 8:13 AM  Performed by: Serafin Kramer MD  Authorized by: Serafin Kramer MD     Indications:  Pain and osteoarthritis  Needle Size:  22 G  Guidance: landmark guided    Approach:  Anterolateral  Location:  Knee  Laterality:  Bilateral      Medications (Right):  48 mg hylan 48 MG/6ML  Medications (Left):  48 mg hylan 48 MG/6ML  Outcome:  Tolerated well, no immediate complications  Procedure discussed: discussed risks, benefits, and alternatives    Consent Given by:  Patient  Timeout: timeout called immediately prior to procedure              Serafin Kramer MD  Dept. Orthopedic Surgery  [facility]       Disclaimer: This note consists of symbols derived from keyboarding, dictation and/or voice recognition software. As a result, there may be errors in the script that have gone undetected. Please consider this when interpreting information found in this chart.        Again, thank you for allowing me to participate in the care of your patient.        Sincerely,        Serafin Kramer MD

## 2021-05-06 NOTE — PATIENT INSTRUCTIONS
SynviscOne injections provided today in both knees.   Would not soak knees x2 days.   Medication may take 3-4 weeks to provide initial relief.   OK to use Tylenol, Ibuprofen, and icing for additional pain relief during this time.   Able to repeat these injections 6 months, and 1 day per your insurance.     Call my office with any questions, [phone].

## 2021-05-06 NOTE — PROGRESS NOTES
"HISTORY OF PRESENT ILLNESS:    Diego Meng is a 45 year old male who is seen in follow up for bilateral knee pain, and SynviscOne injections. Patient was last evaluated on 4/8/21, and prior authroization obtained for SynviscOne.   Present symptoms: Patient reports continued bilateral anterior knee pain. He reports pain is present on either sides of the patella, and behind the patella. Pain is more severe along the left lateral aspect, and right medial aspect of the patella.  Patient reports knee pain is improved in the mornings, and worsens throughout the day with ambulation and stairs.   Current pain level: 7/10  Treatments tried to this point: rest, Subaxone  Past Medical History: Unchanged from the visit of 7/22/20. Please refer to that note.    REVIEW OF SYSTEMS:  CONSTITUTIONAL:  NEGATIVE for fever, chills, change in weight  INTEGUMENTARY/SKIN:  NEGATIVE for worrisome rashes, moles or lesions  EYES:  NEGATIVE for vision changes or irritation  ENT/MOUTH:  NEGATIVE for ear, mouth and throat problems  RESP:  NEGATIVE for significant cough or SOB  BREAST:  NEGATIVE for masses, tenderness or discharge  CV:  NEGATIVE for chest pain, palpitations or peripheral edema  GI: reflux, heartburn  :  Negative   MUSCULOSKELETAL:  See HPI above  NEURO:  NEGATIVE for weakness, dizziness or paresthesias  ENDOCRINE:  NEGATIVE for temperature intolerance, skin/hair changes  HEME/ALLERGY/IMMUNE:  NEGATIVE for bleeding problems  PSYCHIATRIC:  Panic attacks    PHYSICAL EXAM:  /76 (BP Location: Right arm, Patient Position: Chair, Cuff Size: Adult Regular)   Ht 1.791 m (5' 10.5\")   Wt 103.4 kg (228 lb)   BMI 32.25 kg/m    Body mass index is 32.25 kg/m .   GENERAL APPEARANCE: healthy, alert and no distress   SKIN: no suspicious lesions or rashes and jaundice  NEURO: Normal strength and tone, mentation intact and speech normal  VASCULAR:  good pulses, and cappillary refill   LYMPH: no lymphadenopathy   PSYCH:  " mentation appears normal and affect normal/bright    MSK:  No contraindication for injections today  Skin is clear without any erythema or other rash  No effusion noted    IMAGING INTERPRETATION:  None taken today.     ASSESSMENT:  Bilateral knee DJD    PLAN:  Tolerated Synvisc 1 injections well today.  Follow-up as needed.     Large Joint Injection/Arthocentesis: bilateral knee    Date/Time: 5/6/2021 8:13 AM  Performed by: Serafin Kramer MD  Authorized by: Serafin Kramer MD     Indications:  Pain and osteoarthritis  Needle Size:  22 G  Guidance: landmark guided    Approach:  Anterolateral  Location:  Knee  Laterality:  Bilateral      Medications (Right):  48 mg hylan 48 MG/6ML  Medications (Left):  48 mg hylan 48 MG/6ML  Outcome:  Tolerated well, no immediate complications  Procedure discussed: discussed risks, benefits, and alternatives    Consent Given by:  Patient  Timeout: timeout called immediately prior to procedure              Serafin Kramer MD  Dept. Orthopedic Surgery  [facility]       Disclaimer: This note consists of symbols derived from keyboarding, dictation and/or voice recognition software. As a result, there may be errors in the script that have gone undetected. Please consider this when interpreting information found in this chart.

## 2021-06-16 NOTE — PROGRESS NOTES
"[facility]    Date of visit: 6/17/2021    Chief complaint:   Chief Complaint   Patient presents with     Pain       Interval history:  Diego Meng is a 45 year old male here for Chronic Pain and Addiction - Suboxone Maintenance follow up.     OPIOID USE DISORDER - SUBOXONE MAT FOLLOW UP: INITIAL VISIT 2016, LAST FOLLOW UP 4/14/2021 VIRTUAL   CURRENT DOSE: SUBOXONE 8MG BID  adequate  BRIEF HPI: 45 YEAR OLD MALE who began having low back pain at age 20.  Pain continued and progressed to neck, low back and right shoulder pain for the last 20 years.  History of high dose opioids with admitted abuse and misuse.  His dose escalated and got \"out of control\".   He was transitioned to buprenorphine by Dr. Almonte and was transferred to myself for ongoing care. PMHx is significant for anxiety.   LAST UDS OBTAINED: 10/10/2020 and was as expected.  Repeated today and awaiting results.   PCP: [facility]  REVIEWED TODAY: YES  Gabapentin 600mg #90 4/6/2021  Oxycodone #46 5mg 4/6/2021  Suboxone 8mg #60 5/14/2021  Klonopin 0.5mg #90 6/12/2021    TODAY HE REPORTS:   - Doing okay  - He has been able to decrease his suboxone from 16mg daily to 8mg daily.   - S/P bilateral synvisc knee injections with Dr. Kramer on 5/6/2021  - S/P L4-5 decompression at [facility] in 2015 by Dr. Rios.  He wants to follow up with him, get another MRI and consider lumbar surgery.   - S/P cervical C5-6 disc replacement 1/12/2021.   All of his radiating arm pain that he had prior to surgery has resolved.  Last oxycodone use was in April    - Vaccinated for covid.   - He has a lot of questions about why his pain tolerance is so low.  He would like to get off everything (oxycodone, suboxone and klonopin) over the next year or so.   - He does not have any type of recovery program.   - He reports no side effects from the suboxone  - The patient is not participating in individual " therapy  - He has a strong family/social support system  - Working long hours as an  in [place] that [place]. Working from home 3 days a week and going into the office 2 days/week since covid-19 outbreak.   - Did 4 sessions of PT at [facility] and it was not helpful for his low back pain. Helpful for his thoracic back pain which is not longer painful for him.  Therapist Jose was fantastic.         MEDICATIONS FOR PAIN:   Suboxone 8-2 mg #1/day (BRAND NAME ONLY)  Excedrin #2-6/day for 20 years  lexapro 20 mg daily  Clonazepam 0.5mg #3/day  Robaxin 500-1000mg PRN QID  EFFEXOR 37.5MG PO DAILY    Past pain treatments:  Diego Meng has been seen at a pain clinic in the past. [facility] Dr. Vázquez and [facility]  Medications:                Medrol dose carolin -no relief              botox -no relief              Anti-migraine: fioricet, migranal, eletriptan, frovatriptan, midrin, naraptriptan, sumatriptan, zolmitripatn -no relief from any of these              Anti-convulsants: depakote, topiramate, gabapentin, lyrica -all of these made him feel fuzzy and forgetful and they were not helpful              Opioids: oxycontin, morphine -no relief, hydrocodone -no relief, tramadol -no relief                Muscle relaxants: soma -no relief, flexeril -no relief and made him tired, norflex -no relief, tizanidine -no relief and made him tired              NSAIDs: allergic to this class of medications -rash, abdominal pain and cramping, toradol -upset stomach              Anti-depressants: cymbalta -no relief                Topicals: lidocaine -no relief  PT: [facility] in 2016 -helped for shoulder but flared up back and neck  Psychology: yes for pain at the [facility] -'continues to use these skills'  Acupuncture: tried it -no relief  Chiropractic care: tried it -helped initially but then flared up pain  TENS Unit: no relief     Injections:   SHABANA  10/21/2020 - not helpful for his neck pain  Diagnostic C6 nerve root block CDI 12/8/2020  R L4 and L5 TF BAY 12/24/16 -flared pain  cervical epidural steroid injection -provided 2-4 weeks of relief but had significant flare up for 2 weeks post-procedure  cervical trigger point injections -minimal relief  lumbar medial branch block -no relief  lumbar paraspinal trigger point injection 5/17/17 - not helpful  Thoracic TRIGGER POINT INJECTION 7/27/2018.      Surgical history:    ARTIFICIAL DISC REPLACEMENT C5-6 Dr. Rios 1/12/2021  Right L5-S1 foraminotomy Dr. Rios May 19th, 2020   right L4-L5 hemilaminectomy May 2015 Dr. Rios  Right knee scope 9/25/2018  Left knee arthroscopy with medial meniscectomy 11/28/2016.     Imaging:  THORACIC MRI 3/23/2018:        CERVICAL MRI completed on 7/14/19:  IMPRESSION:  Mild degenerative disc changes in the mid cervical spine  as described above. Mild neural foraminal narrowings at C3-C4 and  C5-C6. No spinal canal narrowing at any level.      LUMBAR MRI completed on 7/14/19:  IMPRESSION:  Mild degenerative disc changes at L4-L5 and L5-S1. No  spinal canal or neural foraminal narrowing at any level. Overall,  there does not appear to be significant change since prior MR.     Right shoulder x-ray completed on 4/2/15:  FINDINGS: No evidence of fracture or dislocation. Normal glenohumeral joint alignment without evidence of degenerative changes. No evidence of significant acromioclavicular joint degenerative joint disease. No acromial or coracoid enthesophyte. Mild undersurface acromial sclerosis. Downslope type three acromion. No blastic or lytic lesions.  IMPRESSION:  Right shoulder type three acromion otherwise within normal limits.       Problem list and histories reviewed & adjusted, as indicated.  Additional history: as documented    Patient Active Problem List   Diagnosis     Postoperative back pain     Intractable back pain     Uncomplicated opioid dependence (H)      Panic attack     BRIDGER (generalized anxiety disorder)     Hyperlipidemia LDL goal <160     Chronic pain syndrome     Encounter for long-term (current) use of high-risk medication     Severe episode of recurrent major depressive disorder, without psychotic features (H)     Myofascial pain     Mood disorder (H)     Benzodiazepine dependence, continuous (H)     Cervical radiculopathy     Past Surgical History:   Procedure Laterality Date     ARTHROSCOPY KNEE WITH MEDIAL MENISCECTOMY Left 2016    Procedure: ARTHROSCOPY KNEE WITH MEDIAL MENISCECTOMY;  Surgeon: Geovani Kaufman MD;  Location: [facility] OR     BACK SURGERY  2015    laminectomy, discectomy L4-L5     ORTHOPEDIC SURGERY Right 2018    Right Knee arthroscopy, partial med meniscectomy, DOS 2018, Dr. Kaufman. [facility]       Social History     Tobacco Use     Smoking status: Former Smoker     Packs/day: 1.00     Years: 20.00     Pack years: 20.00     Types: Cigarettes     Quit date: 2018     Years since quittin.8     Smokeless tobacco: Never Used   Substance Use Topics     Alcohol use: No     Alcohol/week: 0.0 standard drinks     Family History   Problem Relation Age of Onset     Diabetes Father      Diabetes Paternal Grandfather      Hyperlipidemia Sister      Coronary Artery Disease No family hx of      Colon Cancer No family hx of      Prostate Cancer No family hx of          Current Outpatient Medications   Medication Sig Dispense Refill     buprenorphine HCl-naloxone HCl (SUBOXONE) 8-2 MG per film Place 1 Film under the tongue daily XDEA = [id number] BRAND NAME 30 Film 2     gabapentin (NEURONTIN) 100 MG capsule Take 1 capsule (100 mg) by mouth 3 times daily 90 capsule 2     atorvastatin (LIPITOR) 40 MG tablet Take 1 tablet (40 mg) by mouth daily 30 tablet 0     clonazePAM (KLONOPIN) 0.5 MG tablet Take 0.5 mg by mouth 3 times daily       escitalopram (LEXAPRO) 20 MG tablet Take 1 tablet (20 mg) by mouth daily 90 tablet  3     esomeprazole (NEXIUM) 20 MG capsule Take 1 capsule (20 mg) by mouth every morning (before breakfast) Take 30-60 minutes before eating. 30 capsule 0     methocarbamol (ROBAXIN) 500 MG tablet Take 1 tablet (500 mg) by mouth 3 times daily 90 tablet 2     omega 3 1000 MG CAPS Take 1 g by mouth daily 90 capsule      oxyCODONE (ROXICODONE) 5 MG tablet Take 5-10 mg by mouth       Allergies   Allergen Reactions     Nsaids Rash     Tolmetin Rash     Labs reviewed in Epic      Medications:  Current Outpatient Medications   Medication Sig Dispense Refill     buprenorphine HCl-naloxone HCl (SUBOXONE) 8-2 MG per film Place 1 Film under the tongue daily XDEA = [id number] BRAND NAME 30 Film 2     gabapentin (NEURONTIN) 100 MG capsule Take 1 capsule (100 mg) by mouth 3 times daily 90 capsule 2     atorvastatin (LIPITOR) 40 MG tablet Take 1 tablet (40 mg) by mouth daily 30 tablet 0     clonazePAM (KLONOPIN) 0.5 MG tablet Take 0.5 mg by mouth 3 times daily       escitalopram (LEXAPRO) 20 MG tablet Take 1 tablet (20 mg) by mouth daily 90 tablet 3     esomeprazole (NEXIUM) 20 MG capsule Take 1 capsule (20 mg) by mouth every morning (before breakfast) Take 30-60 minutes before eating. 30 capsule 0     methocarbamol (ROBAXIN) 500 MG tablet Take 1 tablet (500 mg) by mouth 3 times daily 90 tablet 2     omega 3 1000 MG CAPS Take 1 g by mouth daily 90 capsule      oxyCODONE (ROXICODONE) 5 MG tablet Take 5-10 mg by mouth         OBJECTIVE:                                                    BP (!) 152/84   Pulse 88   SpO2 97%   There is no height or weight on file to calculate BMI.     ROS:  Constitutional, neuro, ENT, endocrine, pulmonary, cardiac, gastrointestinal, genitourinary, musculoskeletal, integument and psychiatric systems are negative, except as otherwise noted.    GENERAL: Healthy, alert and no distress  EYES: Eyes grossly normal to inspection.  No discharge or erythema, or obvious scleral/conjunctival abnormalities.  RESP:  No audible wheeze, cough, or visible cyanosis.  No visible retractions or increased work of breathing.    SKIN: Visible skin clear. No significant rash, abnormal pigmentation or lesions.  NEURO: Cranial nerves grossly intact.  Mentation and speech appropriate for age.  PSYCH: Mentation appears normal, affect normal/bright, judgement and insight intact, normal speech and appearance well-groomed.       Diagnostic Test Results:  No results found for this or any previous visit (from the past 24 hour(s)).       ASSESSMENT:                                                      PLAN:                                                      1. Chronic pain syndrome  CHRONIC LOW BACK PAIN - S/P L5-S1 R foraminotomy & right L4-5 hemilaminectomy with persistent central low back pain.  Follow up with Dr. Rios to see if a repeat MRI or surgery are indicated.   CHRONIC BILATERAL KNEE PAIN - S/P L meniscus repair & R knee scope.  Recent Synvisc injections were not helpful.  I am recommending he [facility].    ACUTE CENTRAL RIGHT NECK PAIN - S/P artificial disc replacement 1/12/2021 with persistent pain.  Bilateral radicular arm pain has resolved  CHRONIC HEADACHES -  Etiology likely tension headaches.   CHRONIC THORACIC BACK PAIN - likely myofascial etiology     Discussed central sensitization which I believe greatly contributes to his pain.  We also talked about opioid induced hyperalgesia and physical tolerance today.   Also discussed how traditional opioids can still be effective for acute pain when taken in addition to suboxone.     Pain psychology has been recommended, however, patient went to initial consult and never followed up after that  Physical Therapy - recently finished a course of PT at [facility]     2. Uncomplicated opioid dependence (H)  COUNSELING done today:  He would like to eventually get off Suboxone and we talked about doing a year long taper using belbuca/butrans patch vs transitioning to SUBLOCADE.   Call when you are down to 2mg a day and we will switch you to either belbuca or butrans with the hopes of getting you off suboxone in the next year.     Reinterated the importance of having a recovery program in addition to Suboxone maintenance.  This can include having a sober support network, not isolating, being open, honest, and willing to change, avoiding triggers and managing cravings.     In addition, he should abstain from alcohol, benzodiazepines, THC, opioids and other drugs of abuse. Risk of overdose following a period of abstinence due to decrease tolerance was discussed including risk of death. Risk of overdose if using Suboxone with other substances particuarly benzodiazepines/alcohol was reviewed.    If you have another surgery I would recommend that you decrease to 2-4mg one week prior to the surgery and continue to take this IN ADDITION to whatever you are given for post operative pain.     - Continue buprenorphine HCl-naloxone HCl (SUBOXONE) 8-2 MG per film; Place 1 Film under the tongue daily     3. Chondromalacia of patella, unspecified laterality  Start PT at [facility]       - Continue  (ROBAXIN) 500 MG tablet; Take 1 tablet (500 mg) by mouth 3 times daily PRN    4. BRIDGER (generalized anxiety disorder)  On Klonopin prescribed by his PCP ([facility]). He is working to wean off this. .     5. Encounter for long-term (current) use of high-risk medication  High Risk Drug Monitoring?  YES  Drug being monitored: Suboxone   Reason for drug: Opioid Use Disorder  What is being monitored?: Dosage, Cravings, Trigger, side effects, and abstinence.      MEDICATIONS:   Orders Placed This Encounter   Medications     buprenorphine HCl-naloxone HCl (SUBOXONE) 8-2 MG per film     Sig: Place 1 Film under the tongue daily XDEA = [id number] BRAND NAME     Dispense:  30 Film     Refill:  2     NADEAN: [id number]     gabapentin (NEURONTIN) 100 MG capsule     Sig: Take 1 capsule (100 mg) by mouth 3 times daily     Dispense:   90 capsule     Refill:  2          - Continue other medications without change    FUTURE APPOINTMENTS: every 12 weeks - 9/9/2021 @ 9am IN PERSON     BILLING TIME DOCUMENTATION:   The total TIME spent on this patient on the date of the encounter/appointment was 47 minutes.      TOTAL TIME includes:   Time spent preparing to see the patient (reviewing records and tests) - 2 min  Time spent face to face (or over the phone) with the patient - 35 min  Time spent ordering tests, medications, procedures and referrals - 2 min  Time spent Referring and communicating with other healthcare professionals - 0 min  Time spent documenting clinical information in Epic - 8 min        MANJU KEITH MD   Pain Management & Addiction Medicine

## 2021-06-17 ENCOUNTER — OFFICE VISIT (OUTPATIENT)
Dept: PALLIATIVE MEDICINE | Facility: CLINIC | Age: 46
End: 2021-06-17
Payer: COMMERCIAL

## 2021-06-17 VITALS — OXYGEN SATURATION: 97 % | DIASTOLIC BLOOD PRESSURE: 84 MMHG | SYSTOLIC BLOOD PRESSURE: 152 MMHG | HEART RATE: 88 BPM

## 2021-06-17 DIAGNOSIS — M22.40 CHONDROMALACIA OF PATELLA, UNSPECIFIED LATERALITY: ICD-10-CM

## 2021-06-17 DIAGNOSIS — F11.20 UNCOMPLICATED OPIOID DEPENDENCE (H): ICD-10-CM

## 2021-06-17 DIAGNOSIS — Z79.899 ENCOUNTER FOR LONG-TERM (CURRENT) USE OF HIGH-RISK MEDICATION: ICD-10-CM

## 2021-06-17 DIAGNOSIS — F41.1 GAD (GENERALIZED ANXIETY DISORDER): ICD-10-CM

## 2021-06-17 DIAGNOSIS — G89.4 CHRONIC PAIN SYNDROME: Primary | ICD-10-CM

## 2021-06-17 PROCEDURE — 80306 DRUG TEST PRSMV INSTRMNT: CPT | Performed by: ANESTHESIOLOGY

## 2021-06-17 PROCEDURE — 99215 OFFICE O/P EST HI 40 MIN: CPT | Performed by: ANESTHESIOLOGY

## 2021-06-17 RX ORDER — BUPRENORPHINE AND NALOXONE 8; 2 MG/1; MG/1
1 FILM, SOLUBLE BUCCAL; SUBLINGUAL DAILY
Qty: 30 FILM | Refills: 2 | Status: SHIPPED | OUTPATIENT
Start: 2021-06-17

## 2021-06-17 RX ORDER — GABAPENTIN 100 MG/1
100 CAPSULE ORAL 3 TIMES DAILY
Qty: 90 CAPSULE | Refills: 2 | Status: SHIPPED | OUTPATIENT
Start: 2021-06-17

## 2021-06-17 ASSESSMENT — PAIN SCALES - GENERAL: PAINLEVEL: SEVERE PAIN (7)

## 2021-06-25 ENCOUNTER — THERAPY VISIT (OUTPATIENT)
Dept: PHYSICAL THERAPY | Facility: CLINIC | Age: 46
End: 2021-06-25
Payer: COMMERCIAL

## 2021-06-25 ENCOUNTER — MYC MEDICAL ADVICE (OUTPATIENT)
Dept: PALLIATIVE MEDICINE | Facility: CLINIC | Age: 46
End: 2021-06-25

## 2021-06-25 DIAGNOSIS — M22.40 CHONDROMALACIA OF PATELLA, UNSPECIFIED LATERALITY: ICD-10-CM

## 2021-06-25 DIAGNOSIS — M54.16 LUMBAR RADICULOPATHY: Primary | ICD-10-CM

## 2021-06-25 PROCEDURE — 97110 THERAPEUTIC EXERCISES: CPT | Mod: GP | Performed by: PHYSICAL THERAPIST

## 2021-06-25 PROCEDURE — 97530 THERAPEUTIC ACTIVITIES: CPT | Mod: GP | Performed by: PHYSICAL THERAPIST

## 2021-06-25 PROCEDURE — 97161 PT EVAL LOW COMPLEX 20 MIN: CPT | Mod: GP | Performed by: PHYSICAL THERAPIST

## 2021-06-25 ASSESSMENT — ACTIVITIES OF DAILY LIVING (ADL)
SIT WITH YOUR KNEE BENT: ACTIVITY IS SOMEWHAT DIFFICULT
KNEEL ON THE FRONT OF YOUR KNEE: I AM UNABLE TO DO THE ACTIVITY
GO UP STAIRS: ACTIVITY IS VERY DIFFICULT
WALK: ACTIVITY IS FAIRLY DIFFICULT
KNEE_ACTIVITY_OF_DAILY_LIVING_SUM: 34
KNEE_ACTIVITY_OF_DAILY_LIVING_SCORE: 48.57
GO DOWN STAIRS: ACTIVITY IS SOMEWHAT DIFFICULT
PAIN: THE SYMPTOM AFFECTS MY ACTIVITY SEVERELY
STAND: ACTIVITY IS FAIRLY DIFFICULT
SWELLING: I DO NOT HAVE THE SYMPTOM
LIMPING: THE SYMPTOM AFFECTS MY ACTIVITY MODERATELY
RAW_SCORE: 34
WEAKNESS: I HAVE THE SYMPTOM BUT IT DOES NOT AFFECT MY ACTIVITY
STIFFNESS: THE SYMPTOM AFFECTS MY ACTIVITY SLIGHTLY
SQUAT: ACTIVITY IS VERY DIFFICULT
GIVING WAY, BUCKLING OR SHIFTING OF KNEE: THE SYMPTOM AFFECTS MY ACTIVITY SLIGHTLY
RISE FROM A CHAIR: ACTIVITY IS MINIMALLY DIFFICULT

## 2021-06-25 NOTE — TELEPHONE ENCOUNTER
Devon message from patient on 06/25/2021 at 1023: Hi Dr Vallecillo,  could you please send an order over the same PT clinic for my lumbar spine....? I will have to jump thru the PT hoops to get the lumbar fusion procedure approved by insurance.     I went this morning for my knees.  Total game changer, so far. But, my lumbar is still a hot mess.      They said it would be better to have the same referring physician and they will treat both my knee and lumbar spine simultaneously.     Let me know.  Thanks!!!  Diego Meng  ____________________________________________    Routing to provider to review and advise    Regina Agosto RN  Care Coordinator  [facility]

## 2021-06-25 NOTE — TELEPHONE ENCOUNTER
[employer] message sent to patient: Good Afternoon Dr Avel Cortez is review your [employer] message. She did sent over another order for physical therapy for your lumbar spine to the [facility].  They can be reached at [phone].  Hope you have a great day.    Take Care,    Regina Agosto RN  Care Coordinator  [facility]

## 2021-06-25 NOTE — PROGRESS NOTES
Physical Therapy Initial Examination/Evaluation  June 25, 2021    Diego Meng is a 45 year old male referred to physical therapy by Reba Vallecillo MD for treatment of B knee chondromalacia Precautions/Restrictions/MD instructions none    Therapist Impression:   Pt presents with knee ROM and strength deficits leading to functional limitations.     Subjective:  DOI/onset: 6/10/2021 DOS: na  Acute Injury or Gradual Onset?: Gradual injury over time  Mechanism of Injury: unknown  Previous Treatment: Tylenol, Ibuprofen, Pain medications and PT Effect of prior treatment: fair  Imaging: None  Chief Complaint/Functional Limitations:   Pt reports meniscal tears in each knee that he had surgically removed. Recently pain has developed pain in anterior knee. He saw Dr. Kramer 1 month ago and received injections that gave minor pain relief. He wasn't happy with this so he went and saw his pain specialist Dr. Vallecillo. He has since been doing some exercises on the internet and they have helped. His knees have limited his ability to stand without pain. He notes significant cracking and popping with standing. Walking for any period of time is miserable.   and see below in therapy evaluation codes   Pain: rest 2 /10, activity 7/10 Location: ant/inf/lat knee surrounding patella  Frequency: Intermittent Described as: aching, dull and sharp Alleviated by: Tylenol, Ibuprofen, Pain medications and subaxone Progression of Symptoms: Rapidly getting worse. Time of day when pain is worse: Position related  Sleeping: No issues/uninterrupted   Occupation:   Job duties: prolonged sitting, keyboarding/computer use, prolonged standing, prolonged walking, repetitive tasks  Current HEP/exercise regimen: Online HEP   Patient's goals are see chief complaints     Other pertinent PMH/Red Flags: Depression, Mental Illness, Migraines/Headaches, Osteoarthritis, Severe Headaches   Barriers at home/work: None as reported by patient  Pertinent  "Surgical History: B menisectomies   Medications: Anti-seizure, Pain and Anti-depressants  General health as reported by patient: fair  Return to MD:  As needed     KNEE:    PROM:   L  R   Hyperextension 0 0   Extension 4 2   Flexion 135 135     Strength:   L R   HIP     Flex 4+ 4+   Ext 4- 4-   Abd 4- 4-   KNEE     Flex 4 4   Ext 4 4     Hip PROM:     L R   IR wnl wnl   ER wnl wnl   Ella's wnl wnl   Ambrosio wnl wnl       Special tests:   L R   Anterior Drawer - -   Posterior Drawer - -   Lachman's - -   Valgus 0 degrees - -   Valgus 30 degrees - -   Varus 0 degrees -- -   Varus 30 degrees - -   Kamar's - -   Appley's - -   Lateral Compression - -   Patellar Compression + +       Palpation: tender over inferior/lateral patella    Patellar tracking: normal    Functional:    L R   Squat Anterior knee translation    SLS 30\" 30\"   SL Squat Anterior knee translation, pain Anterior knee translation, pain            Gait: no gait abnormalities noted      Assessment/Plan:  Patient is a 45 year old male with both sides knee complaints.    Patient has the following significant findings with corresponding treatment plan.                Diagnosis 1:  B knee pain  Pain -  hot/cold therapy, electric stimulation, manual therapy, splint/taping/bracing/orthotics, self management, education and home program  Decreased ROM/flexibility - manual therapy and therapeutic exercise  Decreased joint mobility - manual therapy and therapeutic exercise  Decreased strength - therapeutic exercise and therapeutic activities  Decreased proprioception - neuro re-education and therapeutic activities    Therapy Evaluation Codes:     Cumulative Therapy Evaluation is: Low complexity.    Previous and current functional limitations:  (See Goal Flow Sheet for this information)    Short term and Long term goals: (See Goal Flow Sheet for this information)     Communication ability:  Patient appears to be able to clearly communicate and understand verbal and " written communication and follow directions correctly.  Treatment Explanation - The following has been discussed with the patient:   RX ordered/plan of care  Anticipated outcomes  Possible risks and side effects  This patient would benefit from PT intervention to resume normal activities.   Rehab potential is good.    Frequency:  1 X week, once daily  Duration:  for 8 visits  Discharge Plan:  Achieve all LTG.  Independent in home treatment program.  Reach maximal therapeutic benefit.    Please refer to the daily flowsheet for treatment today, total treatment time and time spent performing 1:1 timed codes.

## 2021-07-21 ENCOUNTER — THERAPY VISIT (OUTPATIENT)
Dept: PHYSICAL THERAPY | Facility: CLINIC | Age: 46
End: 2021-07-21
Payer: COMMERCIAL

## 2021-07-21 DIAGNOSIS — M54.16 LUMBAR RADICULOPATHY: ICD-10-CM

## 2021-07-21 PROCEDURE — 97161 PT EVAL LOW COMPLEX 20 MIN: CPT | Mod: GP | Performed by: PHYSICAL THERAPIST

## 2021-07-21 PROCEDURE — 97110 THERAPEUTIC EXERCISES: CPT | Mod: GP | Performed by: PHYSICAL THERAPIST

## 2021-07-21 NOTE — PROGRESS NOTES
Physical Therapy Initial Examination/Evaluation  July 21, 2021    Diego Meng is a 45 year old male referred to physical therapy by Reba Vallecillo MD for treatment of low back pain Precautions/Restrictions/MD instructions none      Subjective:  DOI/onset: Ongoing for years DOS: na  Acute Injury or Gradual Onset?: Gradual injury over time  Mechanism of Injury: unknown  Previous Treatment: Surgery Effect of prior treatment: good, fair  Imaging: x-ray and MRI  Chief Complaint/Functional Limitations:   Pt notes back pain that has been ongoing for many years. He has been working with his surgeon looking at surgical options to help with his pain. He has had multiple surgeries over the years. He denies referred pain into his LE. Pain is localized to B low back. Pt notes sitting and standing for extended periods of time. Pt notes pain with walking for any period of time. He doesn't have much of an issue while lifting but afterwards it affects hi pain.  and see below in therapy evaluation codes   Pain: rest 7 /10, activity 9/10 Location: B low back Frequency: Constant Described as: aching, dull, sharp and shooting Alleviated by: Rest, Ice and Pain medications Progression of Symptoms: Gradually getting worse. Time of day when pain is worse: All times of the day/constant  Sleeping: Interrupted due to current issue and Losing 3 hours of sleep per night   Occupation:   Job duties: keyboarding/computer use      Other pertinent PMH/Red Flags: Migraines/Headaches, Osteoarthritis   Pertinent Surgical History: multiple disc surgeries in L spine, most recent 1 year ago      Lumbar Spine Evaluation  Static Posture  Standing posture: No obvious findings  Sitting posture: Good Response to corrected sitting posture: NA      Hip Joint Screen Negative    Trunk Range of Motion  Movement Loss Major Moderate Minimal Nil Pain   Flexion  x   x   Extension  x   xxx   Sidebending R   x     Sidebending L   x     Side Gliding R    x     Side Gliding L    x          Hip and Knee Strength   MMT Hip Abduction Hip Extension Hip ER Knee Flexion   Left 4-/5 4-/5 4+/5 4+/5   Right 4-/5 4-/5 4+/5 4+/5         Palpation:  No tenderness to palpation    Assessment/Plan:  Patient is a 45 year old male with lumbar complaints.    Patient has the following significant findings with corresponding treatment plan.                Diagnosis 1:  B lumbar pain  Pain -  hot/cold therapy, electric stimulation, mechanical traction, manual therapy, splint/taping/bracing/orthotics, self management, education, directional preference exercise and home program  Decreased ROM/flexibility - manual therapy and therapeutic exercise  Decreased joint mobility - manual therapy and therapeutic exercise  Decreased strength - therapeutic exercise and therapeutic activities    Therapy Evaluation Codes:     Cumulative Therapy Evaluation is: Low complexity.    Previous and current functional limitations:  (See Goal Flow Sheet for this information)    Short term and Long term goals: (See Goal Flow Sheet for this information)     Communication ability:  Patient appears to be able to clearly communicate and understand verbal and written communication and follow directions correctly.  Treatment Explanation - The following has been discussed with the patient:   RX ordered/plan of care  Anticipated outcomes  Possible risks and side effects  This patient would benefit from PT intervention to resume normal activities.   Rehab potential is good.    Frequency:  1 X week, once daily  Duration:  for 6 weeks  Discharge Plan:  Achieve all LTG.  Independent in home treatment program.  Reach maximal therapeutic benefit.    Please refer to the daily flowsheet for treatment today, total treatment time and time spent performing 1:1 timed codes.

## 2021-10-03 ENCOUNTER — HEALTH MAINTENANCE LETTER (OUTPATIENT)
Age: 46
End: 2021-10-03

## 2021-11-09 ENCOUNTER — MYC MEDICAL ADVICE (OUTPATIENT)
Dept: PALLIATIVE MEDICINE | Facility: CLINIC | Age: 46
End: 2021-11-09
Payer: COMMERCIAL

## 2021-11-09 NOTE — TELEPHONE ENCOUNTER
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     Would you be willing to take a moment to fill out some disability forms for a private insurance claim.     I am having my  send you documents. Please let me know if you need anything from me.     Thanks!  Diego Meng      --------------Devon below response to pt----------------    Suzette Cortez,     Sorry to hear it sounds like you are having a tough time right now. Unfortunately, our office does not complete disability paperwork.  You will want to reach out to your primary care provider. I do see that it has been a while since you have been seen. I would also recommend that you do schedule a follow up appointment with Dr Vallecillo.  [phone]    Trena MACIAS, RN Care Coordinator  [facility]  Pain Management

## 2021-11-11 NOTE — TELEPHONE ENCOUNTER
Will leave encounter open for patient response/chart review by nursing.     Mike Cortez,     Sorry to hear that you will not continuing care at our clinic. I did clarify with Dr Vallecillo and she does not fill out disability forms. She suggests you make an appointment with your primary care provider to discuss filling out these forms. Take care and wishing you well.     Trena MACIAS, RN Care Coordinator  [facility]  Pain Management

## 2022-01-23 ENCOUNTER — HEALTH MAINTENANCE LETTER (OUTPATIENT)
Age: 47
End: 2022-01-23

## 2022-02-15 NOTE — PATIENT INSTRUCTIONS
1. Follow up in 3 months - you have been given a prescription for suboxone with 2 refills. We are increasing the dose from 8mg twice a day to 8mg three times a day.     Reba Vallecillo MD    [facility]

## 2022-07-11 ENCOUNTER — TRANSCRIBE ORDERS (OUTPATIENT)
Dept: OTHER | Age: 47
End: 2022-07-11

## 2022-07-11 DIAGNOSIS — R13.10 DYSPHAGIA: ICD-10-CM

## 2022-07-11 DIAGNOSIS — Z98.1 S/P CERVICAL SPINAL FUSION: Primary | ICD-10-CM

## 2022-07-26 ENCOUNTER — HOSPITAL ENCOUNTER (OUTPATIENT)
Dept: SPEECH THERAPY | Facility: CLINIC | Age: 47
Discharge: HOME | End: 2022-07-26
Payer: COMMERCIAL

## 2022-07-26 PROCEDURE — 92507 TX SP LANG VOICE COMM INDIV: CPT | Mod: GN | Performed by: SPEECH-LANGUAGE PATHOLOGIST

## 2022-07-26 PROCEDURE — 92524 BEHAVRAL QUALIT ANALYS VOICE: CPT | Mod: GN | Performed by: SPEECH-LANGUAGE PATHOLOGIST

## 2022-07-27 NOTE — PROGRESS NOTES
Speech-Language Pathology Department   EVALUATION  [facility] [place]    07/26/22 1300   General Information   Type Of Visit Initial   Start Of Care Date 07/26/22   Orders Evaluate And Treat   Orders Comment Dysphagia   Medical Diagnosis S/P cervical spinal fusion (Z98.1)   Onset Of Illness/injury Or Date Of Surgery 04/12/22  (Date of cervical spinal fusion)   Precautions/Limitations  no known precautions/limitations   Hearing WFL   Surgical/Medical history reviewed Yes   Pertinent History Of Current Problem Pt is a 46 y.o. male s/p cervical spinal fusion 4/12/22. Pt healing well from this procedure and was referred for an OP SLP evaluation d/t reported concerns with voice and dysphagia. At this time Pt reports his dysphagia is mostly resolved, is experiencing episodes of  squeezing  and  pressure  within his vocal tract making it difficult for him to speak and breath. He has found doing some relaxed breathing/meditative breathing and reducing phonation on exhalation has significantly improved  60% improved.  Pt reports he has always cleared his throat frequently, however, post cervical function he feels that this has significantly increased and does make his vocal function worse.   Prior Level of Functioning No previous problems.   Patient Role/employment History Employed  (Pt on [employer] currently.)   General Observations Pt alert and engaged.   Patient/family Goals Improve voice function.   Fall Risk Screen   Fall screen completed by SLP   Have you fallen 2 or more times in the past year? No   Have you fallen and had an injury in the past year? No   Is patient a fall risk? No   Abuse Screen (yes response referral indicated)   Feels Unsafe at Home or Work/School no   Feels Threatened by Someone no   Does Anyone Try to Keep You From Having Contact with Others or Doing Things Outside Your Home? no   Physical Signs of Abuse Present no   Pain Assessment   Pain Reported No    Personal Rating / Voice Use Rating   Describe the amount of typical non-work voice use Moderate   Describe the intensity of typical non-work voice use Moderate   Voice Profile during conversation, 1 min monologue and paragraph reading   Voice Quality Hoarse  (Reported during moments of vocal dysfunction.)   Voice quality severity rating continuum (1=Severe, 7=WNL) 5   Breath control WNL   Breath control severity rating continuum (1=Severe, 7=WNL) 7   Voice Use / Effort WNL   Voice use / Effort severity rating continuum (1=Severe, 7=WNL) 7   Pitch /Frequency Description WNL   Pitch / Frequency severity rating continuum (1=Severe, 7=WNL) 7   Volume severity rating continuum (1=Severe, 7=WNL) 7   Neuromuscular Control WNL   Neuromuscular Control severity rating continuum (1=Severe, 7=WNL) 7   Resonance WNL   Resonance severity rating continuum (1=Severe, 7=WNL) 7   Comments Pt did not experince or demonstrate an episode of vocal dysfunction during this assessment, however his report of symptoms is consistent with vocal chord dysfunctiono/Paradoxial Vocal Fold Motion.   General Therapy Interventions   Planned Therapy Interventions Voice   Voice Breath flow to sound flow;Throat clearing elimination plan;Relaxed breath sequence techniques;Voice quality/pitch or volume tasks   Impressions and Recommendations   Communication Diagnosis Mild to moderate Vocal Cord Dysfunction   Summary Based on the results of this assessment Pt is experiencing episodes of vocal cord dysfunction (VCD) s/p cervical spinal function. This is exacerbated by habitual vocal abuse behaviors of frequent throat clearing. This dysfunction is impacting Pt s ability to communicate with family, friends, and return to work. RECOMMEND: a course of skilled intervention to train vocal abuse behavior modification/throat clearing elimination plan, and exercises to reduce impact, frequency, and severity of VCD.   Frequency and Duration 1x/week for 2-3 weeks    Prognosis  Good with intervention   Risks and Benefits of Treatment have been explained. Yes   Patient & /or Caregiver  in agreement with plan of care Yes   Educational Assessment   Barriers to Learning No barriers   Preferred Learning Style Listening;Reading;Demonstration;Pictures / Video   Voice Goals   Voice Goals 1;2;3   Voice Goal 1   Goal Identifier LTG 1-Vocal Massage   Goal Description Pt will complete recommended laryngeal massage techniques 5-10x each to reduce impact of muscle tension on vibratory patterns of the vocal cords.   Target Date 09/25/22   Voice Goal 2   Goal Identifier LTG 2-Strategies/breathing techniques   Goal Description Pt will complete trained breathing techniques to reduce VCD provided min cues in order to reduce episodes for improved verbal communication.   Target Date 09/25/22   Voice Goal 3   Goal Identifier LTG 3-Vocal abuse strategies   Goal Description Pt will verbalize and demonstrate use of trained vocal abuse reduction techniques with 100% provided minimal cues to improve vocal quality for daily communication.   Target Date 09/25/22   Total Session Time   Voice Minutes (86752) 30   Total Evaluation Time 30     Thank you for referring Diego Graystash to outpatient therapy at [facility]. Please call Maria Luisa Camara MA, SLP-CCC at [phone] email [email or web address] with any questions or concerns.      Maria Luisa Camara M.A., SLP-CCC  Speech-Language Pathologist

## 2022-08-01 ENCOUNTER — MEDICAL CORRESPONDENCE (OUTPATIENT)
Dept: SPEECH THERAPY | Facility: CLINIC | Age: 47
End: 2022-08-01

## 2022-08-09 ENCOUNTER — HOSPITAL ENCOUNTER (OUTPATIENT)
Dept: SPEECH THERAPY | Facility: CLINIC | Age: 47
Discharge: HOME | End: 2022-08-09
Payer: COMMERCIAL

## 2022-08-09 PROCEDURE — 92507 TX SP LANG VOICE COMM INDIV: CPT | Mod: GN | Performed by: SPEECH-LANGUAGE PATHOLOGIST

## 2022-08-10 NOTE — TELEPHONE ENCOUNTER
My chart read by patient:    From  Tierra Vázquez, ROBBIE To  Diego Meng Sent and Delivered  5/29/2019  1:17 PM   Last Read in MyChart  5/29/2019  5:26 PM by Diego Meng        TRANSFER - IN REPORT:    Verbal report received from [facility] (name) on [employer]  being received from ED (unit) for routine progression of care      Report consisted of patients Situation, Background, Assessment and   Recommendations(SBAR). Information from the following report(s) SBAR, Kardex, ED Summary, Intake/Output, and Recent Results was reviewed with the receiving nurse. Opportunity for questions and clarification was provided. Assessment completed upon patients arrival to unit and care assumed.

## 2022-09-11 ENCOUNTER — HEALTH MAINTENANCE LETTER (OUTPATIENT)
Age: 47
End: 2022-09-11

## 2022-11-09 ENCOUNTER — TRANSCRIBE ORDERS (OUTPATIENT)
Dept: OTHER | Age: 47
End: 2022-11-09

## 2022-11-09 DIAGNOSIS — R13.10 DYSPHAGIA: ICD-10-CM

## 2022-11-09 DIAGNOSIS — Z98.1 S/P CERVICAL SPINAL FUSION: Primary | ICD-10-CM

## 2023-01-24 NOTE — ADDENDUM NOTE
Encounter addended by: Maria Luias Camara, SLP on: 1/24/2023 3:23 PM   Actions taken: Clinical Note Signed, Episode resolved

## 2023-01-24 NOTE — PROGRESS NOTES
[facility]    Outpatient Speech Language Pathology Discharge Note  Patient: Diego Meng  : 1975    Beginning/End Dates of Reporting Period:  22 to 22    Referring Provider: BETITO Pringle PA-C    Therapy Diagnosis: Mild to moderate Vocal Cord Dysfunction    Client Self Report: Pt is a 47 y.o. male who completed an OP SLP evaluation on 22. Please see the EMR for further information. Pt has cancelled his remaining SLP appts. And OP VFSS. discharge is appropriate at this time.      Objective Measurements:    Unable to determine if goals met as Pt NS'ed or cancelled remaining visits.         Goals:  Goal Identifier LTG 1-Vocal Massage   Goal Description Pt will complete recommended laryngeal massage techniques 5-10x each to reduce impact of muscle tension on vibratory patterns of the vocal cords.   Target Date 22   Date Met      Progress (detail required for progress note):       Goal Identifier LTG 2-Strategies/breathing techniques   Goal Description Pt will complete trained breathing techniques to reduce VCD provided min cues in order to reduce episodes for improved verbal communication.   Target Date 22   Date Met      Progress (detail required for progress note):       Goal Identifier LTG 3-Vocal abuse strategies   Goal Description Pt will verbalize and demonstrate use of trained vocal abuse reduction techniques with 100% provided minimal cues to improve vocal quality for daily communication.   Target Date 22   Date Met      Progress (detail required for progress note):       Plan:  Discharge from therapy.    Discharge: Yes    Reason for Discharge: Patient chooses to discontinue therapy.      Discharge Plan: Patient to continue home program.    Thank you for referring Diego Meng to outpatient therapy at [facility]. Please call Maria Luisa Camara MA, SLP-CCC at [phone] email [email or web address] with any questions or concerns.      Maria Luisa Camara M.A., SLP-CCC  Speech-Language Pathologist

## 2023-04-30 ENCOUNTER — HEALTH MAINTENANCE LETTER (OUTPATIENT)
Age: 48
End: 2023-04-30

## 2024-02-15 NOTE — NURSING NOTE
"Chief Complaint   Patient presents with     Recheck Medication       Initial BP (!) 88/48 (BP Location: Right arm, Cuff Size: Adult Large)  Pulse 70  Temp 98.5  F (36.9  C) (Oral)  Ht 5' 11\" (1.803 m)  Wt 204 lb (92.5 kg)  SpO2 96%  BMI 28.45 kg/m2 Estimated body mass index is 28.45 kg/(m^2) as calculated from the following:    Height as of this encounter: 5' 11\" (1.803 m).    Weight as of this encounter: 204 lb (92.5 kg).  Medication Reconciliation: complete   Mariya Ramsey MA    " Stable

## 2024-07-07 ENCOUNTER — HEALTH MAINTENANCE LETTER (OUTPATIENT)
Age: 49
End: 2024-07-07

## 2024-11-12 NOTE — TELEPHONE ENCOUNTER
Patient called to confirm that he could take Omeprazole twice a day. Patient informed that he could//brendab   Spoke to patient, reminded patient of date, time and location of appointment.      Reminded patient to reschedule appointment if been in contact with some who has been suspected or confirmed of covid-19. New or worsening symptoms of cough, fever, rash or shortness of breath.     Reminded patient to eat and drink as usual, hold any blood thinners or pain medications that they were asked to hold per nurse.    Reminded patient they will need a  for this appointment and requested that the  stays out of the clinic unless its medically necessary.    Reminded patient that both patient and  must wear a mask during their visit.      Debbie Mosley MA  [facility]

## 2025-02-24 NOTE — TELEPHONE ENCOUNTER
Patient calls for refills of clonazepam, atorvastatin, and Lexapro.  Advised due for a fasting physical-appointment scheduled:    Next 5 appointments (look out 90 days)     Jan 05, 2018  8:20 AM CST   SHORT with GUERO Ferreira [facility] ([facility])    [address] [place] [address]  [address]  [place] [address]   [phone]            Jan 12, 2018 10:00 AM CST   Return Visit with Reba Vallecillo MD   [facility] ([facility])    [address] [place] [address] [address]  [facility] [address]   [phone]                Atorvastatin:    Recent Labs   Lab Test 10/15/16   CHOL  196   HDL  40   TRIG  141   CHOLHDLRATIO  4.9     Medication is being filled for 1 time refill only due to:  Patient needs to be seen because it has been more than one year since last visit.  Appointment scheduled.    lexapro   For anxiety  Last Written Prescription Date: 6/16/17  Last Fill Quantity: 90, # refills: 1  Last Office Visit with [facility] primary care provider:  11/21/17 for ear pain   Next 5 appointments (look out 90 days)     Jan 05, 2018  8:20 AM CST   SHORT with GUERO Ferreira [facility] ([facility])    [address] [place] [address]  [address]  [facility] [address]   [phone]            Jan 12, 2018 10:00 AM CST   Return Visit with Reba Vallecillo MD   [facility] ([facility])    [address] [place] [id number]   [phone]                 Clonazepam      Last Written Prescription Date:  11/30/17  Last Fill Quantity: 120,   # refills: 0    Routing refill request to provider for review/approval because:  Drug not on the [facility], [facility] or [facility] refill protocol or controlled substance.  Routing to covering provider.  Please bring to our pharmacy.  Appointment scheduled.  Elizabeth Oleary RN  Message handled by Nurse Triage.                 Detail Level: Zone Text: The above diagnosis and findings were noted on the exam but not discussed at this time with the patient.

## 2025-06-13 ENCOUNTER — LAB REQUISITION (OUTPATIENT)
Dept: LAB | Facility: CLINIC | Age: 50
End: 2025-06-13
Payer: COMMERCIAL

## 2025-06-13 DIAGNOSIS — J32.8 OTHER CHRONIC SINUSITIS: ICD-10-CM

## 2025-06-13 DIAGNOSIS — J34.89 OTHER SPECIFIED DISORDERS OF NOSE AND NASAL SINUSES: ICD-10-CM

## 2025-06-13 DIAGNOSIS — H90.3 SENSORINEURAL HEARING LOSS, BILATERAL: ICD-10-CM

## 2025-06-13 PROCEDURE — 87070 CULTURE OTHR SPECIMN AEROBIC: CPT | Mod: ORL | Performed by: OTOLARYNGOLOGY

## 2025-06-16 LAB — BACTERIA SPEC CULT: ABNORMAL

## 2025-07-19 ENCOUNTER — HEALTH MAINTENANCE LETTER (OUTPATIENT)
Age: 50
End: 2025-07-19